# Patient Record
Sex: FEMALE | Race: WHITE | Employment: OTHER | URBAN - METROPOLITAN AREA
[De-identification: names, ages, dates, MRNs, and addresses within clinical notes are randomized per-mention and may not be internally consistent; named-entity substitution may affect disease eponyms.]

---

## 2017-01-26 ENCOUNTER — ANTICOAGULATION THERAPY VISIT (OUTPATIENT)
Dept: NURSING | Facility: CLINIC | Age: 72
End: 2017-01-26
Payer: COMMERCIAL

## 2017-01-26 DIAGNOSIS — I63.50 CEREBRAL ARTERY OCCLUSION WITH CEREBRAL INFARCTION (H): Primary | ICD-10-CM

## 2017-01-26 DIAGNOSIS — Z86.73 HISTORY OF CVA (CEREBROVASCULAR ACCIDENT): ICD-10-CM

## 2017-01-26 DIAGNOSIS — Z79.01 LONG-TERM (CURRENT) USE OF ANTICOAGULANTS: ICD-10-CM

## 2017-01-26 LAB — INR POINT OF CARE: 2 (ref 0.86–1.14)

## 2017-01-26 PROCEDURE — 99207 ZZC NO CHARGE NURSE ONLY: CPT

## 2017-01-26 PROCEDURE — 85610 PROTHROMBIN TIME: CPT | Mod: QW

## 2017-01-26 PROCEDURE — 36416 COLLJ CAPILLARY BLOOD SPEC: CPT

## 2017-01-26 NOTE — PROGRESS NOTES
ANTICOAGULATION FOLLOW-UP CLINIC VISIT    Patient Name:  Elise Mason  Date:  1/26/2017  Contact Type:  Face to Face    SUBJECTIVE:     Patient Findings     Positives No Problem Findings           OBJECTIVE    INR PROTIME   Date Value Ref Range Status   01/26/2017 2.0* 0.86 - 1.14 Final       ASSESSMENT / PLAN  INR assessment THER    Recheck INR In: 6 WEEKS    INR Location Clinic      Anticoagulation Summary as of 1/26/2017     INR goal 2.0-3.0   Selected INR 2.0 (1/26/2017)   Maintenance plan 5 mg (2.5 mg x 2) on Mon, Fri; 2.5 mg (2.5 mg x 1) all other days   Full instructions 5 mg on Mon, Fri; 2.5 mg all other days   Weekly total 22.5 mg   No change documented Fallon Velazco RN   Plan last modified Fallon Velazco RN (3/17/2016)   Next INR check 3/9/2017   Target end date Indefinite    Indications   Long-term (current) use of anticoagulants [Z79.01]  Cerebral artery occlusion with cerebral infarction (H) [I63.50]  History of CVA (cerebrovascular accident) [Z86.73]         Anticoagulation Episode Summary     [facility]    Preferred lab     Send INR reminders to [facility]    Comments 2.5mg tabs - dyllan dose // CALENDAR       Anticoagulation Care Providers     Provider Role Specialty Phone number    Cyn Crow MD  Internal Medicine [phone]            See the Encounter Report to view Anticoagulation Flowsheet and Dosing Calendar (Go to Encounters tab in chart review, and find the Anticoagulation Therapy Visit)        Fallon Velazco RN

## 2017-01-26 NOTE — MR AVS SNAPSHOT
Elise Mason   1/26/2017 9:00 AM   Anticoagulation Therapy Visit    Description:  71 year old female   Provider:   [facility]INIC   Department:   Nurse           INR as of 1/26/2017     Selected INR 2.0 (1/26/2017)      Anticoagulation Summary as of 1/26/2017     INR goal 2.0-3.0   Selected INR 2.0 (1/26/2017)   Full instructions 5 mg on Mon, Fri; 2.5 mg all other days   Next INR check 3/9/2017    Indications   Long-term (current) use of anticoagulants [Z79.01]  Cerebral artery occlusion with cerebral infarction (H) [I63.50]  History of CVA (cerebrovascular accident) [Z86.73]         Your next Anticoagulation Clinic appointment(s)     Mar 09, 2017  9:00 AM   Anticoagulation Visit with  ANTICOAGULATION CLINIC   [facility] ([facility])    [address] [place] [address]  [address]  [facility] [address] [phone]              Contact Numbers     [facility]  Please call  [phone] to cancel and/or reschedule your appointment   Please call  [phone] with any problems or questions regarding your therapy.        January 2017 Details    Sun Mon Tue Wed Thu Fri Sat     1               2               3               4               5               6               7                 8               9               10               11               12               13               14                 15               16               17               18               19               20               21                 22               23               24               25               26      2.5 mg   See details      27      5 mg         28      2.5 mg           29      2.5 mg         30      5 mg         31      2.5 mg              Date Details   01/26 This INR check               How to take your warfarin dose     To take:  2.5 mg Take 1 of the 2.5 mg tablets.    To take:  5 mg Take 2 of the 2.5 mg tablets.           February 2017 Details    Sun Mon Tue Wed Thu  Fri Sat        1      2.5 mg         2      2.5 mg         3      5 mg         4      2.5 mg           5      2.5 mg         6      5 mg         7      2.5 mg         8      2.5 mg         9      2.5 mg         10      5 mg         11      2.5 mg           12      2.5 mg         13      5 mg         14      2.5 mg         15      2.5 mg         16      2.5 mg         17      5 mg         18      2.5 mg           19      2.5 mg         20      5 mg         21      2.5 mg         22      2.5 mg         23      2.5 mg         24      5 mg         25      2.5 mg           26      2.5 mg         27      5 mg         28      2.5 mg              Date Details   No additional details            How to take your warfarin dose     To take:  2.5 mg Take 1 of the 2.5 mg tablets.    To take:  5 mg Take 2 of the 2.5 mg tablets.           March 2017 Details    Sun Mon Tue Wed Thu Fri Sat        1      2.5 mg         2      2.5 mg         3      5 mg         4      2.5 mg           5      2.5 mg         6      5 mg         7      2.5 mg         8      2.5 mg         9            10               11                 12               13               14               15               16               17               18                 19               20               21               22               23               24               25                 26               27               28               29               30               31                 Date Details   No additional details    Date of next INR:  3/9/2017         How to take your warfarin dose     To take:  2.5 mg Take 1 of the 2.5 mg tablets.    To take:  5 mg Take 2 of the 2.5 mg tablets.

## 2017-03-09 ENCOUNTER — ANTICOAGULATION THERAPY VISIT (OUTPATIENT)
Dept: NURSING | Facility: CLINIC | Age: 72
End: 2017-03-09
Payer: COMMERCIAL

## 2017-03-09 DIAGNOSIS — I63.50 CEREBRAL ARTERY OCCLUSION WITH CEREBRAL INFARCTION (H): ICD-10-CM

## 2017-03-09 DIAGNOSIS — Z79.01 LONG-TERM (CURRENT) USE OF ANTICOAGULANTS: ICD-10-CM

## 2017-03-09 DIAGNOSIS — Z86.73 HISTORY OF CVA (CEREBROVASCULAR ACCIDENT): ICD-10-CM

## 2017-03-09 LAB — INR POINT OF CARE: 1.8 (ref 0.86–1.14)

## 2017-03-09 PROCEDURE — 99207 ZZC NO CHARGE NURSE ONLY: CPT

## 2017-03-09 PROCEDURE — 36416 COLLJ CAPILLARY BLOOD SPEC: CPT

## 2017-03-09 PROCEDURE — 85610 PROTHROMBIN TIME: CPT | Mod: QW

## 2017-03-09 NOTE — PROGRESS NOTES
ANTICOAGULATION FOLLOW-UP CLINIC VISIT    Patient Name:  Elise Mason  Date:  3/9/2017  Contact Type:  Face to Face    SUBJECTIVE:     Patient Findings     Positives Change in diet/appetite    Comments Ate a large apple, kale, spinach salad last night.    She has noticed that she gets more short of breath when walking up stairs.  She was not short of breath at this visit.  Has a physical in July. Recommended she come in sooner if it gets worse.           OBJECTIVE    INR Protime   Date Value Ref Range Status   03/09/2017 1.8 (A) 0.86 - 1.14 Final       ASSESSMENT / PLAN  INR assessment SUB    Recheck INR In: 3 WEEKS    INR Location Clinic      Anticoagulation Summary as of 3/9/2017     INR goal 2.0-3.0   Today's INR 1.8!   Maintenance plan 5 mg (2.5 mg x 2) on Mon, Wed, Fri; 2.5 mg (2.5 mg x 1) all other days   Full instructions 3/9: 5 mg; Otherwise 5 mg on Mon, Wed, Fri; 2.5 mg all other days   Weekly total 25 mg   Plan last modified Fallon Velazco RN (3/9/2017)   Next INR check 3/30/2017   Target end date Indefinite    Indications   Long-term (current) use of anticoagulants [Z79.01]  Cerebral artery occlusion with cerebral infarction (H) [I63.50]  History of CVA (cerebrovascular accident) [Z86.73]         Anticoagulation Episode Summary     [facility]    Preferred lab     Send INR reminders to [facility]    Comments 2.5mg tabs - dyllan dose // CALENDAR       Anticoagulation Care Providers     Provider Role Specialty Phone number    Cyn Crow MD  Internal Medicine [phone]            See the Encounter Report to view Anticoagulation Flowsheet and Dosing Calendar (Go to Encounters tab in chart review, and find the Anticoagulation Therapy Visit)        Fallon Velazco RN

## 2017-03-09 NOTE — MR AVS SNAPSHOT
Elise Mason   3/9/2017 9:00 AM   Anticoagulation Therapy Visit    Description:  71 year old female   Provider:  [facility]   Department:   Nurse           INR as of 3/9/2017     Today's INR 1.8!      Anticoagulation Summary as of 3/9/2017     INR goal 2.0-3.0   Today's INR 1.8!   Full instructions 3/9: 5 mg; Otherwise 5 mg on Mon, Wed, Fri; 2.5 mg all other days   Next INR check 3/30/2017    Indications   Long-term (current) use of anticoagulants [Z79.01]  Cerebral artery occlusion with cerebral infarction (H) [I63.50]  History of CVA (cerebrovascular accident) [Z86.73]         Your next Anticoagulation Clinic appointment(s)     Mar 30, 2017  9:00 AM CDT   Anticoagulation Visit with  ANTICOAGULATION CLINIC   [facility] ([facility])    [address] [place] [address]  [address]  [facility] [address] [phone]              Contact Numbers     [facility]  Please call  [phone] to cancel and/or reschedule your appointment   Please call  [phone] with any problems or questions regarding your therapy.        March 2017 Details    Sun Mon Tue Wed Thu Fri Sat        1               2               3               4                 5               6               7               8               9      5 mg   See details      10      5 mg         11      2.5 mg           12      2.5 mg         13      5 mg         14      2.5 mg         15      5 mg         16      2.5 mg         17      5 mg         18      2.5 mg           19      2.5 mg         20      5 mg         21      2.5 mg         22      5 mg         23      2.5 mg         24      5 mg         25      2.5 mg           26      2.5 mg         27      5 mg         28      2.5 mg         29      5 mg         30            31                 Date Details   03/09 This INR check       Date of next INR:  3/30/2017         How to take your warfarin dose     To take:  2.5 mg Take 1 of the 2.5 mg tablets.     To take:  5 mg Take 2 of the 2.5 mg tablets.

## 2017-03-13 ENCOUNTER — TELEPHONE (OUTPATIENT)
Dept: PEDIATRICS | Facility: CLINIC | Age: 72
End: 2017-03-13

## 2017-03-13 DIAGNOSIS — E78.00 PURE HYPERCHOLESTEROLEMIA: ICD-10-CM

## 2017-03-13 DIAGNOSIS — Z94.0 KIDNEY REPLACED BY TRANSPLANT: Primary | ICD-10-CM

## 2017-03-13 NOTE — TELEPHONE ENCOUNTER
Patient called and scheduled a lab appointment for this Thursday 3/16. She said that it is for cholesterol and for her transplant labs that she has done every 3 to 4 months. Please enter recommended lab orders for this appointment.

## 2017-03-16 DIAGNOSIS — Z94.0 KIDNEY REPLACED BY TRANSPLANT: ICD-10-CM

## 2017-03-16 DIAGNOSIS — E78.00 PURE HYPERCHOLESTEROLEMIA: ICD-10-CM

## 2017-03-16 LAB
ERYTHROCYTE [DISTWIDTH] IN BLOOD BY AUTOMATED COUNT: 14.9 % (ref 10–15)
HCT VFR BLD AUTO: 44.6 % (ref 35–47)
HGB BLD-MCNC: 14.6 G/DL (ref 11.7–15.7)
MCH RBC QN AUTO: 30.9 PG (ref 26.5–33)
MCHC RBC AUTO-ENTMCNC: 32.7 G/DL (ref 31.5–36.5)
MCV RBC AUTO: 95 FL (ref 78–100)
PLATELET # BLD AUTO: 154 10E9/L (ref 150–450)
RBC # BLD AUTO: 4.72 10E12/L (ref 3.8–5.2)
WBC # BLD AUTO: 8.1 10E9/L (ref 4–11)

## 2017-03-16 PROCEDURE — 85027 COMPLETE CBC AUTOMATED: CPT | Performed by: PEDIATRICS

## 2017-03-16 PROCEDURE — 84100 ASSAY OF PHOSPHORUS: CPT | Performed by: PEDIATRICS

## 2017-03-16 PROCEDURE — 83735 ASSAY OF MAGNESIUM: CPT | Performed by: PEDIATRICS

## 2017-03-16 PROCEDURE — 36415 COLL VENOUS BLD VENIPUNCTURE: CPT | Performed by: PEDIATRICS

## 2017-03-16 PROCEDURE — 80053 COMPREHEN METABOLIC PANEL: CPT | Performed by: PEDIATRICS

## 2017-03-16 PROCEDURE — 80061 LIPID PANEL: CPT | Performed by: PEDIATRICS

## 2017-03-17 LAB
ALBUMIN SERPL-MCNC: 3.8 G/DL (ref 3.4–5)
ALP SERPL-CCNC: 69 U/L (ref 40–150)
ALT SERPL W P-5'-P-CCNC: 25 U/L (ref 0–50)
ANION GAP SERPL CALCULATED.3IONS-SCNC: 11 MMOL/L (ref 3–14)
AST SERPL W P-5'-P-CCNC: 27 U/L (ref 0–45)
BILIRUB SERPL-MCNC: 0.8 MG/DL (ref 0.2–1.3)
BUN SERPL-MCNC: 31 MG/DL (ref 7–30)
CALCIUM SERPL-MCNC: 10 MG/DL (ref 8.5–10.1)
CHLORIDE SERPL-SCNC: 105 MMOL/L (ref 94–109)
CHOLEST SERPL-MCNC: 163 MG/DL
CO2 SERPL-SCNC: 26 MMOL/L (ref 20–32)
CREAT SERPL-MCNC: 1.95 MG/DL (ref 0.52–1.04)
GFR SERPL CREATININE-BSD FRML MDRD: 25 ML/MIN/1.7M2
GLUCOSE SERPL-MCNC: 119 MG/DL (ref 70–99)
HDLC SERPL-MCNC: 75 MG/DL
LDLC SERPL CALC-MCNC: 56 MG/DL
MAGNESIUM SERPL-MCNC: 2 MG/DL (ref 1.6–2.3)
NONHDLC SERPL-MCNC: 88 MG/DL
PHOSPHATE SERPL-MCNC: 2.6 MG/DL (ref 2.5–4.5)
POTASSIUM SERPL-SCNC: 3.7 MMOL/L (ref 3.4–5.3)
PROT SERPL-MCNC: 7.5 G/DL (ref 6.8–8.8)
SODIUM SERPL-SCNC: 142 MMOL/L (ref 133–144)
TRIGL SERPL-MCNC: 162 MG/DL

## 2017-03-30 ENCOUNTER — ANTICOAGULATION THERAPY VISIT (OUTPATIENT)
Dept: NURSING | Facility: CLINIC | Age: 72
End: 2017-03-30
Payer: COMMERCIAL

## 2017-03-30 DIAGNOSIS — I63.50 CEREBRAL ARTERY OCCLUSION WITH CEREBRAL INFARCTION (H): ICD-10-CM

## 2017-03-30 DIAGNOSIS — Z79.01 LONG-TERM (CURRENT) USE OF ANTICOAGULANTS: ICD-10-CM

## 2017-03-30 DIAGNOSIS — Z86.73 HISTORY OF CVA (CEREBROVASCULAR ACCIDENT): ICD-10-CM

## 2017-03-30 LAB — INR POINT OF CARE: 2.3 (ref 0.86–1.14)

## 2017-03-30 PROCEDURE — 99207 ZZC NO CHARGE NURSE ONLY: CPT

## 2017-03-30 PROCEDURE — 36416 COLLJ CAPILLARY BLOOD SPEC: CPT

## 2017-03-30 PROCEDURE — 85610 PROTHROMBIN TIME: CPT | Mod: QW

## 2017-03-30 NOTE — MR AVS SNAPSHOT
Elise Mason   3/30/2017 9:00 AM   Anticoagulation Therapy Visit    Description:  71 year old female   Provider:  [facility]   Department:   Nurse           INR as of 3/30/2017     Today's INR 2.3      Anticoagulation Summary as of 3/30/2017     INR goal 2.0-3.0   Today's INR 2.3   Full instructions 5 mg on Mon, Wed, Fri; 2.5 mg all other days   Next INR check 5/11/2017    Indications   Long-term (current) use of anticoagulants [Z79.01]  Cerebral artery occlusion with cerebral infarction (H) [I63.50]  History of CVA (cerebrovascular accident) [Z86.73]         Your next Anticoagulation Clinic appointment(s)     May 11, 2017  9:00 AM CDT   Anticoagulation Visit with  ANTICOAGULATION CLINIC   [facility] ([facility])    [address] [place] [address]  [address]  [facility] [address] [phone]              Contact Numbers     [facility]  Please call  [phone] to cancel and/or reschedule your appointment   Please call  [phone] with any problems or questions regarding your therapy.        March 2017 Details    Sun Mon Tue Wed Thu Fri Sat        1               2               3               4                 5               6               7               8               9               10               11                 12               13               14               15               16               17               18                 19               20               21               22               23               24               25                 26               27               28               29               30      2.5 mg   See details      31      5 mg           Date Details   03/30 This INR check               How to take your warfarin dose     To take:  2.5 mg Take 1 of the 2.5 mg tablets.    To take:  5 mg Take 2 of the 2.5 mg tablets.           April 2017 Details    Sun Mon Tue Wed Thu Fri Sat           1      2.5 mg           2       2.5 mg         3      5 mg         4      2.5 mg         5      5 mg         6      2.5 mg         7      5 mg         8      2.5 mg           9      2.5 mg         10      5 mg         11      2.5 mg         12      5 mg         13      2.5 mg         14      5 mg         15      2.5 mg           16      2.5 mg         17      5 mg         18      2.5 mg         19      5 mg         20      2.5 mg         21      5 mg         22      2.5 mg           23      2.5 mg         24      5 mg         25      2.5 mg         26      5 mg         27      2.5 mg         28      5 mg         29      2.5 mg           30      2.5 mg                Date Details   No additional details            How to take your warfarin dose     To take:  2.5 mg Take 1 of the 2.5 mg tablets.    To take:  5 mg Take 2 of the 2.5 mg tablets.           May 2017 Details    Sun Mon Tue Wed Thu Fri Sat      1      5 mg         2      2.5 mg         3      5 mg         4      2.5 mg         5      5 mg         6      2.5 mg           7      2.5 mg         8      5 mg         9      2.5 mg         10      5 mg         11            12               13                 14               15               16               17               18               19               20                 21               22               23               24               25               26               27                 28               29               30               31                   Date Details   No additional details    Date of next INR:  5/11/2017         How to take your warfarin dose     To take:  2.5 mg Take 1 of the 2.5 mg tablets.    To take:  5 mg Take 2 of the 2.5 mg tablets.

## 2017-03-30 NOTE — PROGRESS NOTES
ANTICOAGULATION FOLLOW-UP CLINIC VISIT    Patient Name:  Elise Mason  Date:  3/30/2017  Contact Type:  Face to Face    SUBJECTIVE:     Patient Findings     Positives No Problem Findings           OBJECTIVE    INR Protime   Date Value Ref Range Status   03/30/2017 2.3 (A) 0.86 - 1.14 Final       ASSESSMENT / PLAN  INR assessment THER    Recheck INR In: 6 WEEKS    INR Location Clinic      Anticoagulation Summary as of 3/30/2017     INR goal 2.0-3.0   Today's INR 2.3   Maintenance plan 5 mg (2.5 mg x 2) on Mon, Wed, Fri; 2.5 mg (2.5 mg x 1) all other days   Full instructions 5 mg on Mon, Wed, Fri; 2.5 mg all other days   Weekly total 25 mg   No change documented Fallon Velazco RN   Plan last modified Fallon Velazco RN (3/9/2017)   Next INR check 5/11/2017   Target end date Indefinite    Indications   Long-term (current) use of anticoagulants [Z79.01]  Cerebral artery occlusion with cerebral infarction (H) [I63.50]  History of CVA (cerebrovascular accident) [Z86.73]         Anticoagulation Episode Summary     [facility]    Preferred lab     Send INR reminders to [facility]    Comments 2.5mg tabs - dyllan dose // CALENDAR       Anticoagulation Care Providers     Provider Role Specialty Phone number    Cyn Crow MD  Internal Medicine [phone]            See the Encounter Report to view Anticoagulation Flowsheet and Dosing Calendar (Go to Encounters tab in chart review, and find the Anticoagulation Therapy Visit)        Fallon Velazco RN

## 2017-05-08 ENCOUNTER — TELEPHONE (OUTPATIENT)
Dept: PEDIATRICS | Facility: CLINIC | Age: 72
End: 2017-05-08

## 2017-05-08 NOTE — TELEPHONE ENCOUNTER
Vince with Dr. Crow - would recommend she be seen sooner at  or office visit tomorrow. Wouldn't wait until 5/11/17.     Will follow up with the patient.

## 2017-05-08 NOTE — TELEPHONE ENCOUNTER
Reports cloudy urine, frequency, urgency, voiding in small amounts, suprapubic tightness x 1 week. Has pushed fluids and hasn't went away. Takes Bactrim DS qd for kidney transplant.   Denies - fever, chills, back pain, nausea, vomiting.     Would like lab only UA done on 5/11/17.

## 2017-05-09 ENCOUNTER — OFFICE VISIT (OUTPATIENT)
Dept: PEDIATRICS | Facility: CLINIC | Age: 72
End: 2017-05-09
Payer: COMMERCIAL

## 2017-05-09 VITALS
DIASTOLIC BLOOD PRESSURE: 64 MMHG | SYSTOLIC BLOOD PRESSURE: 112 MMHG | WEIGHT: 260.1 LBS | OXYGEN SATURATION: 93 % | TEMPERATURE: 98.5 F | BODY MASS INDEX: 43.34 KG/M2 | HEIGHT: 65 IN | HEART RATE: 100 BPM

## 2017-05-09 DIAGNOSIS — N30.00 ACUTE CYSTITIS WITHOUT HEMATURIA: ICD-10-CM

## 2017-05-09 DIAGNOSIS — R82.90 NONSPECIFIC FINDING ON EXAMINATION OF URINE: ICD-10-CM

## 2017-05-09 DIAGNOSIS — R30.0 DYSURIA: Primary | ICD-10-CM

## 2017-05-09 LAB
ALBUMIN UR-MCNC: 30 MG/DL
APPEARANCE UR: ABNORMAL
BACTERIA #/AREA URNS HPF: ABNORMAL /HPF
BILIRUB UR QL STRIP: NEGATIVE
COLOR UR AUTO: YELLOW
GLUCOSE UR STRIP-MCNC: NEGATIVE MG/DL
HGB UR QL STRIP: ABNORMAL
HYALINE CASTS #/AREA URNS LPF: ABNORMAL /LPF (ref 0–2)
KETONES UR STRIP-MCNC: NEGATIVE MG/DL
LEUKOCYTE ESTERASE UR QL STRIP: ABNORMAL
NITRATE UR QL: NEGATIVE
NON-SQ EPI CELLS #/AREA URNS LPF: ABNORMAL /LPF
PH UR STRIP: 5.5 PH (ref 5–7)
RBC #/AREA URNS AUTO: ABNORMAL /HPF (ref 0–2)
SP GR UR STRIP: 1.01 (ref 1–1.03)
URN SPEC COLLECT METH UR: ABNORMAL
UROBILINOGEN UR STRIP-ACNC: 0.2 EU/DL (ref 0.2–1)
WBC #/AREA URNS AUTO: ABNORMAL /HPF (ref 0–2)

## 2017-05-09 PROCEDURE — 87086 URINE CULTURE/COLONY COUNT: CPT | Performed by: INTERNAL MEDICINE

## 2017-05-09 PROCEDURE — 99213 OFFICE O/P EST LOW 20 MIN: CPT | Performed by: INTERNAL MEDICINE

## 2017-05-09 PROCEDURE — 87186 SC STD MICRODIL/AGAR DIL: CPT | Performed by: INTERNAL MEDICINE

## 2017-05-09 PROCEDURE — 81001 URINALYSIS AUTO W/SCOPE: CPT | Performed by: INTERNAL MEDICINE

## 2017-05-09 PROCEDURE — 87088 URINE BACTERIA CULTURE: CPT | Performed by: INTERNAL MEDICINE

## 2017-05-09 NOTE — PROGRESS NOTES
"  SUBJECTIVE:                                                    Elise Mason is a 71 year old female who presents to clinic today for the following health issues:      URINARY TRACT SYMPTOMS      Duration: 1 week ago.     Description  frequency and back pain    Intensity:  mild    Accompanying signs and symptoms:  Fever/chills: no   Flank pain YES- right side one night, not currently.   Nausea and vomiting: no   Vaginal symptoms: none  Abdominal/Pelvic Pain: no     History  History of frequent UTI's: no   History of kidney stones: no   Sexually Active: no   Possibility of pregnancy: No    Precipitating or alleviating factors: None    Therapies tried and outcome: cranberry juice and increase fluid intake   Outcome: No change.    Taking Bactrim daily for UTI prophylaxis.  Followed by urology as well.  Reviewed prior evaluation and treatment.     Problem list and histories reviewed & adjusted, as indicated.    Labs reviewed in EPIC      OBJECTIVE:                                                    /64 (Cuff Size: Adult Large)  Pulse 100  Temp 98.5  F (36.9  C) (Oral)  Ht 5' 5\" (1.651 m)  Wt 260 lb 1.6 oz (118 kg)  SpO2 93%  BMI 43.28 kg/m2  Body mass index is 43.28 kg/(m^2).  GEN: no distress  SKIN: no rashes  LUNGS: Clear to auscultation bilaterally. No rhonchi, rales, wheezes or retractions.  CV: Regular rate and rhythm.  No murmurs, rubs or gallops. Pulses 2+ radial.  ABD: Bowel sounds positive throughout. Soft, nontender, nondistended. No organomegaly. No masses. No CVA tenderness    UA:  Small blood, 30 protein, moderate LE  0-2 RBC  5-10 WBC     ASSESSMENT/PLAN:                                                        ICD-10-CM    1. Dysuria R30.0 *UA reflex to Microscopic and Culture ([place] (except [place] and [place])     Urine Microscopic   2. Nonspecific finding on examination of urine R82.90 Urine Culture Aerobic Bacterial   3. Acute cystitis without hematuria N30.00  "     With chronic antibiotic suppressive therapy, will await UCx result prior to changing atbx    Following OV, UCx:  >100k e Coli  R ampicillin, cipro, levofloxacin, sulfa  S Augmentin, nitrofurantoin, cephalosporins    Last   Lab Results   Component Value Date    CR 1.95 03/16/2017     Will not rx nitrofurantoin d/t renal insufficiency.  Rx Augmentin.     Has INR appointment 5/11/17.      Calos Adrian MD  [facility]

## 2017-05-09 NOTE — NURSING NOTE
"Chief Complaint   Patient presents with     Urinary Problem       Initial /64 (Cuff Size: Adult Large)  Pulse 100  Temp 98.5  F (36.9  C) (Oral)  Ht 5' 5\" (1.651 m)  Wt 260 lb 1.6 oz (118 kg)  SpO2 93%  BMI 43.28 kg/m2 Estimated body mass index is 43.28 kg/(m^2) as calculated from the following:    Height as of this encounter: 5' 5\" (1.651 m).    Weight as of this encounter: 260 lb 1.6 oz (118 kg).  Medication Reconciliation: complete    Coby Mcduffie   "

## 2017-05-11 ENCOUNTER — ANTICOAGULATION THERAPY VISIT (OUTPATIENT)
Dept: NURSING | Facility: CLINIC | Age: 72
End: 2017-05-11
Payer: COMMERCIAL

## 2017-05-11 DIAGNOSIS — Z86.73 HISTORY OF CVA (CEREBROVASCULAR ACCIDENT): ICD-10-CM

## 2017-05-11 DIAGNOSIS — Z79.01 LONG-TERM (CURRENT) USE OF ANTICOAGULANTS: ICD-10-CM

## 2017-05-11 DIAGNOSIS — I63.50 CEREBRAL ARTERY OCCLUSION WITH CEREBRAL INFARCTION (H): ICD-10-CM

## 2017-05-11 LAB
BACTERIA SPEC CULT: ABNORMAL
INR POINT OF CARE: 3.2 (ref 0.86–1.14)
MICRO REPORT STATUS: ABNORMAL
MICROORGANISM SPEC CULT: ABNORMAL
SPECIMEN SOURCE: ABNORMAL

## 2017-05-11 PROCEDURE — 85610 PROTHROMBIN TIME: CPT | Mod: QW

## 2017-05-11 PROCEDURE — 99207 ZZC NO CHARGE NURSE ONLY: CPT

## 2017-05-11 PROCEDURE — 36416 COLLJ CAPILLARY BLOOD SPEC: CPT

## 2017-05-11 NOTE — PROGRESS NOTES
ANTICOAGULATION FOLLOW-UP CLINIC VISIT    Patient Name:  Elise Mason  Date:  5/11/2017  Contact Type:  Face to Face  Starting Augmentin 875 mg bid x 1 week, will hold Bactrim while on augmentin.  SUBJECTIVE:        OBJECTIVE    INR Protime   Date Value Ref Range Status   03/30/2017 2.3 (A) 0.86 - 1.14 Final       ASSESSMENT / PLAN  INR assessment SUPRA    Recheck INR In: 1 WEEK    INR Location Clinic      Anticoagulation Summary as of 5/11/2017     INR goal 2.0-3.0   Today's INR    Plan last modified Fallon Velazco RN (3/9/2017)   Next INR check    Target end date Indefinite    Indications   Long-term (current) use of anticoagulants [Z79.01]  Cerebral artery occlusion with cerebral infarction (H) [I63.50]  History of CVA (cerebrovascular accident) [Z86.73]         Anticoagulation Episode Summary     [facility]    Preferred lab     Send INR reminders to [facility]    Comments 2.5mg tabs - dyllan dose // CALENDAR       Anticoagulation Care Providers     Provider Role Specialty Phone number    Cyn Crow MD  Internal Medicine [phone]            See the Encounter Report to view Anticoagulation Flowsheet and Dosing Calendar (Go to Encounters tab in chart review, and find the Anticoagulation Therapy Visit)      Blanca Holbrook RN

## 2017-05-11 NOTE — MR AVS SNAPSHOT
Elise Mason   5/11/2017 9:00 AM   Anticoagulation Therapy Visit    Description:  71 year old female   Provider:  [facility]   Department:   Nurse           INR as of 5/11/2017     Today's INR       Anticoagulation Summary as of 5/11/2017     INR goal 2.0-3.0   Today's INR    Next INR check     Indications   Long-term (current) use of anticoagulants [Z79.01]  Cerebral artery occlusion with cerebral infarction (H) [I63.50]  History of CVA (cerebrovascular accident) [Z86.73]         Your next Anticoagulation Clinic appointment(s)     May 18, 2017  8:45 AM CDT   Anticoagulation Visit with  ANTICOAGULATION CLINIC   [facility]    [address]  [address]  [facility] [address] [phone]              Contact Numbers     [facility]  Please call  [phone] to cancel and/or reschedule your appointment   Please call  [phone] with any problems or questions regarding your therapy.        March 2017 Details    Sun Mon Tue Wed Thu Fri Sat        1               2               3               4                 5               6               7               8               9               10               11                 12               13               14               15               16               17               18                 19               20               21               22               23               24               25                 26               27               28               29               30      2.5 mg   See details      31      5 mg           Date Details   03/30 This INR check               How to take your warfarin dose     To take:  2.5 mg Take 1 of the 2.5 mg tablets.    To take:  5 mg Take 2 of the 2.5 mg tablets.           April 2017 Details    Sun Mon Tue Wed Thu Fri Sat           1      2.5 mg           2      2.5 mg         3      5 mg         4      2.5 mg         5      5 mg          6      2.5 mg         7      5 mg         8      2.5 mg           9      2.5 mg         10      5 mg         11      2.5 mg         12      5 mg         13      2.5 mg         14      5 mg         15      2.5 mg           16      2.5 mg         17      5 mg         18      2.5 mg         19      5 mg         20      2.5 mg         21      5 mg         22      2.5 mg           23      2.5 mg         24      5 mg         25      2.5 mg         26      5 mg         27      2.5 mg         28      5 mg         29      2.5 mg           30      2.5 mg                Date Details   No additional details            How to take your warfarin dose     To take:  2.5 mg Take 1 of the 2.5 mg tablets.    To take:  5 mg Take 2 of the 2.5 mg tablets.           May 2017 Details    Sun Mon Tue Wed Thu Fri Sat      1      5 mg         2      2.5 mg         3      5 mg         4      2.5 mg         5      5 mg         6      2.5 mg           7      2.5 mg         8      5 mg         9      2.5 mg         10      5 mg         11            12               13                 14               15               16               17               18               19               20                 21               22               23               24               25               26               27                 28               29               30               31                   Date Details   No additional details    Date of next INR:  5/11/2017         How to take your warfarin dose     To take:  2.5 mg Take 1 of the 2.5 mg tablets.    To take:  5 mg Take 2 of the 2.5 mg tablets.

## 2017-05-18 ENCOUNTER — ANTICOAGULATION THERAPY VISIT (OUTPATIENT)
Dept: NURSING | Facility: CLINIC | Age: 72
End: 2017-05-18
Payer: COMMERCIAL

## 2017-05-18 DIAGNOSIS — I63.50 CEREBRAL ARTERY OCCLUSION WITH CEREBRAL INFARCTION (H): ICD-10-CM

## 2017-05-18 DIAGNOSIS — Z86.73 HISTORY OF CVA (CEREBROVASCULAR ACCIDENT): ICD-10-CM

## 2017-05-18 DIAGNOSIS — Z79.01 LONG-TERM (CURRENT) USE OF ANTICOAGULANTS: ICD-10-CM

## 2017-05-18 LAB — INR POINT OF CARE: 2.3 (ref 0.86–1.14)

## 2017-05-18 PROCEDURE — 36416 COLLJ CAPILLARY BLOOD SPEC: CPT

## 2017-05-18 PROCEDURE — 85610 PROTHROMBIN TIME: CPT | Mod: QW

## 2017-05-18 NOTE — MR AVS SNAPSHOT
Elise Mason   5/18/2017 8:45 AM   Anticoagulation Therapy Visit    Description:  71 year old female   Provider:  [facility]   Department:   Nurse           INR as of 5/18/2017     Today's INR 2.3      Anticoagulation Summary as of 5/18/2017     INR goal 2.0-3.0   Today's INR 2.3   Full instructions 5 mg on Mon, Wed, Fri; 2.5 mg all other days   Next INR check 6/2/2017    Indications   Long-term (current) use of anticoagulants [Z79.01]  Cerebral artery occlusion with cerebral infarction (H) [I63.50]  History of CVA (cerebrovascular accident) [Z86.73]         Instructions    Finished Augmentin yesterday, restarted Bactrim from this morning. UTI sx's has resolved.        Your next Anticoagulation Clinic appointment(s)     Jun 02, 2017  9:30 AM CDT   Anticoagulation Visit with  ANTICOAGULATION CLINIC   [facility] ([facility])    [address] [place] [address]  [address]  [facility] [address] [phone]              Contact Numbers     [facility]  Please call  [phone] to cancel and/or reschedule your appointment   Please call  [phone] with any problems or questions regarding your therapy.        May 2017 Details    Sun Mon Tue Wed Thu Fri Sat      1               2               3               4               5               6                 7               8               9               10               11               12               13                 14               15               16               17               18      2.5 mg   See details      19      5 mg         20      2.5 mg           21      2.5 mg         22      5 mg         23      2.5 mg         24      5 mg         25      2.5 mg         26      5 mg         27      2.5 mg           28      2.5 mg         29      5 mg         30      2.5 mg         31      5 mg             Date Details   05/18 This INR check               How to take your warfarin dose     To take:  2.5 mg  Take 1 of the 2.5 mg tablets.    To take:  5 mg Take 2 of the 2.5 mg tablets.           June 2017 Details    Sun Mon Tue Wed Thu Fri Sat         1      2.5 mg         2            3                 4               5               6               7               8               9               10                 11               12               13               14               15               16               17                 18               19               20               21               22               23               24                 25               26               27               28               29               30                 Date Details   No additional details    Date of next INR:  6/2/2017         How to take your warfarin dose     To take:  2.5 mg Take 1 of the 2.5 mg tablets.    To take:  5 mg Take 2 of the 2.5 mg tablets.

## 2017-05-18 NOTE — PROGRESS NOTES
ANTICOAGULATION FOLLOW-UP CLINIC VISIT    Patient Name:  Elise Mason  Date:  5/18/2017  Contact Type:  Face to Face    SUBJECTIVE:        OBJECTIVE    INR Protime   Date Value Ref Range Status   05/11/2017 3.2 (A) 0.86 - 1.14 Final       ASSESSMENT / PLAN  INR assessment THER    Recheck INR In: 2 WEEKS    INR Location Clinic      Anticoagulation Summary as of 5/18/2017     INR goal 2.0-3.0   Today's INR    Plan last modified Fallon Velazco RN (3/9/2017)   Next INR check    Target end date Indefinite    Indications   Long-term (current) use of anticoagulants [Z79.01]  Cerebral artery occlusion with cerebral infarction (H) [I63.50]  History of CVA (cerebrovascular accident) [Z86.73]         Anticoagulation Episode Summary     [facility]    Preferred lab     Send INR reminders to [facility]    Comments 2.5mg tabs - dyllan dose // CALENDAR       Anticoagulation Care Providers     Provider Role Specialty Phone number    Cyn Crow MD  Internal Medicine [phone]            See the Encounter Report to view Anticoagulation Flowsheet and Dosing Calendar (Go to Encounters tab in chart review, and find the Anticoagulation Therapy Visit)        Blanca Holbrook RN

## 2017-05-31 ENCOUNTER — TELEPHONE (OUTPATIENT)
Dept: PEDIATRICS | Facility: CLINIC | Age: 72
End: 2017-05-31

## 2017-05-31 DIAGNOSIS — R30.0 DYSURIA: Primary | ICD-10-CM

## 2017-06-02 ENCOUNTER — ANTICOAGULATION THERAPY VISIT (OUTPATIENT)
Dept: NURSING | Facility: CLINIC | Age: 72
End: 2017-06-02
Payer: COMMERCIAL

## 2017-06-02 DIAGNOSIS — R30.0 DYSURIA: ICD-10-CM

## 2017-06-02 DIAGNOSIS — N30.01 ACUTE CYSTITIS WITH HEMATURIA: ICD-10-CM

## 2017-06-02 DIAGNOSIS — I63.50 CEREBRAL ARTERY OCCLUSION WITH CEREBRAL INFARCTION (H): ICD-10-CM

## 2017-06-02 DIAGNOSIS — Z79.01 LONG-TERM (CURRENT) USE OF ANTICOAGULANTS: ICD-10-CM

## 2017-06-02 DIAGNOSIS — Z86.73 HISTORY OF CVA (CEREBROVASCULAR ACCIDENT): ICD-10-CM

## 2017-06-02 DIAGNOSIS — R82.90 NONSPECIFIC FINDING ON EXAMINATION OF URINE: Primary | ICD-10-CM

## 2017-06-02 LAB
ALBUMIN UR-MCNC: 30 MG/DL
APPEARANCE UR: CLEAR
BACTERIA #/AREA URNS HPF: ABNORMAL /HPF
BILIRUB UR QL STRIP: NEGATIVE
COLOR UR AUTO: YELLOW
GLUCOSE UR STRIP-MCNC: NEGATIVE MG/DL
HGB UR QL STRIP: ABNORMAL
INR POINT OF CARE: 3 (ref 0.86–1.14)
KETONES UR STRIP-MCNC: NEGATIVE MG/DL
LEUKOCYTE ESTERASE UR QL STRIP: ABNORMAL
NITRATE UR QL: POSITIVE
PH UR STRIP: 5.5 PH (ref 5–7)
RBC #/AREA URNS AUTO: ABNORMAL /HPF (ref 0–2)
SP GR UR STRIP: 1.01 (ref 1–1.03)
URN SPEC COLLECT METH UR: ABNORMAL
UROBILINOGEN UR STRIP-ACNC: 0.2 EU/DL (ref 0.2–1)
WBC #/AREA URNS AUTO: >100 /HPF (ref 0–2)

## 2017-06-02 PROCEDURE — 87086 URINE CULTURE/COLONY COUNT: CPT | Performed by: PEDIATRICS

## 2017-06-02 PROCEDURE — 36416 COLLJ CAPILLARY BLOOD SPEC: CPT

## 2017-06-02 PROCEDURE — 81001 URINALYSIS AUTO W/SCOPE: CPT | Performed by: PEDIATRICS

## 2017-06-02 PROCEDURE — 87186 SC STD MICRODIL/AGAR DIL: CPT | Performed by: PEDIATRICS

## 2017-06-02 PROCEDURE — 85610 PROTHROMBIN TIME: CPT | Mod: QW

## 2017-06-02 PROCEDURE — 99207 ZZC NO CHARGE NURSE ONLY: CPT

## 2017-06-02 PROCEDURE — 87088 URINE BACTERIA CULTURE: CPT | Performed by: PEDIATRICS

## 2017-06-02 RX ORDER — CEFDINIR 300 MG/1
300 CAPSULE ORAL 2 TIMES DAILY
Qty: 20 CAPSULE | Refills: 0 | Status: SHIPPED | OUTPATIENT
Start: 2017-06-02 | End: 2017-07-14

## 2017-06-02 NOTE — MR AVS SNAPSHOT
Elise Mason   6/2/2017 9:30 AM   Anticoagulation Therapy Visit    Description:  71 year old female   Provider:  [facility]   Department:  [facility]           INR as of 6/2/2017     Today's INR 3.0      Anticoagulation Summary as of 6/2/2017     INR goal 2.0-3.0   Today's INR 3.0   Full instructions 5 mg on Mon, Wed, Fri; 2.5 mg all other days   Next INR check 7/14/2017    Indications   Long-term (current) use of anticoagulants [Z79.01]  Cerebral artery occlusion with cerebral infarction (H) [I63.50]  History of CVA (cerebrovascular accident) [Z86.73]         Your next Anticoagulation Clinic appointment(s)     Jul 14, 2017  8:45 AM CDT   Anticoagulation Visit with  ANTICOAGULATION CLINIC   [facility] ([facility])    [address] [place] [address]  [address]  [facility] [address] [phone]              Contact Numbers     [facility]  Please call  [phone] to cancel and/or reschedule your appointment   Please call  [phone] with any problems or questions regarding your therapy.        June 2017 Details    Sun Mon Tue Wed Thu Fri Sat         1               2      5 mg   See details      3      2.5 mg           4      2.5 mg         5      5 mg         6      2.5 mg         7      5 mg         8      2.5 mg         9      5 mg         10      2.5 mg           11      2.5 mg         12      5 mg         13      2.5 mg         14      5 mg         15      2.5 mg         16      5 mg         17      2.5 mg           18      2.5 mg         19      5 mg         20      2.5 mg         21      5 mg         22      2.5 mg         23      5 mg         24      2.5 mg           25      2.5 mg         26      5 mg         27      2.5 mg         28      5 mg         29      2.5 mg         30      5 mg           Date Details   06/02 This INR check               How to take your warfarin dose     To take:  2.5 mg Take 1 of the 2.5 mg tablets.    To take:  5 mg Take 2 of  the 2.5 mg tablets.           July 2017 Details    Sun Mon Tue Wed Thu Fri Sat           1      2.5 mg           2      2.5 mg         3      5 mg         4      2.5 mg         5      5 mg         6      2.5 mg         7      5 mg         8      2.5 mg           9      2.5 mg         10      5 mg         11      2.5 mg         12      5 mg         13      2.5 mg         14            15                 16               17               18               19               20               21               22                 23               24               25               26               27               28               29                 30               31                     Date Details   No additional details    Date of next INR:  7/14/2017         How to take your warfarin dose     To take:  2.5 mg Take 1 of the 2.5 mg tablets.    To take:  5 mg Take 2 of the 2.5 mg tablets.

## 2017-06-02 NOTE — PROGRESS NOTES
ANTICOAGULATION FOLLOW-UP CLINIC VISIT    Patient Name:  Elise Mason  Date:  6/2/2017  Contact Type:  Face to Face    SUBJECTIVE:     Patient Findings     Positives Antibiotic use or infection    Comments She suspects she is having another bladder infection.  Dropped off U/A at lab this morning.            OBJECTIVE    INR Protime   Date Value Ref Range Status   06/02/2017 3.0 (A) 0.86 - 1.14 Final       ASSESSMENT / PLAN  INR assessment THER    Recheck INR In: 6 WEEKS    INR Location Clinic      Anticoagulation Summary as of 6/2/2017     INR goal 2.0-3.0   Today's INR 3.0   Maintenance plan 5 mg (2.5 mg x 2) on Mon, Wed, Fri; 2.5 mg (2.5 mg x 1) all other days   Full instructions 5 mg on Mon, Wed, Fri; 2.5 mg all other days   Weekly total 25 mg   No change documented Fallon Velazco RN   Plan last modified Fallon Velazco RN (3/9/2017)   Next INR check 7/14/2017   Target end date Indefinite    Indications   Long-term (current) use of anticoagulants [Z79.01]  Cerebral artery occlusion with cerebral infarction (H) [I63.50]  History of CVA (cerebrovascular accident) [Z86.73]         Anticoagulation Episode Summary     [facility]    Preferred lab     Send INR reminders to [facility]    Comments 2.5mg tabs - dyllan dose // CALENDAR       Anticoagulation Care Providers     Provider Role Specialty Phone number    Cyn Crow MD  Internal Medicine [phone]            See the Encounter Report to view Anticoagulation Flowsheet and Dosing Calendar (Go to Encounters tab in chart review, and find the Anticoagulation Therapy Visit)        Fallon Velazco RN

## 2017-06-05 LAB
BACTERIA SPEC CULT: ABNORMAL
MICRO REPORT STATUS: ABNORMAL
MICROORGANISM SPEC CULT: ABNORMAL
SPECIMEN SOURCE: ABNORMAL

## 2017-06-12 DIAGNOSIS — Z79.01 LONG TERM (CURRENT) USE OF ANTICOAGULANTS: ICD-10-CM

## 2017-06-12 RX ORDER — WARFARIN SODIUM 2.5 MG/1
TABLET ORAL
Qty: 125 TABLET | Refills: 1 | Status: SHIPPED | OUTPATIENT
Start: 2017-06-12 | End: 2017-12-11

## 2017-06-12 NOTE — TELEPHONE ENCOUNTER
Warfarin refill request from pharmacy.    Last pertinent lab:  INR 3.0 on 6/2/17    Refilled per nurse protocol standing orders.    MARTIN Rios RN

## 2017-06-20 DIAGNOSIS — Z94.0 KIDNEY REPLACED BY TRANSPLANT: ICD-10-CM

## 2017-06-20 RX ORDER — AMOXICILLIN 500 MG/1
CAPSULE ORAL
Qty: 4 CAPSULE | Refills: 3 | Status: SHIPPED | OUTPATIENT
Start: 2017-06-20 | End: 2018-01-03

## 2017-07-14 ENCOUNTER — ANTICOAGULATION THERAPY VISIT (OUTPATIENT)
Dept: NURSING | Facility: CLINIC | Age: 72
End: 2017-07-14
Payer: COMMERCIAL

## 2017-07-14 DIAGNOSIS — I63.50 CEREBRAL ARTERY OCCLUSION WITH CEREBRAL INFARCTION (H): ICD-10-CM

## 2017-07-14 DIAGNOSIS — Z86.73 HISTORY OF CVA (CEREBROVASCULAR ACCIDENT): ICD-10-CM

## 2017-07-14 DIAGNOSIS — Z79.01 LONG-TERM (CURRENT) USE OF ANTICOAGULANTS: ICD-10-CM

## 2017-07-14 LAB — INR POINT OF CARE: 2.4 (ref 0.86–1.14)

## 2017-07-14 PROCEDURE — 36416 COLLJ CAPILLARY BLOOD SPEC: CPT

## 2017-07-14 PROCEDURE — 99207 ZZC NO CHARGE NURSE ONLY: CPT

## 2017-07-14 PROCEDURE — 85610 PROTHROMBIN TIME: CPT | Mod: QW

## 2017-07-14 NOTE — MR AVS SNAPSHOT
Elise Mason   7/14/2017 8:45 AM   Anticoagulation Therapy Visit    Description:  71 year old female   Provider:  [facility]   Department:   Nurse           INR as of 7/14/2017     Today's INR 2.4      Anticoagulation Summary as of 7/14/2017     INR goal 2.0-3.0   Today's INR 2.4   Full instructions 5 mg on Mon, Wed, Fri; 2.5 mg all other days   Next INR check 8/25/2017    Indications   Long-term (current) use of anticoagulants [Z79.01]  Cerebral artery occlusion with cerebral infarction (H) [I63.50]  History of CVA (cerebrovascular accident) [Z86.73]         Your next Anticoagulation Clinic appointment(s)     Aug 25, 2017  8:45 AM CDT   Anticoagulation Visit with  ANTICOAGULATION CLINIC   [facility] ([facility])    [address] [place] [address]  [address]  [facility] [address] [phone]              Contact Numbers     [facility]  Please call  [phone] to cancel and/or reschedule your appointment   Please call  [phone] with any problems or questions regarding your therapy.        July 2017 Details    Sun Mon Tue Wed Thu Fri Sat           1                 2               3               4               5               6               7               8                 9               10               11               12               13               14      5 mg   See details      15      2.5 mg           16      2.5 mg         17      5 mg         18      2.5 mg         19      5 mg         20      2.5 mg         21      5 mg         22      2.5 mg           23      2.5 mg         24      5 mg         25      2.5 mg         26      5 mg         27      2.5 mg         28      5 mg         29      2.5 mg           30      2.5 mg         31      5 mg               Date Details   07/14 This INR check               How to take your warfarin dose     To take:  2.5 mg Take 1 of the 2.5 mg tablets.    To take:  5 mg Take 2 of the 2.5 mg tablets.            August 2017 Details    Sun Mon Tue Wed Thu Fri Sat       1      2.5 mg         2      5 mg         3      2.5 mg         4      5 mg         5      2.5 mg           6      2.5 mg         7      5 mg         8      2.5 mg         9      5 mg         10      2.5 mg         11      5 mg         12      2.5 mg           13      2.5 mg         14      5 mg         15      2.5 mg         16      5 mg         17      2.5 mg         18      5 mg         19      2.5 mg           20      2.5 mg         21      5 mg         22      2.5 mg         23      5 mg         24      2.5 mg         25            26                 27               28               29               30               31                  Date Details   No additional details    Date of next INR:  8/25/2017         How to take your warfarin dose     To take:  2.5 mg Take 1 of the 2.5 mg tablets.    To take:  5 mg Take 2 of the 2.5 mg tablets.

## 2017-07-14 NOTE — PROGRESS NOTES
ANTICOAGULATION FOLLOW-UP CLINIC VISIT    Patient Name:  Elise Mason  Date:  7/14/2017  Contact Type:  Face to Face    SUBJECTIVE:     Patient Findings     Positives No Problem Findings           OBJECTIVE    INR Protime   Date Value Ref Range Status   07/14/2017 2.4 (A) 0.86 - 1.14 Final       ASSESSMENT / PLAN  INR assessment THER    Recheck INR In: 6 WEEKS    INR Location Clinic      Anticoagulation Summary as of 7/14/2017     INR goal 2.0-3.0   Today's INR 2.4   Maintenance plan 5 mg (2.5 mg x 2) on Mon, Wed, Fri; 2.5 mg (2.5 mg x 1) all other days   Full instructions 5 mg on Mon, Wed, Fri; 2.5 mg all other days   Weekly total 25 mg   No change documented Fallon Velazco RN   Plan last modified Fallon Velazco RN (3/9/2017)   Next INR check 8/25/2017   Target end date Indefinite    Indications   Long-term (current) use of anticoagulants [Z79.01]  Cerebral artery occlusion with cerebral infarction (H) [I63.50]  History of CVA (cerebrovascular accident) [Z86.73]         Anticoagulation Episode Summary     [facility]    Preferred lab     Send INR reminders to [facility]    Comments 2.5mg tabs - dyllan dose // CALENDAR       Anticoagulation Care Providers     Provider Role Specialty Phone number    Cyn Crow MD  Internal Medicine [phone]            See the Encounter Report to view Anticoagulation Flowsheet and Dosing Calendar (Go to Encounters tab in chart review, and find the Anticoagulation Therapy Visit)        Fallon Velazco RN

## 2017-08-03 DIAGNOSIS — I10 BENIGN ESSENTIAL HYPERTENSION: ICD-10-CM

## 2017-08-03 DIAGNOSIS — Z94.0 KIDNEY REPLACED BY TRANSPLANT: ICD-10-CM

## 2017-08-03 NOTE — TELEPHONE ENCOUNTER
folic acid (FOLVITE) 1 MG tablet      Last Written Prescription Date: 8/1/2016  Last Fill Quantity: 180,  # refills: 3   Last Office Visit with [facility], [facility] or  Health prescribing provider: 5/9/2017                                         Next 5 appointments (look out 90 days)     Aug 10, 2017  8:40 AM CDT   PHYSICAL with Cyn Crow MD   [facility]    [address] [place] [address]  [address]  [facility] [address]   [phone]                    felodipine ER (PLENDIL) 2.5 MG 24 hr tablet      Last Written Prescription Date: 8/1/2016  Last Fill Quantity: 90, # refills: 3    Last Office Visit with [facility], [facility] or  Health prescribing provider:  5/9/2017   Future Office Visit:    Next 5 appointments (look out 90 days)     Aug 10, 2017  8:40 AM CDT   PHYSICAL with Cyn Crow MD   [facility]    [address] [facility]  [address]  [facility] [address]   [phone]                    BP Readings from Last 3 Encounters:   05/09/17 112/64   08/15/16 128/70   08/01/16 128/70

## 2017-08-07 RX ORDER — FELODIPINE 2.5 MG/1
TABLET, EXTENDED RELEASE ORAL
Qty: 90 TABLET | Refills: 1 | Status: SHIPPED | OUTPATIENT
Start: 2017-08-07 | End: 2017-12-01

## 2017-08-07 RX ORDER — FOLIC ACID 1 MG/1
TABLET ORAL
Qty: 180 TABLET | Refills: 1 | Status: SHIPPED | OUTPATIENT
Start: 2017-08-07 | End: 2017-12-01

## 2017-08-07 NOTE — TELEPHONE ENCOUNTER
Prescription approved per [facility] Refill Protocol.  Upcoming appointment for physical this week.  Madie Jackson, RN  Triage Nurse

## 2017-08-10 ENCOUNTER — OFFICE VISIT (OUTPATIENT)
Dept: PEDIATRICS | Facility: CLINIC | Age: 72
End: 2017-08-10
Payer: COMMERCIAL

## 2017-08-10 VITALS
BODY MASS INDEX: 42.42 KG/M2 | WEIGHT: 254.6 LBS | TEMPERATURE: 98.1 F | HEIGHT: 65 IN | HEART RATE: 73 BPM | DIASTOLIC BLOOD PRESSURE: 80 MMHG | OXYGEN SATURATION: 94 % | SYSTOLIC BLOOD PRESSURE: 126 MMHG

## 2017-08-10 DIAGNOSIS — M81.0 OSTEOPOROSIS, UNSPECIFIED OSTEOPOROSIS TYPE, UNSPECIFIED PATHOLOGICAL FRACTURE PRESENCE: ICD-10-CM

## 2017-08-10 DIAGNOSIS — N39.0 RECURRENT UTI: ICD-10-CM

## 2017-08-10 DIAGNOSIS — R35.0 URINARY FREQUENCY: ICD-10-CM

## 2017-08-10 DIAGNOSIS — Z12.31 VISIT FOR SCREENING MAMMOGRAM: ICD-10-CM

## 2017-08-10 DIAGNOSIS — Z00.00 ROUTINE HISTORY AND PHYSICAL EXAMINATION OF ADULT: Primary | ICD-10-CM

## 2017-08-10 DIAGNOSIS — Z86.73 HISTORY OF CVA (CEREBROVASCULAR ACCIDENT): ICD-10-CM

## 2017-08-10 DIAGNOSIS — I10 BENIGN ESSENTIAL HYPERTENSION: ICD-10-CM

## 2017-08-10 DIAGNOSIS — Z79.01 LONG-TERM (CURRENT) USE OF ANTICOAGULANTS: ICD-10-CM

## 2017-08-10 DIAGNOSIS — I63.50 CEREBRAL ARTERY OCCLUSION WITH CEREBRAL INFARCTION (H): ICD-10-CM

## 2017-08-10 DIAGNOSIS — R73.01 IMPAIRED FASTING GLUCOSE: ICD-10-CM

## 2017-08-10 DIAGNOSIS — M25.552 HIP PAIN, LEFT: ICD-10-CM

## 2017-08-10 DIAGNOSIS — Q61.3 POLYCYSTIC KIDNEY: ICD-10-CM

## 2017-08-10 DIAGNOSIS — D84.9 IMMUNOSUPPRESSED STATUS (H): ICD-10-CM

## 2017-08-10 DIAGNOSIS — Z94.0 KIDNEY REPLACED BY TRANSPLANT: ICD-10-CM

## 2017-08-10 DIAGNOSIS — E66.01 MORBID OBESITY, UNSPECIFIED OBESITY TYPE (H): ICD-10-CM

## 2017-08-10 DIAGNOSIS — I10 ESSENTIAL HYPERTENSION: ICD-10-CM

## 2017-08-10 DIAGNOSIS — R06.09 DYSPNEA ON EXERTION: ICD-10-CM

## 2017-08-10 DIAGNOSIS — E78.00 PURE HYPERCHOLESTEROLEMIA: ICD-10-CM

## 2017-08-10 DIAGNOSIS — Q44.6 POLYCYSTIC LIVER DISEASE: ICD-10-CM

## 2017-08-10 LAB
ALBUMIN UR-MCNC: NEGATIVE MG/DL
APPEARANCE UR: ABNORMAL
BACTERIA #/AREA URNS HPF: ABNORMAL /HPF
BILIRUB UR QL STRIP: NEGATIVE
COLOR UR AUTO: YELLOW
GLUCOSE UR STRIP-MCNC: NEGATIVE MG/DL
HGB UR QL STRIP: ABNORMAL
KETONES UR STRIP-MCNC: NEGATIVE MG/DL
LEUKOCYTE ESTERASE UR QL STRIP: ABNORMAL
NITRATE UR QL: NEGATIVE
NON-SQ EPI CELLS #/AREA URNS LPF: ABNORMAL /LPF
PH UR STRIP: 6 PH (ref 5–7)
RBC #/AREA URNS AUTO: ABNORMAL /HPF (ref 0–2)
SP GR UR STRIP: 1.01 (ref 1–1.03)
URN SPEC COLLECT METH UR: ABNORMAL
UROBILINOGEN UR STRIP-ACNC: 0.2 EU/DL (ref 0.2–1)
WBC #/AREA URNS AUTO: >100 /HPF (ref 0–2)

## 2017-08-10 PROCEDURE — 99213 OFFICE O/P EST LOW 20 MIN: CPT | Mod: 25 | Performed by: PEDIATRICS

## 2017-08-10 PROCEDURE — 81001 URINALYSIS AUTO W/SCOPE: CPT | Performed by: PEDIATRICS

## 2017-08-10 PROCEDURE — 87086 URINE CULTURE/COLONY COUNT: CPT | Performed by: PEDIATRICS

## 2017-08-10 PROCEDURE — 87186 SC STD MICRODIL/AGAR DIL: CPT | Performed by: PEDIATRICS

## 2017-08-10 PROCEDURE — 87088 URINE BACTERIA CULTURE: CPT | Performed by: PEDIATRICS

## 2017-08-10 PROCEDURE — 99397 PER PM REEVAL EST PAT 65+ YR: CPT | Performed by: PEDIATRICS

## 2017-08-10 RX ORDER — CALCITRIOL 0.5 UG/1
0.5 CAPSULE, LIQUID FILLED ORAL DAILY
Qty: 90 CAPSULE | Refills: 3 | Status: SHIPPED | OUTPATIENT
Start: 2017-08-10 | End: 2017-12-01

## 2017-08-10 RX ORDER — SIMVASTATIN 20 MG
20 TABLET ORAL AT BEDTIME
Qty: 90 TABLET | Refills: 3 | Status: SHIPPED | OUTPATIENT
Start: 2017-08-10 | End: 2017-12-01

## 2017-08-10 RX ORDER — FUROSEMIDE 80 MG
80 TABLET ORAL 2 TIMES DAILY
Qty: 180 TABLET | Refills: 3 | Status: SHIPPED | OUTPATIENT
Start: 2017-08-10 | End: 2017-12-01

## 2017-08-10 RX ORDER — DILTIAZEM HYDROCHLORIDE 240 MG/1
240 CAPSULE, EXTENDED RELEASE ORAL DAILY
Qty: 90 CAPSULE | Refills: 3 | Status: SHIPPED | OUTPATIENT
Start: 2017-08-10 | End: 2017-12-01

## 2017-08-10 RX ORDER — CEFDINIR 300 MG/1
300 CAPSULE ORAL 2 TIMES DAILY
Qty: 20 CAPSULE | Refills: 0 | Status: ON HOLD | OUTPATIENT
Start: 2017-08-10 | End: 2018-02-23

## 2017-08-10 RX ORDER — SULFAMETHOXAZOLE AND TRIMETHOPRIM 400; 80 MG/1; MG/1
1 TABLET ORAL DAILY
Qty: 90 TABLET | Refills: 3 | Status: SHIPPED | OUTPATIENT
Start: 2017-08-10 | End: 2017-12-01

## 2017-08-10 RX ORDER — CLONIDINE HYDROCHLORIDE 0.2 MG/1
0.2 TABLET ORAL 2 TIMES DAILY
Qty: 180 TABLET | Refills: 3 | Status: SHIPPED | OUTPATIENT
Start: 2017-08-10 | End: 2017-12-01

## 2017-08-10 NOTE — PROGRESS NOTES
SUBJECTIVE:   Elise Mason is a 71 year old female who presents for Preventive Visit.      Are you in the first 12 months of your Medicare coverage?  No    Physical   Annual:     Getting at least 3 servings of Calcium per day::  Yes    Bi-annual eye exam::  Yes    Dental care twice a year::  Yes    Sleep apnea or symptoms of sleep apnea::  None    Diet::  Regular (no restrictions)    Frequency of exercise::  None    Taking medications regularly::  Yes    Medication side effects::  None    Additional concerns today::  YES      COGNITIVE SCREEN  1) Repeat 3 items (Banana, Sunrise, Chair)    2) Clock draw: NORMAL  3) 3 item recall: Recalls 1 object   Results: NORMAL clock, 1-2 items recalled: COGNITIVE IMPAIRMENT LESS LIKELY    Mini-CogTM Copyright S [place]. Licensed by the author for use in [facility]; reprinted with permission (niall@[facility]). All rights reserved.          Reviewed and updated as needed this visit by clinical staffTobacco  Allergies  Med Hx  Surg Hx  Fam Hx  Soc Hx        Reviewed and updated as needed this visit by Provider        Social History   Substance Use Topics     Smoking status: Former Smoker     Quit date: 1/1/1990     Smokeless tobacco: Never Used     Alcohol use Yes      Comment: 2-3x per year       The patient does not drink >3 drinks per day nor >7 drinks per week.      Today's PHQ-2 Score: PHQ-2 ( 1999 [employer]) 8/10/2017   Q1: Little interest or pleasure in doing things 0   Q2: Feeling down, depressed or hopeless 0   PHQ-2 Score 0   Q1: Little interest or pleasure in doing things Not at all   Q2: Feeling down, depressed or hopeless Not at all   PHQ-2 Score 0       Do you feel safe in your environment - Yes    Do you have a Health Care Directive?: No: Advance care planning reviewed with patient; information given to patient to review.      Current providers sharing in care for this patient include: Patient Care Team:  Cyn Crow MD as PCP - General  (Internal Medicine)  Michel Ventura MD as MD (Nephrology)      Hearing impairment: No    Ability to successfully perform activities of daily living: Yes, no assistance needed     Fall risk:  Fallen 2 or more times in the past year?: No  Any fall with injury in the past year?: No      Home safety:  none identified      The following health maintenance items are reviewed in Epic and correct as of today:  Health Maintenance   Topic Date Due     SARAH QUESTIONNAIRE 1 YEAR  09/26/1963     PHQ-9 Q1YR  09/26/1963     ADVANCE DIRECTIVE PLANNING Q5 YRS  07/01/2016     MEDICARE ANNUAL WELLNESS VISIT  08/01/2017     OP ANNUAL INR REFERRAL  08/01/2017     FALL RISK ASSESSMENT  08/01/2017     MAMMO Q1 YR  08/09/2017     DEXA Q2 YR  08/13/2017     INFLUENZA VACCINE (SYSTEM ASSIGNED)  09/01/2017     BMP Q1 YR  03/16/2018     LIPID MONITORING Q1 YEAR  03/16/2018     COLONOSCOPY Q10 YR  07/02/2019     TETANUS Q10 YR  08/09/2022     PNEUMOCOCCAL  Completed     HEPATITIS C SCREENING  Completed     Feels like bladder infection is coming back again.  Seems like she can only last 2-3 weeks without antibiotics.  Symptoms improve with antibiotics.  Has been growing E coli.  Has has symptoms for about another 2 weeks - has had urinary urgency and dysuria.  Urine has been cloudy, no hematuria.      Transplanted kidney - stable immunosuppression.  Follows with Dr Isidro with labs every 3-4 months.    HTN - well controlled on current medications.  No cardiac pain.    Feels like she is more out of breath if tries to walk fast or goes up and down steps.  Gradually worsening over time.   Then gets upstairs from the basement, breathes harder.  Sometimes notices a wheeze.   Coughing the mornings, sometimes coughs up something, sometimes dry.  Cold air will make her cough.   Smoked for 30 years - about 1 pack per day.  Denies associated chest pain or pressure.     Hx of CVA - on warfarin, therapeutic.  No new neurologic  "symptoms.    Dermatology - following every 6 months for history of skin cancer.  No new acute skin concerns.    Polycystic liver and kidney disease - runs strongly in her family.  No pain in her abdomen - not interested in pursuing at this time unless she starts to develop pain from cysts.    Fosamax holiday for 1 year now - due for DEXA scan.  Since being off of fosamax, feels that left hip has been aching.  This is the replaced hip.  Happens every once in awhile - not even weekly pain at this point.    HL - tolerating simvastatin without difficulty.    LE edema - stable - wears compression stockings and using lasix.      Mammogram Screening: Patient over age 50, mutual decision to screen reflected in health maintenance.     ROS: 10 point ROS neg other than the symptoms noted above in the HPI.      OBJECTIVE:   /80 (BP Location: Right arm, Patient Position: Right side, Cuff Size: Adult Regular)  Pulse 73  Temp 98.1  F (36.7  C) (Tympanic)  Ht 5' 5\" (1.651 m)  Wt 254 lb 9.6 oz (115.5 kg)  SpO2 94%  BMI 42.37 kg/m2 Estimated body mass index is 42.37 kg/(m^2) as calculated from the following:    Height as of this encounter: 5' 5\" (1.651 m).    Weight as of this encounter: 254 lb 9.6 oz (115.5 kg).  EXAM:   GENERAL: alert, no distress, obese, very pleasant  EYES: Eyes grossly normal to inspection, PERRL and conjunctivae and sclerae normal  HENT: ear canals and TM's normal, nose and mouth without ulcers or lesions  NECK: no adenopathy, no asymmetry, masses, or scars and thyroid normal to palpation  RESP: prolonged expiratory phase, moving air easily, no rales or wheeze, no increased work of breathing  CV: regular rate and rhythm, normal S1 S2, no S3 or S4, no murmur, click or rub, and peripheral pulses strong  ABDOMEN: obese, +BS, nontender  MS: no gross musculoskeletal defects noted, difficulty navigating exam table - needs assistance of one, chronic 1+ edema to lower extremities, stable over time  SKIN: no " suspicious lesions or rashes  NEURO: Normal strength and tone, mentation intact and speech normal  PSYCH: mentation appears normal, affect normal/bright    ASSESSMENT / PLAN:       ICD-10-CM    1. Routine history and physical examination of adult Z00.00 Comprehensive metabolic panel     Lipid panel reflex to direct LDL     CBC with platelets     Phosphorus                       2. Morbid obesity, unspecified obesity type (H) E66.01 Weight stable from last year.    3. Immunosuppressed status (H) D89.9 Cyclosporine, followed by Dr Isidro - no current concerns with medication regimen        4. Cerebral artery occlusion with cerebral infarction (H) I63.50 INR CLINIC REFERRAL  No new events, continue coumadin, bp control, statin   5. Osteoporosis, unspecified osteoporosis type, unspecified pathological fracture presence M81.0 DEXA HIP/PELVIS/SPINE - Future  Off fosamax x 1 year - due for repeat DEXA   6. Essential hypertension I10 Well controlled, continue current medications     7. Polycystic kidney Q61.3 Also with polycystic liver - patient not interested in further work up at this time - will alert me if having pain or enlargement for further imaging, work up   8. Long-term (current) use of anticoagulants Z79.01    9. Kidney replaced by transplant Z94.0 calcitRIOL (ROCALTROL) 0.5 MCG capsule     sulfamethoxazole-trimethoprim (BACTRIM) 400-80 MG per tablet     diltiazem (TIAZAC) 240 MG 24 hr ER beaded capsule     cloNIDine (CATAPRES) 0.2 MG tablet     furosemide (LASIX) 80 MG tablet     Comprehensive metabolic panel     CBC with platelets     Phosphorus     Cyclosporine     UROLOGY ADULT REFERRAL                       10. Impaired fasting glucose R73.01 Recheck today   11. History of CVA (cerebrovascular accident) Z86.73 On coumadin   12. Polycystic liver disease Q44.6 See above   13. Dyspnea on exertion R06.09 Concern for development of COPD - plan on formal pulmonary function tests, trial of albuterol.  Patient  "to alert me if not improving.  If results of this testing not conclusive, would consider cardiac stress testing.  Deconditioning likely also contributing to dyspnea.   14. Pure hypercholesterolemia E78.00 simvastatin (ZOCOR) 20 MG tablet   15. Benign essential hypertension I10 diltiazem (TIAZAC) 240 MG 24 hr ER beaded capsule     cloNIDine (CATAPRES) 0.2 MG tablet  Well controlled, continue current medications     16. Urinary frequency R35.0 *UA reflex to Microscopic and Culture ([facility] (except [place] and [place])     Urine Culture Aerobic Bacterial   17. Recurrent UTI N39.0 cefdinir (OMNICEF) 300 MG capsule     UROLOGY ADULT REFERRAL  UTI on UA today - concern for recurrent UTIs in setting of immunosuppression and transplanted kidney.  Treat today based on past culture results, referred to urology for further discussion of recurrent UTI.   18. Hip pain, left M25.552 Hip replaced 20 years ago - to alert me if not improving and will consider imaging, orthopedics referral   19. Visit for screening mammogram Z12.31 *MA Screening Digital Bilateral       End of Life Planning:  Patient currently has an advanced directive: No.  I have verified the patient's ablity to prepare an advanced directive/make health care decisions.  COUNSELING:  Special attention given to:       Healthy diet/nutrition       Vision screening       Osteoporosis Prevention/Bone Health       Colon cancer screening        Estimated body mass index is 42.37 kg/(m^2) as calculated from the following:    Height as of this encounter: 5' 5\" (1.651 m).    Weight as of this encounter: 254 lb 9.6 oz (115.5 kg).  Weight management plan: Discussed healthy diet and exercise guidelines and patient will follow up in 12 months in clinic to re-evaluate.   reports that she quit smoking about 27 years ago. She has never used smokeless tobacco.        Appropriate preventive services were discussed with this patient, including applicable " screening as appropriate for cardiovascular disease, diabetes, osteopenia/osteoporosis, and glaucoma.  As appropriate for age/gender, discussed screening for colorectal cancer, prostate cancer, breast cancer, and cervical cancer. Checklist reviewing preventive services available has been given to the patient.    Reviewed patients plan of care and provided an AVS. The Complex Care Plan (for patients with higher acuity and needing more deliberate coordination of services) for Elise meets the Care Plan requirement. This Care Plan has been established and reviewed with the Patient.    Counseling Resources:  ATP IV Guidelines  Pooled Cohorts Equation Calculator  Breast Cancer Risk Calculator  FRAX Risk Assessment  ICSI Preventive Guidelines  Dietary Guidelines for Americans, 2010  USDA's MyPlate  ASA Prophylaxis  Lung CA Screening    Cyn Crow MD  [facility]

## 2017-08-10 NOTE — PATIENT INSTRUCTIONS
Call [phone] to schedule lung function tests at [facility].  Try using an albuterol inhaler to see if this helps with shortness of breath/wheezing    Labs today - including urine test - will restart antibiotics if the urine shows an infection and send you to see urology.    Medications refilled.    Time for repeat mammogram - we can do this here now if needed.    Repeat DEXA scan - order placed for this - we can have this done on the first floor here now too - [phone].                Preventive Health Recommendations    Female Ages 65 +    Yearly exam:     See your health care provider every year in order to  o Review health changes.   o Discuss preventive care.    o Review your medicines if your doctor has prescribed any.      You no longer need a yearly Pap test unless you've had an abnormal Pap test in the past 10 years. If you have vaginal symptoms, such as bleeding or discharge, be sure to talk with your provider about a Pap test.      Every 1 to 2 years, have a mammogram.  If you are over 69, talk with your health care provider about whether or not you want to continue having screening mammograms.      Every 10 years, have a colonoscopy. Or, have a yearly FIT test (stool test). These exams will check for colon cancer.       Have a cholesterol test every 5 years, or more often if your doctor advises it.       Have a diabetes test (fasting glucose) every three years. If you are at risk for diabetes, you should have this test more often.       At age 65, have a bone density scan (DEXA) to check for osteoporosis (brittle bone disease).    Shots:    Get a flu shot each year.    Get a tetanus shot every 10 years.    Talk to your doctor about your pneumonia vaccines. There are now two you should receive - Pneumovax (PPSV 23) and Prevnar (PCV 13).    Talk to your doctor about the shingles vaccine.    Talk to your doctor about the hepatitis B vaccine.    Nutrition:     Eat at least 5 servings of fruits  and vegetables each day.      Eat whole-grain bread, whole-wheat pasta and brown rice instead of white grains and rice.      Talk to your provider about Calcium and Vitamin D.     Lifestyle    Exercise at least 150 minutes a week (30 minutes a day, 5 days a week). This will help you control your weight and prevent disease.      Limit alcohol to one drink per day.      No smoking.       Wear sunscreen to prevent skin cancer.       See your dentist twice a year for an exam and cleaning.      See your eye doctor every 1 to 2 years to screen for conditions such as glaucoma, macular degeneration and cataracts.

## 2017-08-10 NOTE — MR AVS SNAPSHOT
After Visit Summary   8/10/2017    Elise Mason    MRN: [id number]           Patient Information     Date Of Birth          1945        Visit Information        Provider Department      8/10/2017 8:40 AM Cyn Crow MD [facility]        Today's Diagnoses     Asymptomatic postmenopausal status    -  1    Osteoporosis, unspecified osteoporosis type, unspecified pathological fracture presence        Essential hypertension        Polycystic kidney        Long-term (current) use of anticoagulants        Kidney replaced by transplant        Impaired fasting glucose        History of CVA (cerebrovascular accident)        Cerebral artery occlusion with cerebral infarction (H)        Polycystic liver disease        Immunosuppressed status (H)        Routine history and physical examination of adult        Visit for screening mammogram        Dyspnea on exertion        Pure hypercholesterolemia        Benign essential hypertension        Urinary frequency          Care Instructions    Call [phone] to schedule lung function tests at [facility].  Try using an albuterol inhaler to see if this helps with shortness of breath/wheezing    Labs today - including urine test - will restart antibiotics if the urine shows an infection and send you to see urology.    Medications refilled.    Time for repeat mammogram - we can do this here now if needed.    Repeat DEXA scan - order placed for this - we can have this done on the first floor here now too - [phone].                Preventive Health Recommendations    Female Ages 65 +    Yearly exam:     See your health care provider every year in order to  o Review health changes.   o Discuss preventive care.    o Review your medicines if your doctor has prescribed any.      You no longer need a yearly Pap test unless you've had an abnormal Pap test in the past 10 years. If you have vaginal symptoms, such as bleeding or discharge,  be sure to talk with your provider about a Pap test.      Every 1 to 2 years, have a mammogram.  If you are over 69, talk with your health care provider about whether or not you want to continue having screening mammograms.      Every 10 years, have a colonoscopy. Or, have a yearly FIT test (stool test). These exams will check for colon cancer.       Have a cholesterol test every 5 years, or more often if your doctor advises it.       Have a diabetes test (fasting glucose) every three years. If you are at risk for diabetes, you should have this test more often.       At age 65, have a bone density scan (DEXA) to check for osteoporosis (brittle bone disease).    Shots:    Get a flu shot each year.    Get a tetanus shot every 10 years.    Talk to your doctor about your pneumonia vaccines. There are now two you should receive - Pneumovax (PPSV 23) and Prevnar (PCV 13).    Talk to your doctor about the shingles vaccine.    Talk to your doctor about the hepatitis B vaccine.    Nutrition:     Eat at least 5 servings of fruits and vegetables each day.      Eat whole-grain bread, whole-wheat pasta and brown rice instead of white grains and rice.      Talk to your provider about Calcium and Vitamin D.     Lifestyle    Exercise at least 150 minutes a week (30 minutes a day, 5 days a week). This will help you control your weight and prevent disease.      Limit alcohol to one drink per day.      No smoking.       Wear sunscreen to prevent skin cancer.       See your dentist twice a year for an exam and cleaning.      See your eye doctor every 1 to 2 years to screen for conditions such as glaucoma, macular degeneration and cataracts.          Follow-ups after your visit        Additional Services     INR CLINIC REFERRAL       Your provider has referred you to INR Services.    Please be aware that coverage of these services is subject to the terms and limitations of your health insurance plan.  Call member services at your health  plan with any benefit or coverage questions.    Indication for Anticoagulation: CVA/TIA  If nonstandard INR is desired, indicate goal range and explanation: 2-3  Expected Duration of Therapy: Lifetime                  Your next 10 appointments already scheduled     Aug 25, 2017  8:45 AM CDT   Anticoagulation Visit with  ANTICOAGULATION CLINIC   [place] [facility] ([facility] [place])    [address]  [address]  [place] [address]   [phone]              Future tests that were ordered for you today     Open Future Orders        Priority Expected Expires Ordered    DEXA HIP/PELVIS/SPINE - Future Routine  8/10/2018 8/10/2017    *MA Screening Digital Bilateral Routine  8/10/2018 8/10/2017    General PFT Lab (Please always keep checked) Routine  8/10/2018 8/10/2017    Pulmonary Function Test Routine  8/10/2018 8/10/2017            Who to contact     If you have questions or need follow up information about today's clinic visit or your schedule please contact [facility] [place] directly at [phone].  Normal or non-critical lab and imaging results will be communicated to you by Zendeskhart, letter or phone within 4 business days after the clinic has received the results. If you do not hear from us within 7 days, please contact the clinic through [facility] or phone. If you have a critical or abnormal lab result, we will notify you by phone as soon as possible.  Submit refill requests through Pop.it or call your pharmacy and they will forward the refill request to us. Please allow 3 business days for your refill to be completed.          Additional Information About Your Visit        Pop.it Information     Pop.it gives you secure access to your electronic health record. If you see a primary care provider, you can also send messages to your care team and make appointments. If you have questions, please call your primary care clinic.  If you do not have a primary care provider, please  "call [phone] and they will assist you.        Care EveryWhere ID     This is your Care EveryWhere ID. This could be used by other organizations to access your [facility] medical records  [id number]        Your Vitals Were     Pulse Temperature Height Pulse Oximetry BMI (Body Mass Index)       73 98.1  F (36.7  C) (Tympanic) 5' 5\" (1.651 m) 94% 42.37 kg/m2        Blood Pressure from Last 3 Encounters:   08/10/17 126/80   05/09/17 112/64   08/15/16 128/70    Weight from Last 3 Encounters:   08/10/17 254 lb 9.6 oz (115.5 kg)   05/09/17 260 lb 1.6 oz (118 kg)   08/15/16 257 lb (116.6 kg)              We Performed the Following     *UA reflex to Microscopic and Culture ([place] and [facility] (except [place] and [place])     CBC with platelets     Comprehensive metabolic panel     Cyclosporine     INR CLINIC REFERRAL     Lipid panel reflex to direct LDL     Phosphorus          Where to get your medicines      These medications were sent to [place] [address] [place] [facility] [place] - [address]  [address] [place] [address]     Phone:  [phone]     calcitRIOL 0.5 MCG capsule    cloNIDine 0.2 MG tablet    diltiazem 240 MG 24 hr ER beaded capsule    furosemide 80 MG tablet    simvastatin 20 MG tablet    sulfamethoxazole-trimethoprim 400-80 MG per tablet          Primary Care Provider Office Phone # Fax #    Cyn Crow [phone]       [facility] [place] [address]        Equal Access to Services     [facility] AH: Hadii aad ku hadasho Soomaali, waaxda luqadaha, qaybta kaalmada adeegyada, bibi yee. So [facility] [phone].    ATENCIÓN: Si habla español, tiene a mehta disposición servicios gratuitos de asistencia lingüística. Llame al [phone].    We comply with applicable federal civil rights laws and [place] laws. We do not discriminate on the basis of race, color, national origin, age, disability sex, sexual orientation or " gender identity.            Thank you!     Thank you for choosing [facility] [place]  for your care. Our goal is always to provide you with excellent care. Hearing back from our patients is one way we can continue to improve our services. Please take a few minutes to complete the written survey that you may receive in the mail after your visit with us. Thank you!             Your Updated Medication List - Protect others around you: Learn how to safely use, store and throw away your medicines at www.disposemymeds.org.          This list is accurate as of: 8/10/17  9:14 AM.  Always use your most recent med list.                   Brand Name Dispense Instructions for use Diagnosis    * ACE NOT PRESCRIBED (INTENTIONAL)      by Other route continuous prn.    Polycystic kidney, unspecified type, Kidney replaced by transplant       amoxicillin 500 MG capsule    AMOXIL    4 capsule    TAKE 4 CAPSULES BY MOUTH 1 HOURS PRIOR TO PROCEDURE.    Kidney replaced by transplant       * ARB NOT PRESCRIBED (INTENTIONAL)      by Other route continuous prn.    Polycystic kidney, unspecified type, Kidney replaced by transplant       ASPIRIN NOT PRESCRIBED    INTENTIONAL    0 each    1 each continuous prn for other Antiplatelet medication not prescribed intentionally due to Current anticoagulant therapy (warfarin/enoxaparin)    History of CVA (cerebrovascular accident)       azaTHIOprine 50 MG tablet    IMURAN    90    Take three tablets by mouth daily        betamethasone dipropionate 0.05 % cream    DIPROSONE    45 g    Apply sparingly to affected area twice daily as needed.  Do not apply to face.    Dermatitis       calcitRIOL 0.5 MCG capsule    ROCALTROL    90 capsule    Take 1 capsule (0.5 mcg) by mouth daily    Kidney replaced by transplant       cloNIDine 0.2 MG tablet    CATAPRES    180 tablet    Take 1 tablet (0.2 mg) by mouth 2 times daily    Kidney replaced by transplant, Benign essential hypertension       cyanocolbalamin  500 MCG tablet    vitamin  B-12     OTC    1 tab daily per transplant clinic        cycloSPORINE modified capsule     540 capsule    Take 3 capsules by mouth 2 times daily.    Kidney replaced by transplant       diltiazem 240 MG 24 hr ER beaded capsule    TIAZAC    90 capsule    Take 1 capsule (240 mg) by mouth daily    Kidney replaced by transplant, Benign essential hypertension       felodipine ER 2.5 MG 24 hr tablet    PLENDIL    90 tablet    TAKE 1 TABLET (2.5 MG) BY MOUTH DAILY    Kidney replaced by transplant, Benign essential hypertension       ferrous sulfate 325 (65 FE) MG tablet    IRON    90 tablet    Take 1 tablet by mouth daily.    Kidney replaced by transplant       folic acid 1 MG tablet    FOLVITE    180 tablet    TAKE 2 TABLETS (2,000 MCG) BY MOUTH DAILY    Kidney replaced by transplant       furosemide 80 MG tablet    LASIX    180 tablet    Take 1 tablet (80 mg) by mouth 2 times daily    Kidney replaced by transplant       GLUCOSAMINE SULFATE PO      Take 750 mLs by mouth daily        OMEGA 3 550 MG Caps   Generic drug:  LINOLENIC ACID      Take 2,000 mg by mouth 2 times daily.        * order for DME     2 each    Please dispense 2 pair of knee high compression 20-30 stockings    Kidney replaced by transplant, Polycystic kidney, unspecified type, Edema       pyridoxine 100 MG tablet    VITAMIN B-6     OTC   2 tabs daily per transplant clinic        simvastatin 20 MG tablet    ZOCOR    90 tablet    Take 1 tablet (20 mg) by mouth At Bedtime    Pure hypercholesterolemia       sulfamethoxazole-trimethoprim 400-80 MG per tablet    BACTRIM    90 tablet    Take 1 tablet by mouth daily    Kidney replaced by transplant       TUMS PO      Take 2 tablets by mouth 3 times daily.        warfarin 2.5 MG tablet    COUMADIN    125 tablet    Take 5mg MWF, 2.5mg TTSS or as directed by INR clinic    Long term (current) use of anticoagulants       * Notice:  This list has 3 medication(s) that are the same as other  medications prescribed for you. Read the directions carefully, and ask your doctor or other care provider to review them with you.

## 2017-08-10 NOTE — NURSING NOTE
"Chief Complaint   Patient presents with     Physical       Initial /80 (BP Location: Right arm, Patient Position: Right side, Cuff Size: Adult Regular)  Pulse 73  Temp 98.1  F (36.7  C) (Tympanic)  Ht 5' 5\" (1.651 m)  Wt 254 lb 9.6 oz (115.5 kg)  SpO2 94%  BMI 42.37 kg/m2 Estimated body mass index is 42.37 kg/(m^2) as calculated from the following:    Height as of this encounter: 5' 5\" (1.651 m).    Weight as of this encounter: 254 lb 9.6 oz (115.5 kg).  Medication Reconciliation: complete  "

## 2017-08-11 DIAGNOSIS — Z94.0 KIDNEY REPLACED BY TRANSPLANT: ICD-10-CM

## 2017-08-11 DIAGNOSIS — E78.00 PURE HYPERCHOLESTEROLEMIA: ICD-10-CM

## 2017-08-11 RX ORDER — ALBUTEROL SULFATE 90 UG/1
2 AEROSOL, METERED RESPIRATORY (INHALATION) EVERY 4 HOURS PRN
Qty: 1 INHALER | Refills: 1 | Status: SHIPPED | OUTPATIENT
Start: 2017-08-11 | End: 2017-12-01

## 2017-08-11 NOTE — TELEPHONE ENCOUNTER
furosemide (LASIX) 80 MG tablet      Last Written Prescription Date: 8/10/17  Last Fill Quantity: 180, # refills: 3  Last Office Visit with G, P or [facility] prescribing provider: 8/10/17       Potassium   Date Value Ref Range Status   03/16/2017 3.7 3.4 - 5.3 mmol/L Final     Creatinine   Date Value Ref Range Status   03/16/2017 1.95 (H) 0.52 - 1.04 mg/dL Final     BP Readings from Last 3 Encounters:   08/10/17 126/80   05/09/17 112/64   08/15/16 128/70

## 2017-08-11 NOTE — TELEPHONE ENCOUNTER
sulfamethoxazole-trimethoprim (BACTRIM) 400-80 MG per tablet      Last Written Prescription Date:  8/10/17  Last Fill Quantity: 90,   # refills: 3  Last Office Visit with [facility] or [facility] prescribing provider: 8/10/17  Future Office visit:       Routing refill request to provider for review/approval because:  Drug not on the [facility], [facility] or [facility] refill protocol or controlled substance

## 2017-08-11 NOTE — TELEPHONE ENCOUNTER
simvastatin (ZOCOR) 20 MG tablet     Last Written Prescription Date: 8/10/17  Last Fill Quantity: 90, # refills: 3  Last Office Visit with G, P or [facility] prescribing provider: 8/10/17       Lab Results   Component Value Date    CHOL 163 03/16/2017     Lab Results   Component Value Date    HDL 75 03/16/2017     Lab Results   Component Value Date    LDL 56 03/16/2017     Lab Results   Component Value Date    TRIG 162 03/16/2017     Lab Results   Component Value Date    CHOLHDLRATIO 2.0 02/06/2015

## 2017-08-14 RX ORDER — SIMVASTATIN 20 MG
TABLET ORAL
Qty: 90 TABLET | Refills: 3 | OUTPATIENT
Start: 2017-08-14

## 2017-08-14 RX ORDER — FUROSEMIDE 80 MG
TABLET ORAL
Qty: 180 TABLET | Refills: 3 | OUTPATIENT
Start: 2017-08-14

## 2017-08-14 RX ORDER — SULFAMETHOXAZOLE AND TRIMETHOPRIM 400; 80 MG/1; MG/1
TABLET ORAL
Qty: 90 TABLET | Refills: 3 | OUTPATIENT
Start: 2017-08-14

## 2017-08-15 PROBLEM — E66.01 MORBID OBESITY, UNSPECIFIED OBESITY TYPE (H): Status: ACTIVE | Noted: 2017-08-15

## 2017-08-15 PROBLEM — E66.01 MORBID OBESITY, UNSPECIFIED OBESITY TYPE (H): Status: ACTIVE | Noted: 2017-08-10

## 2017-08-22 ENCOUNTER — HOSPITAL ENCOUNTER (OUTPATIENT)
Dept: RESPIRATORY THERAPY | Facility: CLINIC | Age: 72
Discharge: HOME | End: 2017-08-22
Attending: PEDIATRICS | Admitting: PEDIATRICS
Payer: COMMERCIAL

## 2017-08-22 VITALS — OXYGEN SATURATION: 93 %

## 2017-08-22 DIAGNOSIS — R06.09 DYSPNEA ON EXERTION: ICD-10-CM

## 2017-08-22 LAB
DLCOUNC-%PRED-PRE: 108 %
DLCOUNC-PRE: 21.94 ML/MIN/MMHG
DLCOUNC-PRED: 20.3 ML/MIN/MMHG
ERV-%PRED-PRE: 186 %
ERV-PRE: 0.11 L
ERV-PRED: 0.06 L
EXPTIME-PRE: 4.95 SEC
FEF2575-%PRED-POST: 114 %
FEF2575-%PRED-PRE: 70 %
FEF2575-POST: 2.08 L/SEC
FEF2575-PRE: 1.27 L/SEC
FEF2575-PRED: 1.81 L/SEC
FEFMAX-%PRED-PRE: 86 %
FEFMAX-PRE: 4.78 L/SEC
FEFMAX-PRED: 5.5 L/SEC
FEV1-%PRED-PRE: 75 %
FEV1-PRE: 1.61 L
FEV1FEV6-PRE: 77 %
FEV1FEV6-PRED: 79 %
FEV1FVC-PRE: 77 %
FEV1FVC-PRED: 78 %
FEV1SVC-PRE: 64 %
FEV1SVC-PRED: 71 %
FIFMAX-PRE: 2.55 L/SEC
FRCPLETH-%PRED-PRE: 100 %
FRCPLETH-PRE: 2.73 L
FRCPLETH-PRED: 2.71 L
FVC-%PRED-PRE: 74 %
FVC-PRE: 2.09 L
FVC-PRED: 2.79 L
IC-%PRED-PRE: 80 %
IC-PRE: 2.39 L
IC-PRED: 2.96 L
RVPLETH-%PRED-PRE: 125 %
RVPLETH-PRE: 2.61 L
RVPLETH-PRED: 2.09 L
TLCPLETH-%PRED-PRE: 103 %
TLCPLETH-PRE: 5.12 L
TLCPLETH-PRED: 4.94 L
VA-%PRED-PRE: 83 %
VA-PRE: 4.21 L
VC-%PRED-PRE: 83 %
VC-PRE: 2.51 L
VC-PRED: 3.02 L

## 2017-08-22 PROCEDURE — 25000125 ZZHC RX 250

## 2017-08-22 PROCEDURE — 94726 PLETHYSMOGRAPHY LUNG VOLUMES: CPT

## 2017-08-22 PROCEDURE — 94060 EVALUATION OF WHEEZING: CPT

## 2017-08-22 PROCEDURE — 94729 DIFFUSING CAPACITY: CPT

## 2017-08-22 RX ORDER — ALBUTEROL SULFATE 0.83 MG/ML
SOLUTION RESPIRATORY (INHALATION)
Status: COMPLETED
Start: 2017-08-22 | End: 2017-08-22

## 2017-08-22 RX ORDER — ALBUTEROL SULFATE 0.83 MG/ML
2.5 SOLUTION RESPIRATORY (INHALATION)
Status: COMPLETED | OUTPATIENT
Start: 2017-08-22 | End: 2017-08-22

## 2017-08-22 RX ADMIN — ALBUTEROL SULFATE 2.5 MG: 2.5 SOLUTION RESPIRATORY (INHALATION) at 12:00

## 2017-08-22 RX ADMIN — ALBUTEROL SULFATE 2.5 MG: 0.83 SOLUTION RESPIRATORY (INHALATION) at 12:00

## 2017-08-22 NOTE — PROGRESS NOTES
PFT Note:  Pt completed pulmonary function testing with DLCO.  Pre/post flow volume loops with 2.5mg Albuterol nebulizer.  Good Pt effort and cooperation.     Spirometry  Meets all ATS-ERS recommendations.     Plethysmography  All plethysmographic measurements meet ATS-ERS recommendations.    DLCO  Meets all ATS-ERS recommendations.  DLCO is an average of 2 maneuvers.  DLCO is not corrected for Hgb.    August 22, 2017.12:31 PM  Lv Serna

## 2017-08-24 NOTE — PROCEDURES
Please see medical chart for graphs and statistics related to this report.       REFERRING PHYSICIAN:   Cyn Crow                                     TECHNICIAN:   Lv Serna   DIAGNOSIS:   GOETZ, HTN, GERD, Morbid obesity, CVA, Impaired renal function   HEIGHT:   64.00 inches                                     WEIGHT:   256.00 Lbs.       DYSPNEA:  Walking less than 100 yards       COUGH:  Productive       WHEEZE:   Rare      TOBACCO PROD:   Cigarette   YEARS SMOKED:   25.0   PKS/DAY:   1.5   YEARS QUIT:    25.0                                                              MEDICATIONS:   Albuterol HFA        POST-TEST COMMENTS:   Patient completed pulmonary function testing with DLCO.  Pre/post flow volume loops with 2.5 mg Albuterol nebulizer.  Good patient effort and cooperation.  All testing meets ATS-ERS recommendations.  DLCO is an average of 2 maneuvers.  DLCO is not corrected for Hgb.         INTERPRETATION:      1.  Mild ventilatory defect   2.  Small airways show positive response to bronchodilator   3.  Air trapping   4.  Normal gas transfer         CHRISTOPHER KEARNS MD             D: 2017 08:27   T: 2017 08:36   MT: BRIAN      Name:     CARLIN SMALL   MRN:      [id number]        Account:        [id number]   :      1945           Procedure Date: 2017      Document: [id number]       cc: Cyn Crow MD

## 2017-08-25 ENCOUNTER — ANTICOAGULATION THERAPY VISIT (OUTPATIENT)
Dept: NURSING | Facility: CLINIC | Age: 72
End: 2017-08-25
Payer: COMMERCIAL

## 2017-08-25 DIAGNOSIS — I10 BENIGN ESSENTIAL HYPERTENSION: ICD-10-CM

## 2017-08-25 DIAGNOSIS — Z86.73 HISTORY OF CVA (CEREBROVASCULAR ACCIDENT): ICD-10-CM

## 2017-08-25 DIAGNOSIS — Z79.01 LONG-TERM (CURRENT) USE OF ANTICOAGULANTS: ICD-10-CM

## 2017-08-25 DIAGNOSIS — Z94.0 KIDNEY REPLACED BY TRANSPLANT: ICD-10-CM

## 2017-08-25 DIAGNOSIS — I63.50 CEREBRAL ARTERY OCCLUSION WITH CEREBRAL INFARCTION (H): ICD-10-CM

## 2017-08-25 LAB — INR POINT OF CARE: 2.4 (ref 0.86–1.14)

## 2017-08-25 PROCEDURE — 99207 ZZC NO CHARGE NURSE ONLY: CPT

## 2017-08-25 PROCEDURE — 36416 COLLJ CAPILLARY BLOOD SPEC: CPT

## 2017-08-25 PROCEDURE — 85610 PROTHROMBIN TIME: CPT | Mod: QW

## 2017-08-25 NOTE — MR AVS SNAPSHOT
Elise Mason   8/25/2017 8:45 AM   Anticoagulation Therapy Visit    Description:  71 year old female   Provider:  [facility]   Department:   Nurse           INR as of 8/25/2017     Today's INR 2.4      Anticoagulation Summary as of 8/25/2017     INR goal 2.0-3.0   Today's INR 2.4   Full instructions 5 mg on Mon, Wed, Fri; 2.5 mg all other days   Next INR check 10/6/2017    Indications   Long-term (current) use of anticoagulants [Z79.01]  Cerebral artery occlusion with cerebral infarction (H) [I63.50]  History of CVA (cerebrovascular accident) [Z86.73]         Your next Anticoagulation Clinic appointment(s)     Oct 06, 2017  8:45 AM CDT   Anticoagulation Visit with  ANTICOAGULATION CLINIC   [facility] ([facility])    [address] [place] [address]  [address]  [facility] [address] [phone]              Contact Numbers     [facility]  Please call  [phone] to cancel and/or reschedule your appointment   Please call  [phone] with any problems or questions regarding your therapy.        August 2017 Details    Sun Mon Tue Wed Thu Fri Sat       1               2               3               4               5                 6               7               8               9               10               11               12                 13               14               15               16               17               18               19                 20               21               22               23               24               25      5 mg   See details      26      2.5 mg           27      2.5 mg         28      5 mg         29      2.5 mg         30      5 mg         31      2.5 mg            Date Details   08/25 This INR check               How to take your warfarin dose     To take:  2.5 mg Take 1 of the 2.5 mg tablets.    To take:  5 mg Take 2 of the 2.5 mg tablets.           September 2017 Details    Sun Mon Tue Wed Thu Fri Sat           1      5 mg         2      2.5 mg           3      2.5 mg         4      5 mg         5      2.5 mg         6      5 mg         7      2.5 mg         8      5 mg         9      2.5 mg           10      2.5 mg         11      5 mg         12      2.5 mg         13      5 mg         14      2.5 mg         15      5 mg         16      2.5 mg           17      2.5 mg         18      5 mg         19      2.5 mg         20      5 mg         21      2.5 mg         22      5 mg         23      2.5 mg           24      2.5 mg         25      5 mg         26      2.5 mg         27      5 mg         28      2.5 mg         29      5 mg         30      2.5 mg          Date Details   No additional details            How to take your warfarin dose     To take:  2.5 mg Take 1 of the 2.5 mg tablets.    To take:  5 mg Take 2 of the 2.5 mg tablets.           October 2017 Details    Sun Mon Tue Wed Thu Fri Sat     1      2.5 mg         2      5 mg         3      2.5 mg         4      5 mg         5      2.5 mg         6            7                 8               9               10               11               12               13               14                 15               16               17               18               19               20               21                 22               23               24               25               26               27               28                 29               30               31                    Date Details   No additional details    Date of next INR:  10/6/2017         How to take your warfarin dose     To take:  2.5 mg Take 1 of the 2.5 mg tablets.    To take:  5 mg Take 2 of the 2.5 mg tablets.

## 2017-08-25 NOTE — PROGRESS NOTES
ANTICOAGULATION FOLLOW-UP CLINIC VISIT    Patient Name:  Elise Mason  Date:  8/25/2017  Contact Type:  Face to Face    SUBJECTIVE:     Patient Findings     Positives No Problem Findings           OBJECTIVE    INR Protime   Date Value Ref Range Status   08/25/2017 2.4 (A) 0.86 - 1.14 Final       ASSESSMENT / PLAN  INR assessment THER    Recheck INR In: 6 WEEKS    INR Location Clinic      Anticoagulation Summary as of 8/25/2017     INR goal 2.0-3.0   Today's INR 2.4   Maintenance plan 5 mg (2.5 mg x 2) on Mon, Wed, Fri; 2.5 mg (2.5 mg x 1) all other days   Full instructions 5 mg on Mon, Wed, Fri; 2.5 mg all other days   Weekly total 25 mg   No change documented Fallon Velazco RN   Plan last modified Fallon Velazco RN (3/9/2017)   Next INR check 10/6/2017   Target end date Indefinite    Indications   Long-term (current) use of anticoagulants [Z79.01]  Cerebral artery occlusion with cerebral infarction (H) [I63.50]  History of CVA (cerebrovascular accident) [Z86.73]         Anticoagulation Episode Summary     INR check location [facility]    Preferred lab     Send INR reminders to [facility]    Comments 2.5mg tabs - dyllan dose // CALENDAR       Anticoagulation Care Providers     Provider Role Specialty Phone number    Cyn Crow MD  Internal Medicine [phone]            See the Encounter Report to view Anticoagulation Flowsheet and Dosing Calendar (Go to Encounters tab in chart review, and find the Anticoagulation Therapy Visit)        Fallon Velazco RN

## 2017-08-26 NOTE — TELEPHONE ENCOUNTER
DUPLICATE    diltiazem (TIAZAC) 240 MG 24 hr ER beaded capsule was refilled on 8/10/2017, disp 90, 3 refills.

## 2017-08-28 RX ORDER — DILTIAZEM HYDROCHLORIDE 240 MG/1
CAPSULE, EXTENDED RELEASE ORAL
Qty: 90 CAPSULE | Refills: 3 | OUTPATIENT
Start: 2017-08-28

## 2017-08-28 NOTE — TELEPHONE ENCOUNTER
Chart review confirms medication was sent 8/10/17 to requesting pharmacy, one year supply. Sent back as denied/duplicate.    Janis Johnson, MSN, RN-BC  Care Coordinator

## 2017-09-02 DIAGNOSIS — I10 BENIGN ESSENTIAL HYPERTENSION: ICD-10-CM

## 2017-09-02 DIAGNOSIS — Z94.0 KIDNEY REPLACED BY TRANSPLANT: ICD-10-CM

## 2017-09-02 NOTE — TELEPHONE ENCOUNTER
Duplicate.    cloNIDine (CATAPRES) 0.2 MG tablet was filled on 08/10/2017 with 180 tablets and 3 refills.

## 2017-09-06 RX ORDER — CLONIDINE HYDROCHLORIDE 0.2 MG/1
TABLET ORAL
Qty: 180 TABLET | Refills: 3 | OUTPATIENT
Start: 2017-09-06

## 2017-09-15 ENCOUNTER — ALLIED HEALTH/NURSE VISIT (OUTPATIENT)
Dept: NURSING | Facility: CLINIC | Age: 72
End: 2017-09-15
Payer: COMMERCIAL

## 2017-09-15 DIAGNOSIS — D84.9 IMMUNOSUPPRESSED STATUS (H): ICD-10-CM

## 2017-09-15 DIAGNOSIS — Z94.0 KIDNEY REPLACED BY TRANSPLANT: ICD-10-CM

## 2017-09-15 DIAGNOSIS — Z00.00 ROUTINE HISTORY AND PHYSICAL EXAMINATION OF ADULT: ICD-10-CM

## 2017-09-15 DIAGNOSIS — Z23 NEED FOR PROPHYLACTIC VACCINATION AND INOCULATION AGAINST INFLUENZA: Primary | ICD-10-CM

## 2017-09-15 LAB
ALBUMIN SERPL-MCNC: 3.3 G/DL (ref 3.4–5)
ALP SERPL-CCNC: 70 U/L (ref 40–150)
ALT SERPL W P-5'-P-CCNC: 22 U/L (ref 0–50)
ANION GAP SERPL CALCULATED.3IONS-SCNC: 10 MMOL/L (ref 3–14)
AST SERPL W P-5'-P-CCNC: 29 U/L (ref 0–45)
BILIRUB SERPL-MCNC: 0.7 MG/DL (ref 0.2–1.3)
BUN SERPL-MCNC: 40 MG/DL (ref 7–30)
CALCIUM SERPL-MCNC: 10.2 MG/DL (ref 8.5–10.1)
CHLORIDE SERPL-SCNC: 107 MMOL/L (ref 94–109)
CHOLEST SERPL-MCNC: 152 MG/DL
CO2 SERPL-SCNC: 26 MMOL/L (ref 20–32)
CREAT SERPL-MCNC: 1.7 MG/DL (ref 0.52–1.04)
CYCLOSPORINE BLD LC/MS/MS-MCNC: 103 UG/L (ref 50–400)
ERYTHROCYTE [DISTWIDTH] IN BLOOD BY AUTOMATED COUNT: 15.2 % (ref 10–15)
GFR SERPL CREATININE-BSD FRML MDRD: 30 ML/MIN/1.7M2
GLUCOSE SERPL-MCNC: 148 MG/DL (ref 70–99)
HCT VFR BLD AUTO: 44 % (ref 35–47)
HDLC SERPL-MCNC: 86 MG/DL
HGB BLD-MCNC: 14.1 G/DL (ref 11.7–15.7)
LDLC SERPL CALC-MCNC: 45 MG/DL
MCH RBC QN AUTO: 31.5 PG (ref 26.5–33)
MCHC RBC AUTO-ENTMCNC: 32 G/DL (ref 31.5–36.5)
MCV RBC AUTO: 98 FL (ref 78–100)
NONHDLC SERPL-MCNC: 66 MG/DL
PHOSPHATE SERPL-MCNC: 2.3 MG/DL (ref 2.5–4.5)
PLATELET # BLD AUTO: 143 10E9/L (ref 150–450)
POTASSIUM SERPL-SCNC: 3.7 MMOL/L (ref 3.4–5.3)
PROT SERPL-MCNC: 7.4 G/DL (ref 6.8–8.8)
RBC # BLD AUTO: 4.47 10E12/L (ref 3.8–5.2)
SODIUM SERPL-SCNC: 143 MMOL/L (ref 133–144)
TME LAST DOSE: NORMAL H
TRIGL SERPL-MCNC: 105 MG/DL
WBC # BLD AUTO: 8.4 10E9/L (ref 4–11)

## 2017-09-15 PROCEDURE — 80061 LIPID PANEL: CPT | Performed by: PEDIATRICS

## 2017-09-15 PROCEDURE — 80158 DRUG ASSAY CYCLOSPORINE: CPT | Performed by: PEDIATRICS

## 2017-09-15 PROCEDURE — 84100 ASSAY OF PHOSPHORUS: CPT | Performed by: PEDIATRICS

## 2017-09-15 PROCEDURE — 85027 COMPLETE CBC AUTOMATED: CPT | Performed by: PEDIATRICS

## 2017-09-15 PROCEDURE — G0008 ADMIN INFLUENZA VIRUS VAC: HCPCS

## 2017-09-15 PROCEDURE — 80053 COMPREHEN METABOLIC PANEL: CPT | Performed by: PEDIATRICS

## 2017-09-15 PROCEDURE — 36415 COLL VENOUS BLD VENIPUNCTURE: CPT | Performed by: PEDIATRICS

## 2017-09-15 PROCEDURE — 90662 IIV NO PRSV INCREASED AG IM: CPT

## 2017-09-15 PROCEDURE — 99207 ZZC NO CHARGE NURSE ONLY: CPT

## 2017-09-15 NOTE — MR AVS SNAPSHOT
After Visit Summary   9/15/2017    Elise Mason    MRN: [id number]           Patient Information     Date Of Birth          1945        Visit Information        Provider Department      9/15/2017 10:00 AM EA NURSE [facility]        Today's Diagnoses     Need for prophylactic vaccination and inoculation against influenza    -  1       Follow-ups after your visit        Your next 10 appointments already scheduled     Sep 15, 2017 10:00 AM CDT   Nurse Only with EA NURSE AB   [facility]    [address]  [address]  [place] [id number] [phone]            Oct 06, 2017  8:45 AM CDT   Anticoagulation Visit with [facility]   [facility]    [address] [place] [address]  [address]  [place] [address] [phone]              Who to contact     If you have questions or need follow up information about today's clinic visit or your schedule please contact [facility] directly at [phone].  Normal or non-critical lab and imaging results will be communicated to you by BitInstanthart, letter or phone within 4 business days after the clinic has received the results. If you do not hear from us within 7 days, please contact the clinic through Wasabi 3D or phone. If you have a critical or abnormal lab result, we will notify you by phone as soon as possible.  Submit refill requests through Wasabi 3D or call your pharmacy and they will forward the refill request to us. Please allow 3 business days for your refill to be completed.          Additional Information About Your Visit        BitInstanthart Information     Was[employer] 3D gives you secure access to your electronic health record. If you see a primary care provider, you can also send messages to your care team and make appointments. If you have questions, please call your primary care clinic.  If you do not have a primary care provider,  please call [phone] and they will assist you.        Care EveryWhere ID     This is your Care EveryWhere ID. This could be used by other organizations to access your [place] medical records  [id number]         Blood Pressure from Last 3 Encounters:   08/10/17 126/80   05/09/17 112/64   08/15/16 128/70    Weight from Last 3 Encounters:   08/10/17 254 lb 9.6 oz (115.5 kg)   05/09/17 260 lb 1.6 oz (118 kg)   08/15/16 257 lb (116.6 kg)              We Performed the Following     FLU VACCINE, INCREASED ANTIGEN, PRESV FREE, AGE 65+ [12716]        Primary Care Provider Office Phone # Fax #    Cyn Crow [phone] [address] [place] [address]        Equal Access to Services     [facility]: Hadii aad ku hadasho Soomaali, waaxda luqadaha, qaybta kaalmada adeтатьянаyada, bibi partida hayserenity prince . So [facility] [phone].    ATENCIÓN: Si habla español, tiene a mehta disposición servicios gratuitos de asistencia lingüística. Llame al [phone].    We comply with applicable federal civil rights laws and [place] laws. We do not discriminate on the basis of race, color, national origin, age, disability sex, sexual orientation or gender identity.            Thank you!     Thank you for choosing [facility]  for your care. Our goal is always to provide you with excellent care. Hearing back from our patients is one way we can continue to improve our services. Please take a few minutes to complete the written survey that you may receive in the mail after your visit with us. Thank you!             Your Updated Medication List - Protect others around you: Learn how to safely use, store and throw away your medicines at www.disposemymeds.org.          This list is accurate as of: 9/15/17  9:14 AM.  Always use your most recent med list.                   Brand Name Dispense Instructions for use Diagnosis    * ACE NOT PRESCRIBED (INTENTIONAL)      by Other  route continuous prn.    Polycystic kidney, unspecified type, Kidney replaced by transplant       albuterol 108 (90 BASE) MCG/ACT Inhaler    PROAIR HFA/PROVENTIL HFA/VENTOLIN HFA    1 Inhaler    Inhale 2 puffs into the lungs every 4 hours as needed for shortness of breath / dyspnea or wheezing    Dyspnea on exertion       amoxicillin 500 MG capsule    AMOXIL    4 capsule    TAKE 4 CAPSULES BY MOUTH 1 HOURS PRIOR TO PROCEDURE.    Kidney replaced by transplant       * ARB NOT PRESCRIBED (INTENTIONAL)      by Other route continuous prn.    Polycystic kidney, unspecified type, Kidney replaced by transplant       ASPIRIN NOT PRESCRIBED    INTENTIONAL    0 each    1 each continuous prn for other Antiplatelet medication not prescribed intentionally due to Current anticoagulant therapy (warfarin/enoxaparin)    History of CVA (cerebrovascular accident)       azaTHIOprine 50 MG tablet    IMURAN    90    Take three tablets by mouth daily        betamethasone dipropionate 0.05 % cream    DIPROSONE    45 g    Apply sparingly to affected area twice daily as needed.  Do not apply to face.    Dermatitis       calcitRIOL 0.5 MCG capsule    ROCALTROL    90 capsule    Take 1 capsule (0.5 mcg) by mouth daily    Kidney replaced by transplant       cefdinir 300 MG capsule    OMNICEF    20 capsule    Take 1 capsule (300 mg) by mouth 2 times daily    Recurrent UTI       cloNIDine 0.2 MG tablet    CATAPRES    180 tablet    Take 1 tablet (0.2 mg) by mouth 2 times daily    Kidney replaced by transplant, Benign essential hypertension       cyanocolbalamin 500 MCG tablet    vitamin  B-12     OTC    1 tab daily per transplant clinic        cycloSPORINE modified capsule     540 capsule    Take 3 capsules by mouth 2 times daily.    Kidney replaced by transplant       diltiazem 240 MG 24 hr ER beaded capsule    TIAZAC    90 capsule    Take 1 capsule (240 mg) by mouth daily    Kidney replaced by transplant, Benign essential hypertension        felodipine ER 2.5 MG 24 hr tablet    PLENDIL    90 tablet    TAKE 1 TABLET (2.5 MG) BY MOUTH DAILY    Kidney replaced by transplant, Benign essential hypertension       ferrous sulfate 325 (65 FE) MG tablet    IRON    90 tablet    Take 1 tablet by mouth daily.    Kidney replaced by transplant       folic acid 1 MG tablet    FOLVITE    180 tablet    TAKE 2 TABLETS (2,000 MCG) BY MOUTH DAILY    Kidney replaced by transplant       furosemide 80 MG tablet    LASIX    180 tablet    Take 1 tablet (80 mg) by mouth 2 times daily    Kidney replaced by transplant       GLUCOSAMINE SULFATE PO      Take 750 mLs by mouth daily        OMEGA 3 550 MG Caps   Generic drug:  LINOLENIC ACID      Take 2,000 mg by mouth 2 times daily.        * order for DME     2 each    Please dispense 2 pair of knee high compression 20-30 stockings    Kidney replaced by transplant, Polycystic kidney, unspecified type, Edema       pyridoxine 100 MG tablet    VITAMIN B-6     OTC   2 tabs daily per transplant clinic        simvastatin 20 MG tablet    ZOCOR    90 tablet    Take 1 tablet (20 mg) by mouth At Bedtime    Pure hypercholesterolemia       sulfamethoxazole-trimethoprim 400-80 MG per tablet    BACTRIM    90 tablet    Take 1 tablet by mouth daily    Kidney replaced by transplant       TUMS PO      Take 2 tablets by mouth 3 times daily.        warfarin 2.5 MG tablet    COUMADIN    125 tablet    Take 5mg MWF, 2.5mg TTSS or as directed by INR clinic    Long term (current) use of anticoagulants       * Notice:  This list has 3 medication(s) that are the same as other medications prescribed for you. Read the directions carefully, and ask your doctor or other care provider to review them with you.

## 2017-09-15 NOTE — PROGRESS NOTES
Injectable Influenza Immunization Documentation    1.  Are you sick today? (Fever of 100.5 or higher on the day of the clinic)   No    2.  Have you ever had Guillain-[place] Syndrome within 6 weeks of an influenza vaccionation?  No    3. Do you have a life-threatening allergy to eggs?  No    4. Do you have a life-threatening allergy to a component of the vaccine? May include antibiotics, gelatin or latex.  No     5. Have you ever had a reaction to a dose of flu vaccine that needed immediate medical attention?  No     Form completed by Tonya Emery MA

## 2017-10-04 DIAGNOSIS — Z94.0 KIDNEY REPLACED BY TRANSPLANT: ICD-10-CM

## 2017-10-05 RX ORDER — CALCITRIOL 0.5 UG/1
CAPSULE, LIQUID FILLED ORAL
Qty: 90 CAPSULE | Refills: 3 | OUTPATIENT
Start: 2017-10-05

## 2017-10-14 ENCOUNTER — HEALTH MAINTENANCE LETTER (OUTPATIENT)
Age: 72
End: 2017-10-14

## 2017-10-20 ENCOUNTER — ANTICOAGULATION THERAPY VISIT (OUTPATIENT)
Dept: NURSING | Facility: CLINIC | Age: 72
End: 2017-10-20
Payer: COMMERCIAL

## 2017-10-20 ENCOUNTER — RADIANT APPOINTMENT (OUTPATIENT)
Dept: MAMMOGRAPHY | Facility: CLINIC | Age: 72
End: 2017-10-20
Payer: COMMERCIAL

## 2017-10-20 DIAGNOSIS — Z12.31 VISIT FOR SCREENING MAMMOGRAM: ICD-10-CM

## 2017-10-20 DIAGNOSIS — Z79.01 LONG-TERM (CURRENT) USE OF ANTICOAGULANTS: ICD-10-CM

## 2017-10-20 DIAGNOSIS — Z86.73 HISTORY OF CVA (CEREBROVASCULAR ACCIDENT): ICD-10-CM

## 2017-10-20 DIAGNOSIS — I63.50 CEREBRAL ARTERY OCCLUSION WITH CEREBRAL INFARCTION (H): ICD-10-CM

## 2017-10-20 LAB — INR POINT OF CARE: 3.9 (ref 0.86–1.14)

## 2017-10-20 PROCEDURE — 99207 ZZC NO CHARGE NURSE ONLY: CPT

## 2017-10-20 PROCEDURE — G0202 SCR MAMMO BI INCL CAD: HCPCS | Mod: TC

## 2017-10-20 PROCEDURE — 85610 PROTHROMBIN TIME: CPT | Mod: QW

## 2017-10-20 PROCEDURE — 36416 COLLJ CAPILLARY BLOOD SPEC: CPT

## 2017-10-20 NOTE — MR AVS SNAPSHOT
Elise Mason   10/20/2017 11:15 AM   Anticoagulation Therapy Visit    Description:  72 year old female   Provider:   ANTICOAGULATION CLINIC   Department:   Nurse           INR as of 10/20/2017     Today's INR 3.9!      Anticoagulation Summary as of 10/20/2017     INR goal 2.0-3.0   Today's INR 3.9!   Full instructions 10/20: Hold; Otherwise 5 mg on Mon, Wed, Fri; 2.5 mg all other days   Next INR check 11/3/2017    Indications   Long-term (current) use of anticoagulants [Z79.01]  Cerebral artery occlusion with cerebral infarction (H) [I63.50]  History of CVA (cerebrovascular accident) [Z86.73]         Your next Anticoagulation Clinic appointment(s)     Oct 20, 2017 11:15 AM CDT   Anticoagulation Visit with  ANTICOAGULATION CLINIC   [facility] ([facility]    [address] [place] [address]  [address]  [facility] [address] [phone]            Nov 03, 2017 11:15 AM CDT   Anticoagulation Visit with  ANTICOAGULATION CLINIC   [facility] ([facility])    [address] [place] [address]  [address]  [facility] [address] [phone]              Contact Numbers     [facility]  Please call  [phone] to cancel and/or reschedule your appointment   Please call  [phone] with any problems or questions regarding your therapy.        October 2017 Details    Sun Mon Tue Wed Thu Fri Sat     1               2               3               4               5               6               7                 8               9               10               11               12               13               14                 15               16               17               18               19               20      Hold   See details      21      2.5 mg           22      2.5 mg         23      5 mg         24      2.5 mg         25      5 mg         26      2.5 mg         27      5 mg         28      2.5 mg           29      2.5 mg         30      5 mg          31      2.5 mg              Date Details   10/20 This INR check               How to take your warfarin dose     To take:  2.5 mg Take 1 of the 2.5 mg tablets.    To take:  5 mg Take 2 of the 2.5 mg tablets.    Hold Do not take your warfarin dose. See the Details table to the right for additional instructions.                November 2017 Details    Sun Mon Tue Wed Thu Fri Sat        1      5 mg         2      2.5 mg         3            4                 5               6               7               8               9               10               11                 12               13               14               15               16               17               18                 19               20               21               22               23               24               25                 26               27               28               29               30                  Date Details   No additional details    Date of next INR:  11/3/2017         How to take your warfarin dose     To take:  2.5 mg Take 1 of the 2.5 mg tablets.    To take:  5 mg Take 2 of the 2.5 mg tablets.

## 2017-10-20 NOTE — PROGRESS NOTES
ANTICOAGULATION FOLLOW-UP CLINIC VISIT    Patient Name:  Elise Mason  Date:  10/20/2017  Contact Type:  Face to Face    SUBJECTIVE:     Patient Findings     Positives No Problem Findings, Unexplained INR or factor level change    Comments Her sister-in-law  on Friday and the  was Monday.  Very stressful for her.           OBJECTIVE    INR Protime   Date Value Ref Range Status   10/20/2017 3.9 (A) 0.86 - 1.14 Final       ASSESSMENT / PLAN  INR assessment SUPRA    Recheck INR In: 2 WEEKS    INR Location Clinic      Anticoagulation Summary as of 10/20/2017     INR goal 2.0-3.0   Today's INR 3.9!   Maintenance plan 5 mg (2.5 mg x 2) on Mon, Wed, Fri; 2.5 mg (2.5 mg x 1) all other days   Full instructions 10/20: Hold; Otherwise 5 mg on Mon, Wed, Fri; 2.5 mg all other days   Weekly total 25 mg   Plan last modified Fallon Velazco RN (3/9/2017)   Next INR check 11/3/2017   Target end date Indefinite    Indications   Long-term (current) use of anticoagulants [Z79.01]  Cerebral artery occlusion with cerebral infarction (H) [I63.50]  History of CVA (cerebrovascular accident) [Z86.73]         Anticoagulation Episode Summary     [facility]    Preferred lab     Send INR reminders to [facility]    Comments 2.5mg tabs - dyllan dose // CALENDAR       Anticoagulation Care Providers     Provider Role Specialty Phone number    Cyn Crow MD  Internal Medicine [phone]            See the Encounter Report to view Anticoagulation Flowsheet and Dosing Calendar (Go to Encounters tab in chart review, and find the Anticoagulation Therapy Visit)        Fallon Velazco, TANA

## 2017-10-23 ENCOUNTER — RADIANT APPOINTMENT (OUTPATIENT)
Dept: BONE DENSITY | Facility: CLINIC | Age: 72
End: 2017-10-23
Payer: COMMERCIAL

## 2017-10-23 ENCOUNTER — RADIANT APPOINTMENT (OUTPATIENT)
Dept: BONE DENSITY | Facility: CLINIC | Age: 72
End: 2017-10-23
Attending: PEDIATRICS
Payer: COMMERCIAL

## 2017-10-23 DIAGNOSIS — M81.0 OSTEOPOROSIS, UNSPECIFIED OSTEOPOROSIS TYPE, UNSPECIFIED PATHOLOGICAL FRACTURE PRESENCE: ICD-10-CM

## 2017-10-23 DIAGNOSIS — M81.0 AGE RELATED OSTEOPOROSIS, UNSPECIFIED PATHOLOGICAL FRACTURE PRESENCE: ICD-10-CM

## 2017-10-23 PROCEDURE — 77081 DXA BONE DENSITY APPENDICULR: CPT | Mod: 59 | Performed by: FAMILY MEDICINE

## 2017-10-23 PROCEDURE — 77085 DXA BONE DENSITY AXL VRT FX: CPT | Performed by: FAMILY MEDICINE

## 2017-10-30 RX ORDER — ALENDRONATE SODIUM 70 MG/1
70 TABLET ORAL WEEKLY
Qty: 12 TABLET | Refills: 3 | Status: SHIPPED | OUTPATIENT
Start: 2017-10-30 | End: 2017-12-01

## 2017-11-03 ENCOUNTER — ANTICOAGULATION THERAPY VISIT (OUTPATIENT)
Dept: NURSING | Facility: CLINIC | Age: 72
End: 2017-11-03
Payer: COMMERCIAL

## 2017-11-03 DIAGNOSIS — Z86.73 HISTORY OF CVA (CEREBROVASCULAR ACCIDENT): ICD-10-CM

## 2017-11-03 DIAGNOSIS — Z79.01 LONG-TERM (CURRENT) USE OF ANTICOAGULANTS: ICD-10-CM

## 2017-11-03 DIAGNOSIS — I63.50 CEREBRAL ARTERY OCCLUSION WITH CEREBRAL INFARCTION (H): ICD-10-CM

## 2017-11-03 LAB — INR POINT OF CARE: 3.2 (ref 0.86–1.14)

## 2017-11-03 PROCEDURE — 36416 COLLJ CAPILLARY BLOOD SPEC: CPT

## 2017-11-03 PROCEDURE — 99207 ZZC NO CHARGE NURSE ONLY: CPT

## 2017-11-03 PROCEDURE — 85610 PROTHROMBIN TIME: CPT | Mod: QW

## 2017-11-03 NOTE — MR AVS SNAPSHOT
Elise Mason   11/3/2017 11:15 AM   Anticoagulation Therapy Visit    Description:  72 year old female   Provider:   ANTICOAGULATION CLINIC   Department:   Nurse           INR as of 11/3/2017     Today's INR 3.2!      Anticoagulation Summary as of 11/3/2017     INR goal 2.0-3.0   Today's INR 3.2!   Full instructions 11/3: 2.5 mg; Otherwise 5 mg on Mon, Fri; 2.5 mg all other days   Next INR check 12/15/2017    Indications   Long-term (current) use of anticoagulants [Z79.01]  Cerebral artery occlusion with cerebral infarction (H) [I63.50]  History of CVA (cerebrovascular accident) [Z86.73]         Your next Anticoagulation Clinic appointment(s)     Nov 03, 2017 11:15 AM CDT   Anticoagulation Visit with  ANTICOAGULATION CLINIC   [facility] ([facility]    [address] [place] [address]  [address]  [facility] [id number] [phone]            Dec 15, 2017 10:00 AM CST   Anticoagulation Visit with  ANTICOAGULATION CLINIC   [facility] ([facility]    [address] [place] [address]  [address]  [facility] [id number] [phone]              Contact Numbers     [facility]  Please call  [phone] to cancel and/or reschedule your appointment   Please call  [phone] with any problems or questions regarding your therapy.        November 2017 Details    Sun Mon Tue Wed Thu Fri Sat        1               2               3      2.5 mg   See details      4      2.5 mg           5      2.5 mg         6      5 mg         7      2.5 mg         8      2.5 mg         9      2.5 mg         10      5 mg         11      2.5 mg           12      2.5 mg         13      5 mg         14      2.5 mg         15      2.5 mg         16      2.5 mg         17      5 mg         18      2.5 mg           19      2.5 mg         20      5 mg         21      2.5 mg         22      2.5 mg         23      2.5 mg         24      5 mg         25      2.5 mg           26      2.5 mg          27      5 mg         28      2.5 mg         29      2.5 mg         30      2.5 mg            Date Details   11/03 This INR check               How to take your warfarin dose     To take:  2.5 mg Take 1 of the 2.5 mg tablets.    To take:  5 mg Take 2 of the 2.5 mg tablets.           December 2017 Details    Sun Mon Tue Wed Thu Fri Sat          1      5 mg         2      2.5 mg           3      2.5 mg         4      5 mg         5      2.5 mg         6      2.5 mg         7      2.5 mg         8      5 mg         9      2.5 mg           10      2.5 mg         11      5 mg         12      2.5 mg         13      2.5 mg         14      2.5 mg         15            16                 17               18               19               20               21               22               23                 24               25               26               27               28               29               30                 31                      Date Details   No additional details    Date of next INR:  12/15/2017         How to take your warfarin dose     To take:  2.5 mg Take 1 of the 2.5 mg tablets.    To take:  5 mg Take 2 of the 2.5 mg tablets.

## 2017-11-03 NOTE — PROGRESS NOTES
ANTICOAGULATION FOLLOW-UP CLINIC VISIT    Patient Name:  Elise Mason  Date:  11/3/2017  Contact Type:  Face to Face    SUBJECTIVE:     Patient Findings     Positives Change in medications, Change in diet/appetite (Not eating as many green vegetables)    Comments Started on Fosamax.  Per [facility]: No interactions with warfarin found.    She and her  are moving to [place] on 12/2/17.  Lots of stress with packing up and downsizing from a 4 bedroom house to a 2 bedroom place.  Lived in current home for 30 years.             OBJECTIVE    INR Protime   Date Value Ref Range Status   11/03/2017 3.2 (A) 0.86 - 1.14 Final       ASSESSMENT / PLAN  INR assessment SUPRA    Recheck INR In: 6 WEEKS    INR Location Clinic      Anticoagulation Summary as of 11/3/2017     INR goal 2.0-3.0   Today's INR 3.2!   Maintenance plan 5 mg (2.5 mg x 2) on Mon, Fri; 2.5 mg (2.5 mg x 1) all other days   Full instructions 11/3: 2.5 mg; Otherwise 5 mg on Mon, Fri; 2.5 mg all other days   Weekly total 22.5 mg   Plan last modified Fallon Velazco, RN (11/3/2017)   Next INR check 12/15/2017   Target end date Indefinite    Indications   Long-term (current) use of anticoagulants [Z79.01]  Cerebral artery occlusion with cerebral infarction (H) [I63.50]  History of CVA (cerebrovascular accident) [Z86.73]         Anticoagulation Episode Summary     INR check location [facility]    Preferred lab     Send INR reminders to [facility]    Comments 2.5mg tabs - dyllan dose // CALENDAR       Anticoagulation Care Providers     Provider Role Specialty Phone number    Cyn Crwo MD  Internal Medicine [phone]            See the Encounter Report to view Anticoagulation Flowsheet and Dosing Calendar (Go to Encounters tab in chart review, and find the Anticoagulation Therapy Visit)        Fallon Velazco, TANA

## 2017-12-01 ENCOUNTER — TELEPHONE (OUTPATIENT)
Dept: PEDIATRICS | Facility: CLINIC | Age: 72
End: 2017-12-01

## 2017-12-01 DIAGNOSIS — R06.09 DYSPNEA ON EXERTION: ICD-10-CM

## 2017-12-01 DIAGNOSIS — Z94.0 KIDNEY REPLACED BY TRANSPLANT: ICD-10-CM

## 2017-12-01 DIAGNOSIS — I10 BENIGN ESSENTIAL HYPERTENSION: ICD-10-CM

## 2017-12-01 DIAGNOSIS — M81.0 AGE RELATED OSTEOPOROSIS, UNSPECIFIED PATHOLOGICAL FRACTURE PRESENCE: ICD-10-CM

## 2017-12-01 DIAGNOSIS — E78.00 PURE HYPERCHOLESTEROLEMIA: ICD-10-CM

## 2017-12-01 RX ORDER — SULFAMETHOXAZOLE AND TRIMETHOPRIM 400; 80 MG/1; MG/1
1 TABLET ORAL DAILY
Qty: 90 TABLET | Refills: 2 | Status: SHIPPED | OUTPATIENT
Start: 2017-12-01 | End: 2018-01-01

## 2017-12-01 RX ORDER — DILTIAZEM HYDROCHLORIDE 240 MG/1
240 CAPSULE, EXTENDED RELEASE ORAL DAILY
Qty: 90 CAPSULE | Refills: 2 | Status: SHIPPED | OUTPATIENT
Start: 2017-12-01 | End: 2018-08-23

## 2017-12-01 RX ORDER — CLONIDINE HYDROCHLORIDE 0.2 MG/1
0.2 TABLET ORAL 2 TIMES DAILY
Qty: 180 TABLET | Refills: 2 | Status: SHIPPED | OUTPATIENT
Start: 2017-12-01 | End: 2018-08-23

## 2017-12-01 RX ORDER — SIMVASTATIN 20 MG
20 TABLET ORAL AT BEDTIME
Qty: 90 TABLET | Refills: 2 | Status: SHIPPED | OUTPATIENT
Start: 2017-12-01 | End: 2018-08-23

## 2017-12-01 RX ORDER — FOLIC ACID 1 MG/1
TABLET ORAL
Qty: 180 TABLET | Refills: 0 | Status: SHIPPED | OUTPATIENT
Start: 2017-12-01

## 2017-12-01 RX ORDER — ALENDRONATE SODIUM 70 MG/1
70 TABLET ORAL WEEKLY
Qty: 12 TABLET | Refills: 2 | Status: SHIPPED | OUTPATIENT
Start: 2017-12-01 | End: 2018-09-26

## 2017-12-01 RX ORDER — FELODIPINE 2.5 MG/1
TABLET, EXTENDED RELEASE ORAL
Qty: 90 TABLET | Refills: 0 | Status: SHIPPED | OUTPATIENT
Start: 2017-12-01 | End: 2018-05-07

## 2017-12-01 RX ORDER — ALBUTEROL SULFATE 90 UG/1
2 AEROSOL, METERED RESPIRATORY (INHALATION) EVERY 4 HOURS PRN
Qty: 1 INHALER | Refills: 0 | Status: SHIPPED | OUTPATIENT
Start: 2017-12-01 | End: 2018-07-23

## 2017-12-01 RX ORDER — CALCITRIOL 0.5 UG/1
0.5 CAPSULE, LIQUID FILLED ORAL DAILY
Qty: 90 CAPSULE | Refills: 2 | Status: SHIPPED | OUTPATIENT
Start: 2017-12-01 | End: 2018-10-01

## 2017-12-01 RX ORDER — FUROSEMIDE 80 MG
80 TABLET ORAL 2 TIMES DAILY
Qty: 180 TABLET | Refills: 2 | Status: SHIPPED | OUTPATIENT
Start: 2017-12-01 | End: 2018-08-23

## 2017-12-01 NOTE — TELEPHONE ENCOUNTER
Patient left a voicemail stating she'd like to switch her meds to a mail order pharmacy, [facility].     Called patient back, she would like to use [facility] now instead of [facility].     Advised patient to call when she has about a month left of meds then we can transfer refills to [facility].     Patient agrees with plan. She fills her pill box on Sundays, so she'll let us know Monday or Tuesday or prn.

## 2017-12-01 NOTE — TELEPHONE ENCOUNTER
Patient calling back, [facility] doesn't service [facility], patient will use [facility]. Patient notes that [facility] states that if her doctors office sends rx's that they will put refills on file and wait for her to call when she needs them.   Reiterated that I do not recommend this route that she does run a risk of [facility] filling meds that insurance states are too soon to refill and she may be stuck paying out of pocket for the whole cost of medication.   Patient aware, she would like all of her rx's sent to [facility].   Refills transferred from rx's that were sent in August.     Routing to INR RN - Please advise on new refill for Warfarin.

## 2017-12-11 DIAGNOSIS — Z79.01 LONG-TERM (CURRENT) USE OF ANTICOAGULANTS: Primary | ICD-10-CM

## 2017-12-11 DIAGNOSIS — Z86.73 HISTORY OF CVA (CEREBROVASCULAR ACCIDENT): ICD-10-CM

## 2017-12-12 RX ORDER — WARFARIN SODIUM 2.5 MG/1
TABLET ORAL
Qty: 115 TABLET | Refills: 1 | Status: SHIPPED | OUTPATIENT
Start: 2017-12-12 | End: 2018-02-09

## 2017-12-12 NOTE — TELEPHONE ENCOUNTER
This message was not routed to INR Clinic.  See refill encounter from today, 12/12/17.    Fallon Velazco RN

## 2017-12-12 NOTE — TELEPHONE ENCOUNTER
warfarin (COUMADIN) 2.5 MG tablet    Last Written Prescription Date: 6/12/17  Last Fill Qty: 125, # refills: 1  Last Office Visit with [facility] prescribing provider: 8/10/17       Date and Result of Last PT/INR:   Lab Results   Component Value Date    INR 3.2 11/03/2017    INR 3.9 10/20/2017    INR 1.99 04/15/2005    INR 3.30 04/01/2005          Refilled per nurse protocol standing orders. Prescription sent to [facility] as requested.  Fallon Velazco RN

## 2017-12-15 ENCOUNTER — ANTICOAGULATION THERAPY VISIT (OUTPATIENT)
Dept: NURSING | Facility: CLINIC | Age: 72
End: 2017-12-15
Payer: COMMERCIAL

## 2017-12-15 DIAGNOSIS — Z79.01 LONG-TERM (CURRENT) USE OF ANTICOAGULANTS: ICD-10-CM

## 2017-12-15 DIAGNOSIS — Z86.73 HISTORY OF CVA (CEREBROVASCULAR ACCIDENT): ICD-10-CM

## 2017-12-15 DIAGNOSIS — I63.50 CEREBRAL ARTERY OCCLUSION WITH CEREBRAL INFARCTION (H): ICD-10-CM

## 2017-12-15 LAB — INR POINT OF CARE: 2.3 (ref 0.86–1.14)

## 2017-12-15 PROCEDURE — 99207 ZZC NO CHARGE NURSE ONLY: CPT

## 2017-12-15 PROCEDURE — 85610 PROTHROMBIN TIME: CPT | Mod: QW

## 2017-12-15 PROCEDURE — 36416 COLLJ CAPILLARY BLOOD SPEC: CPT

## 2017-12-15 NOTE — MR AVS SNAPSHOT
Elise Mason   12/15/2017 10:00 AM   Anticoagulation Therapy Visit    Description:  72 year old female   Provider:  [facility]   Department:   Nurse           INR as of 12/15/2017     Today's INR 2.3      Anticoagulation Summary as of 12/15/2017     INR goal 2.0-3.0   Today's INR 2.3   Full instructions 5 mg on Mon, Fri; 2.5 mg all other days   Next INR check 1/26/2018    Indications   Long-term (current) use of anticoagulants [Z79.01]  Cerebral artery occlusion with cerebral infarction (H) [I63.50]  History of CVA (cerebrovascular accident) [Z86.73]         Your next Anticoagulation Clinic appointment(s)     Jan 26, 2018 10:00 AM CST   Anticoagulation Visit with  ANTICOAGULATION CLINIC   [facility] [place] ([facility])    [address]  [address]  [facility] [id number] [phone]              Contact Numbers     [facility]  Please call  [phone] to cancel and/or reschedule your appointment   Please call  [phone] with any problems or questions regarding your therapy.        December 2017 Details    Sun Mon Tue Wed Thu Fri Sat          1               2                 3               4               5               6               7               8               9                 10               11               12               13               14               15      5 mg   See details      16      2.5 mg           17      2.5 mg         18      5 mg         19      2.5 mg         20      2.5 mg         21      2.5 mg         22      5 mg         23      2.5 mg           24      2.5 mg         25      5 mg         26      2.5 mg         27      2.5 mg         28      2.5 mg         29      5 mg         30      2.5 mg           31      2.5 mg                Date Details   12/15 This INR check               How to take your warfarin dose     To take:  2.5 mg Take 1 of the 2.5 mg tablets.    To take:  5 mg Take 2 of the 2.5 mg tablets.            January 2018 Details    Sun Mon Tue Wed Thu Fri Sat      1      5 mg         2      2.5 mg         3      2.5 mg         4      2.5 mg         5      5 mg         6      2.5 mg           7      2.5 mg         8      5 mg         9      2.5 mg         10      2.5 mg         11      2.5 mg         12      5 mg         13      2.5 mg           14      2.5 mg         15      5 mg         16      2.5 mg         17      2.5 mg         18      2.5 mg         19      5 mg         20      2.5 mg           21      2.5 mg         22      5 mg         23      2.5 mg         24      2.5 mg         25      2.5 mg         26            27                 28               29               30               31                   Date Details   No additional details    Date of next INR:  1/26/2018         How to take your warfarin dose     To take:  2.5 mg Take 1 of the 2.5 mg tablets.    To take:  5 mg Take 2 of the 2.5 mg tablets.

## 2017-12-15 NOTE — PROGRESS NOTES
ANTICOAGULATION FOLLOW-UP CLINIC VISIT    Patient Name:  Elise Mason  Date:  12/15/2017  Contact Type:  Face to Face    SUBJECTIVE:     Patient Findings     Positives Inflammation    Comments Left great toe is red, warm, swollen, but not painful.  Onset 1 week ago. Hasn't changed or gotten worse.  She did not want to see a doctor.  Instructed her to watch to make sure it doesn't get worse.  If redness expands or she develops pain, she should see a doctor.           OBJECTIVE    INR Protime   Date Value Ref Range Status   12/15/2017 2.3 (A) 0.86 - 1.14 Final       ASSESSMENT / PLAN  INR assessment THER    Recheck INR In: 6 WEEKS    INR Location Clinic      Anticoagulation Summary as of 12/15/2017     INR goal 2.0-3.0   Today's INR 2.3   Maintenance plan 5 mg (2.5 mg x 2) on Mon, Fri; 2.5 mg (2.5 mg x 1) all other days   Full instructions 5 mg on Mon, Fri; 2.5 mg all other days   Weekly total 22.5 mg   No change documented Fallon Velazco, RN   Plan last modified Fallon Velazco RN (11/3/2017)   Next INR check 1/26/2018   Target end date Indefinite    Indications   Long-term (current) use of anticoagulants [Z79.01]  Cerebral artery occlusion with cerebral infarction (H) [I63.50]  History of CVA (cerebrovascular accident) [Z86.73]         Anticoagulation Episode Summary     [facility] check [facility]    Preferred lab     Send INR reminders to [facility]    Comments 2.5mg tabs - dyllan dose // CALENDAR       Anticoagulation Care Providers     Provider Role Specialty Phone number    Cyn Crow MD  Internal Medicine [phone]            See the Encounter Report to view Anticoagulation Flowsheet and Dosing Calendar (Go to Encounters tab in chart review, and find the Anticoagulation Therapy Visit)        Fallon Velazco RN

## 2018-01-01 ENCOUNTER — ANTICOAGULATION THERAPY VISIT (OUTPATIENT)
Dept: NURSING | Facility: CLINIC | Age: 73
End: 2018-01-01
Payer: COMMERCIAL

## 2018-01-01 DIAGNOSIS — Z79.01 LONG TERM CURRENT USE OF ANTICOAGULANT THERAPY: ICD-10-CM

## 2018-01-01 DIAGNOSIS — Z79.01 LONG TERM CURRENT USE OF ANTICOAGULANT THERAPY: Primary | ICD-10-CM

## 2018-01-01 DIAGNOSIS — N39.0 RECURRENT UTI: Primary | ICD-10-CM

## 2018-01-01 DIAGNOSIS — Z86.73 HISTORY OF CVA (CEREBROVASCULAR ACCIDENT): ICD-10-CM

## 2018-01-01 DIAGNOSIS — I63.50 CEREBRAL ARTERY OCCLUSION WITH CEREBRAL INFARCTION (H): ICD-10-CM

## 2018-01-01 DIAGNOSIS — Z94.0 KIDNEY REPLACED BY TRANSPLANT: ICD-10-CM

## 2018-01-01 LAB
INR POINT OF CARE: 2.6 (ref 0.86–1.14)
INR POINT OF CARE: 2.8 (ref 0.86–1.14)

## 2018-01-01 PROCEDURE — 85610 PROTHROMBIN TIME: CPT | Mod: QW

## 2018-01-01 PROCEDURE — 99207 ZZC NO CHARGE NURSE ONLY: CPT

## 2018-01-01 PROCEDURE — 36416 COLLJ CAPILLARY BLOOD SPEC: CPT

## 2018-01-01 RX ORDER — WARFARIN SODIUM 2.5 MG/1
TABLET ORAL
Qty: 120 TABLET | Refills: 1 | Status: SHIPPED | OUTPATIENT
Start: 2018-01-01 | End: 2019-01-01

## 2018-01-01 RX ORDER — SULFAMETHOXAZOLE AND TRIMETHOPRIM 400; 80 MG/1; MG/1
TABLET ORAL
Qty: 90 TABLET | Refills: 0 | Status: SHIPPED | OUTPATIENT
Start: 2018-01-01 | End: 2019-01-01

## 2018-01-03 ENCOUNTER — TELEPHONE (OUTPATIENT)
Dept: PEDIATRICS | Facility: CLINIC | Age: 73
End: 2018-01-03

## 2018-01-03 DIAGNOSIS — Z94.0 KIDNEY REPLACED BY TRANSPLANT: ICD-10-CM

## 2018-01-03 RX ORDER — AMOXICILLIN 500 MG/1
500 CAPSULE ORAL DAILY PRN
Qty: 4 CAPSULE | Refills: 1 | Status: SHIPPED | OUTPATIENT
Start: 2018-01-03 | End: 2019-01-01

## 2018-01-03 NOTE — TELEPHONE ENCOUNTER
Patient called requesting a refill on amoxicillin for upcoming dental work on 1/9.  Patient is a kidney transplant patient, so this has become the standard prophylactic plan.     Amoxicillin       Last Written Prescription Date: 6/20/17   Last Fill Quantity: 4 capsules,  # refills: 1   Last Office Visit with [facility], P or [facility] prescribing provider: 8/10/17    Digna Tovar RN.  Nurse Triage

## 2018-01-25 ENCOUNTER — ANTICOAGULATION THERAPY VISIT (OUTPATIENT)
Dept: NURSING | Facility: CLINIC | Age: 73
End: 2018-01-25
Payer: COMMERCIAL

## 2018-01-25 ENCOUNTER — HOSPITAL ENCOUNTER (OUTPATIENT)
Facility: CLINIC | Age: 73
Setting detail: SPECIMEN
End: 2018-01-25
Payer: COMMERCIAL

## 2018-01-25 DIAGNOSIS — N18.30 CKD (CHRONIC KIDNEY DISEASE) STAGE 3, GFR 30-59 ML/MIN (H): Primary | ICD-10-CM

## 2018-01-25 DIAGNOSIS — Z94.0 KIDNEY REPLACED BY TRANSPLANT: ICD-10-CM

## 2018-01-25 DIAGNOSIS — I63.50 CEREBRAL ARTERY OCCLUSION WITH CEREBRAL INFARCTION (H): ICD-10-CM

## 2018-01-25 DIAGNOSIS — Z86.73 HISTORY OF CVA (CEREBROVASCULAR ACCIDENT): ICD-10-CM

## 2018-01-25 DIAGNOSIS — Z79.01 LONG-TERM (CURRENT) USE OF ANTICOAGULANTS: ICD-10-CM

## 2018-01-25 LAB
ALBUMIN SERPL-MCNC: 3.4 G/DL (ref 3.4–5)
ALP SERPL-CCNC: 64 U/L (ref 40–150)
ALT SERPL W P-5'-P-CCNC: 21 U/L (ref 0–50)
ANION GAP SERPL CALCULATED.3IONS-SCNC: 8 MMOL/L (ref 3–14)
AST SERPL W P-5'-P-CCNC: 29 U/L (ref 0–45)
BILIRUB SERPL-MCNC: 0.8 MG/DL (ref 0.2–1.3)
BUN SERPL-MCNC: 35 MG/DL (ref 7–30)
CALCIUM SERPL-MCNC: 10.1 MG/DL (ref 8.5–10.1)
CHLORIDE SERPL-SCNC: 105 MMOL/L (ref 94–109)
CO2 SERPL-SCNC: 27 MMOL/L (ref 20–32)
CREAT SERPL-MCNC: 1.8 MG/DL (ref 0.52–1.04)
ERYTHROCYTE [DISTWIDTH] IN BLOOD BY AUTOMATED COUNT: 14.9 % (ref 10–15)
GFR SERPL CREATININE-BSD FRML MDRD: 28 ML/MIN/1.7M2
GLUCOSE SERPL-MCNC: 126 MG/DL (ref 70–99)
HCT VFR BLD AUTO: 43.2 % (ref 35–47)
HGB BLD-MCNC: 14 G/DL (ref 11.7–15.7)
INR POINT OF CARE: 1.5 (ref 0.86–1.14)
MCH RBC QN AUTO: 32 PG (ref 26.5–33)
MCHC RBC AUTO-ENTMCNC: 32.4 G/DL (ref 31.5–36.5)
MCV RBC AUTO: 99 FL (ref 78–100)
PHOSPHATE SERPL-MCNC: 1.5 MG/DL (ref 2.5–4.5)
PLATELET # BLD AUTO: 148 10E9/L (ref 150–450)
POTASSIUM SERPL-SCNC: 3.5 MMOL/L (ref 3.4–5.3)
PROT SERPL-MCNC: 7 G/DL (ref 6.8–8.8)
RBC # BLD AUTO: 4.38 10E12/L (ref 3.8–5.2)
SODIUM SERPL-SCNC: 140 MMOL/L (ref 133–144)
WBC # BLD AUTO: 7.5 10E9/L (ref 4–11)

## 2018-01-25 PROCEDURE — 85610 PROTHROMBIN TIME: CPT | Mod: QW

## 2018-01-25 PROCEDURE — 80158 DRUG ASSAY CYCLOSPORINE: CPT | Performed by: PEDIATRICS

## 2018-01-25 PROCEDURE — 85027 COMPLETE CBC AUTOMATED: CPT | Performed by: PEDIATRICS

## 2018-01-25 PROCEDURE — 99207 ZZC NO CHARGE NURSE ONLY: CPT

## 2018-01-25 PROCEDURE — 36416 COLLJ CAPILLARY BLOOD SPEC: CPT

## 2018-01-25 PROCEDURE — 80053 COMPREHEN METABOLIC PANEL: CPT | Performed by: PEDIATRICS

## 2018-01-25 PROCEDURE — 84100 ASSAY OF PHOSPHORUS: CPT | Performed by: PEDIATRICS

## 2018-01-25 NOTE — MR AVS SNAPSHOT
Elise Mason   1/25/2018 9:45 AM   Anticoagulation Therapy Visit    Description:  72 year old female   Provider:  [facility]   Department:   Nurse           INR as of 1/25/2018     Today's INR 1.5!      Anticoagulation Summary as of 1/25/2018     INR goal 2.0-3.0   Today's INR 1.5!   Full instructions 1/25: 5 mg; Otherwise 5 mg on Mon, Fri; 2.5 mg all other days   Next INR check 2/9/2018    Indications   Long-term (current) use of anticoagulants [Z79.01]  Cerebral artery occlusion with cerebral infarction (H) [I63.50]  History of CVA (cerebrovascular accident) [Z86.73]         Your next Anticoagulation Clinic appointment(s)     Feb 09, 2018 10:00 AM CST   Anticoagulation Visit with  ANTICOAGULATION CLINIC   [facility] ([facility])    [address] [place] [address]  [address]  [facility] [address] [phone]              Contact Numbers     [facility]  Please call  [phone] to cancel and/or reschedule your appointment   Please call  [phone] with any problems or questions regarding your therapy.        January 2018 Details    Sun Mon Tue Wed Thu Fri Sat      1               2               3               4               5               6                 7               8               9               10               11               12               13                 14               15               16               17               18               19               20                 21               22               23               24               25      5 mg   See details      26      5 mg         27      2.5 mg           28      2.5 mg         29      5 mg         30      2.5 mg         31      2.5 mg             Date Details   01/25 This INR check               How to take your warfarin dose     To take:  2.5 mg Take 1 of the 2.5 mg tablets.    To take:  5 mg Take 2 of the 2.5 mg tablets.           February 2018 Details    Sun Mon Tue Wed  Thu Fri Sat         1      2.5 mg         2      5 mg         3      2.5 mg           4      2.5 mg         5      5 mg         6      2.5 mg         7      2.5 mg         8      2.5 mg         9            10                 11               12               13               14               15               16               17                 18               19               20               21               22               23               24                 25               26               27               28                   Date Details   No additional details    Date of next INR:  2/9/2018         How to take your warfarin dose     To take:  2.5 mg Take 1 of the 2.5 mg tablets.    To take:  5 mg Take 2 of the 2.5 mg tablets.

## 2018-01-25 NOTE — TELEPHONE ENCOUNTER
Not due for a refill.     folic acid (FOLVITE) 1 MG tablet was filled on 12/1/2017, qty 180 with 0 refills.

## 2018-01-26 LAB
CYCLOSPORINE BLD LC/MS/MS-MCNC: 89 UG/L (ref 50–400)
TME LAST DOSE: NORMAL H

## 2018-01-29 LAB
TACROLIMUS BLD-MCNC: <3 UG/L (ref 5–15)
TME LAST DOSE: ABNORMAL H

## 2018-01-30 RX ORDER — FOLIC ACID 1 MG/1
TABLET ORAL
Qty: 180 TABLET | Refills: 1 | Status: ON HOLD | OUTPATIENT
Start: 2018-01-30 | End: 2018-02-23

## 2018-02-01 ENCOUNTER — MEDICAL CORRESPONDENCE (OUTPATIENT)
Dept: HEALTH INFORMATION MANAGEMENT | Facility: CLINIC | Age: 73
End: 2018-02-01

## 2018-02-06 DIAGNOSIS — N18.4 CHRONIC KIDNEY DISEASE, STAGE IV (SEVERE) (H): Primary | ICD-10-CM

## 2018-02-06 DIAGNOSIS — Z94.0 KIDNEY REPLACED BY TRANSPLANT: ICD-10-CM

## 2018-02-06 DIAGNOSIS — I10 ESSENTIAL HYPERTENSION, MALIGNANT: ICD-10-CM

## 2018-02-09 ENCOUNTER — ANTICOAGULATION THERAPY VISIT (OUTPATIENT)
Dept: NURSING | Facility: CLINIC | Age: 73
End: 2018-02-09
Payer: COMMERCIAL

## 2018-02-09 DIAGNOSIS — N18.4 CHRONIC KIDNEY DISEASE, STAGE IV (SEVERE) (H): ICD-10-CM

## 2018-02-09 DIAGNOSIS — I63.50 CEREBRAL ARTERY OCCLUSION WITH CEREBRAL INFARCTION (H): ICD-10-CM

## 2018-02-09 DIAGNOSIS — Z94.0 KIDNEY REPLACED BY TRANSPLANT: ICD-10-CM

## 2018-02-09 DIAGNOSIS — I10 ESSENTIAL HYPERTENSION, MALIGNANT: ICD-10-CM

## 2018-02-09 DIAGNOSIS — Z86.73 HISTORY OF CVA (CEREBROVASCULAR ACCIDENT): ICD-10-CM

## 2018-02-09 DIAGNOSIS — Z79.01 LONG-TERM (CURRENT) USE OF ANTICOAGULANTS: Primary | ICD-10-CM

## 2018-02-09 LAB
ALBUMIN SERPL-MCNC: 3.4 G/DL (ref 3.4–5)
ANION GAP SERPL CALCULATED.3IONS-SCNC: 7 MMOL/L (ref 3–14)
BUN SERPL-MCNC: 41 MG/DL (ref 7–30)
CALCIUM SERPL-MCNC: 10.4 MG/DL (ref 8.5–10.1)
CHLORIDE SERPL-SCNC: 106 MMOL/L (ref 94–109)
CO2 SERPL-SCNC: 29 MMOL/L (ref 20–32)
CREAT SERPL-MCNC: 1.92 MG/DL (ref 0.52–1.04)
DEPRECATED CALCIDIOL+CALCIFEROL SERPL-MC: 47 UG/L (ref 20–75)
GFR SERPL CREATININE-BSD FRML MDRD: 26 ML/MIN/1.7M2
GLUCOSE SERPL-MCNC: 139 MG/DL (ref 70–99)
INR POINT OF CARE: 1.8 (ref 0.86–1.14)
PHOSPHATE SERPL-MCNC: 2.5 MG/DL (ref 2.5–4.5)
POTASSIUM SERPL-SCNC: 4.3 MMOL/L (ref 3.4–5.3)
PTH-INTACT SERPL-MCNC: 85 PG/ML (ref 12–72)
SODIUM SERPL-SCNC: 142 MMOL/L (ref 133–144)

## 2018-02-09 PROCEDURE — 83970 ASSAY OF PARATHORMONE: CPT | Performed by: INTERNAL MEDICINE

## 2018-02-09 PROCEDURE — 80069 RENAL FUNCTION PANEL: CPT | Performed by: INTERNAL MEDICINE

## 2018-02-09 PROCEDURE — 85610 PROTHROMBIN TIME: CPT | Mod: QW

## 2018-02-09 PROCEDURE — 82306 VITAMIN D 25 HYDROXY: CPT | Performed by: INTERNAL MEDICINE

## 2018-02-09 PROCEDURE — 99207 ZZC NO CHARGE NURSE ONLY: CPT

## 2018-02-09 PROCEDURE — 36416 COLLJ CAPILLARY BLOOD SPEC: CPT

## 2018-02-09 RX ORDER — WARFARIN SODIUM 2.5 MG/1
TABLET ORAL
Qty: 115 TABLET | Refills: 1
Start: 2018-02-09 | End: 2018-05-21

## 2018-02-09 NOTE — PROGRESS NOTES
ANTICOAGULATION FOLLOW-UP CLINIC VISIT    Patient Name:  Elise Mason  Date:  2/9/2018  Contact Type:  Face to Face    SUBJECTIVE:     Patient Findings     Positives Change in diet/appetite    Comments [facility] in [facility].  Food is prepared - eating more vegetables    Maintenance dose increased.             OBJECTIVE    INR Protime   Date Value Ref Range Status   02/09/2018 1.8 (A) 0.86 - 1.14 Final       ASSESSMENT / PLAN  INR assessment SUB    Recheck INR In: 2 WEEKS    INR Location Clinic      Anticoagulation Summary as of 2/9/2018     INR goal 2.0-3.0   Today's INR 1.8!   Maintenance plan 5 mg (2.5 mg x 2) on Mon, Wed, Fri; 2.5 mg (2.5 mg x 1) all other days   Full instructions 5 mg on Mon, Wed, Fri; 2.5 mg all other days   Weekly total 25 mg   Plan last modified Fallon Velazco, RN (2/9/2018)   Next INR check 2/23/2018   Target end date Indefinite    Indications   Long-term (current) use of anticoagulants [Z79.01]  Cerebral artery occlusion with cerebral infarction (H) [I63.50]  History of CVA (cerebrovascular accident) [Z86.73]         Anticoagulation Episode Summary     IN[facility]    Preferred lab     Send INR reminders to [facility]    Comments 2.5mg tabs - dyllan dose // CALENDAR       Anticoagulation Care Providers     Provider Role Specialty Phone number    Cyn Crow MD  Internal Medicine [phone]            See the Encounter Report to view Anticoagulation Flowsheet and Dosing Calendar (Go to Encounters tab in chart review, and find the Anticoagulation Therapy Visit)        Fallon Velazco RN

## 2018-02-09 NOTE — MR AVS SNAPSHOT
Elise Mason   2/9/2018 10:00 AM   Anticoagulation Therapy Visit    Description:  72 year old female   Provider:  [facility]   Department:  [facility]           INR as of 2/9/2018     Today's INR 1.8!      Anticoagulation Summary as of 2/9/2018     INR goal 2.0-3.0   Today's INR 1.8!   Full instructions 5 mg on Mon, Wed, Fri; 2.5 mg all other days   Next INR check 2/23/2018    Indications   Long-term (current) use of anticoagulants [Z79.01]  Cerebral artery occlusion with cerebral infarction (H) [I63.50]  History of CVA (cerebrovascular accident) [Z86.73]         Your next Anticoagulation Clinic appointment(s)     Feb 23, 2018 10:00 AM CST   Anticoagulation Visit with  ANTICOAGULATION CLINIC   [facility] ([facility])    [address] [place] [address]  [address]  [facility] [address] [phone]              Contact Numbers     [facility]  Please call  [phone] to cancel and/or reschedule your appointment   Please call  [phone] with any problems or questions regarding your therapy.        February 2018 Details    Sun Mon Tue Wed Thu Fri Sat         1               2               3                 4               5               6               7               8               9      5 mg   See details      10      2.5 mg           11      2.5 mg         12      5 mg         13      2.5 mg         14      5 mg         15      2.5 mg         16      5 mg         17      2.5 mg           18      2.5 mg         19      5 mg         20      2.5 mg         21      5 mg         22      2.5 mg         23            24                 25               26               27               28                   Date Details   02/09 This INR check       Date of next INR:  2/23/2018         How to take your warfarin dose     To take:  2.5 mg Take 1 of the 2.5 mg tablets.    To take:  5 mg Take 2 of the 2.5 mg tablets.

## 2018-02-12 ENCOUNTER — TRANSFERRED RECORDS (OUTPATIENT)
Dept: HEALTH INFORMATION MANAGEMENT | Facility: CLINIC | Age: 73
End: 2018-02-12

## 2018-02-14 DIAGNOSIS — Z94.0 KIDNEY REPLACED BY TRANSPLANT: ICD-10-CM

## 2018-02-14 DIAGNOSIS — I10 BENIGN ESSENTIAL HYPERTENSION: ICD-10-CM

## 2018-02-14 NOTE — TELEPHONE ENCOUNTER
"Requested Prescriptions   Pending Prescriptions Disp Refills     felodipine ER (PLENDIL) 2.5 MG 24 hr tablet [Pharmacy Med Name: FELODIPINE ER 2.5 MG TABLET]    Last Written Prescription Date:  12/1/2017  Last Fill Quantity: 90,  # refills: 0   Last office visit: 8/10/2017 with prescribing provider:  Cyn Crow     Future Office Visit:     90 tablet 1     Sig: TAKE 1 TABLET (2.5 MG) BY MOUTH DAILY    Calcium Channel Blockers Protocol  Failed    2/14/2018  1:45 AM       Failed - Normal serum creatinine on file in past 12 months    Recent Labs   Lab Test  02/09/18   1025   CR  1.92*            Passed - Blood pressure under 140/90 in past 12 months    BP Readings from Last 3 Encounters:   08/10/17 126/80   05/09/17 112/64   08/15/16 128/70                Passed - Recent or future visit with authorizing provider    Patient had office visit in the last year or has a visit in the next 30 days with authorizing provider.  See \"Patient Info\" tab in inbasket, or \"Choose Columns\" in Meds & Orders section of the refill encounter.            Passed - Patient is age 18 or older       Passed - No active pregnancy on record       Passed - No positive pregnancy test in past 12 months          "

## 2018-02-15 RX ORDER — FELODIPINE 2.5 MG/1
TABLET, EXTENDED RELEASE ORAL
Qty: 90 TABLET | Refills: 1 | Status: ON HOLD | OUTPATIENT
Start: 2018-02-15 | End: 2018-02-23

## 2018-02-15 NOTE — TELEPHONE ENCOUNTER
Prescription approved per [facility] Refill Protocol.  Madie Jackson, RN  Triage Nurse

## 2018-02-23 ENCOUNTER — ANTICOAGULATION THERAPY VISIT (OUTPATIENT)
Dept: NURSING | Facility: CLINIC | Age: 73
End: 2018-02-23
Payer: COMMERCIAL

## 2018-02-23 ENCOUNTER — RESULTS ONLY (OUTPATIENT)
Dept: ADMINISTRATIVE | Facility: CLINIC | Age: 73
End: 2018-02-23

## 2018-02-23 ENCOUNTER — HOSPITAL ENCOUNTER (OUTPATIENT)
Facility: CLINIC | Age: 73
Setting detail: OBSERVATION
Discharge: HOME | End: 2018-02-24
Attending: EMERGENCY MEDICINE | Admitting: INTERNAL MEDICINE
Payer: COMMERCIAL

## 2018-02-23 DIAGNOSIS — R42 LIGHTHEADEDNESS: ICD-10-CM

## 2018-02-23 DIAGNOSIS — I63.50 CEREBRAL ARTERY OCCLUSION WITH CEREBRAL INFARCTION (H): ICD-10-CM

## 2018-02-23 DIAGNOSIS — R55 SYNCOPE AND COLLAPSE: ICD-10-CM

## 2018-02-23 DIAGNOSIS — Z86.73 HISTORY OF CVA (CEREBROVASCULAR ACCIDENT): ICD-10-CM

## 2018-02-23 DIAGNOSIS — Z79.01 LONG-TERM (CURRENT) USE OF ANTICOAGULANTS: ICD-10-CM

## 2018-02-23 DIAGNOSIS — Z94.0 HISTORY OF RENAL TRANSPLANT: ICD-10-CM

## 2018-02-23 LAB
ANION GAP SERPL CALCULATED.3IONS-SCNC: 9 MMOL/L (ref 3–14)
BASOPHILS # BLD AUTO: 0 10E9/L (ref 0–0.2)
BASOPHILS NFR BLD AUTO: 0.3 %
BUN SERPL-MCNC: 42 MG/DL (ref 7–30)
CALCIUM SERPL-MCNC: 10.8 MG/DL (ref 8.5–10.1)
CHLORIDE SERPL-SCNC: 103 MMOL/L (ref 94–109)
CO2 SERPL-SCNC: 27 MMOL/L (ref 20–32)
CREAT SERPL-MCNC: 2.03 MG/DL (ref 0.52–1.04)
D DIMER PPP FEU-MCNC: <0.3 UG/ML FEU (ref 0–0.5)
DIFFERENTIAL METHOD BLD: NORMAL
EOSINOPHIL # BLD AUTO: 0.1 10E9/L (ref 0–0.7)
EOSINOPHIL NFR BLD AUTO: 1.4 %
ERYTHROCYTE [DISTWIDTH] IN BLOOD BY AUTOMATED COUNT: 13.9 % (ref 10–15)
GFR SERPL CREATININE-BSD FRML MDRD: 24 ML/MIN/1.7M2
GLUCOSE SERPL-MCNC: 109 MG/DL (ref 70–99)
HCT VFR BLD AUTO: 41.2 % (ref 35–47)
HGB BLD-MCNC: 14.1 G/DL (ref 11.7–15.7)
IMM GRANULOCYTES # BLD: 0.1 10E9/L (ref 0–0.4)
IMM GRANULOCYTES NFR BLD: 0.7 %
INR POINT OF CARE: 2.1 (ref 0.86–1.14)
INR PPP: 1.7 (ref 0.86–1.14)
INTERPRETATION ECG - MUSE: NORMAL
LYMPHOCYTES # BLD AUTO: 3.1 10E9/L (ref 0.8–5.3)
LYMPHOCYTES NFR BLD AUTO: 36.3 %
MCH RBC QN AUTO: 31.2 PG (ref 26.5–33)
MCHC RBC AUTO-ENTMCNC: 34.2 G/DL (ref 31.5–36.5)
MCV RBC AUTO: 91 FL (ref 78–100)
MONOCYTES # BLD AUTO: 0.7 10E9/L (ref 0–1.3)
MONOCYTES NFR BLD AUTO: 7.5 %
NEUTROPHILS # BLD AUTO: 4.6 10E9/L (ref 1.6–8.3)
NEUTROPHILS NFR BLD AUTO: 53.8 %
NRBC # BLD AUTO: 0 10*3/UL
NRBC BLD AUTO-RTO: 0 /100
PLATELET # BLD AUTO: 172 10E9/L (ref 150–450)
POTASSIUM SERPL-SCNC: 3.4 MMOL/L (ref 3.4–5.3)
RBC # BLD AUTO: 4.52 10E12/L (ref 3.8–5.2)
SODIUM SERPL-SCNC: 139 MMOL/L (ref 133–144)
TROPONIN I BLD-MCNC: 0.05 UG/L (ref 0–0.1)
WBC # BLD AUTO: 8.6 10E9/L (ref 4–11)

## 2018-02-23 PROCEDURE — 93005 ELECTROCARDIOGRAM TRACING: CPT

## 2018-02-23 PROCEDURE — 80048 BASIC METABOLIC PNL TOTAL CA: CPT | Performed by: EMERGENCY MEDICINE

## 2018-02-23 PROCEDURE — 93005 ELECTROCARDIOGRAM TRACING: CPT | Mod: 76

## 2018-02-23 PROCEDURE — 25000128 H RX IP 250 OP 636: Performed by: PHYSICIAN ASSISTANT

## 2018-02-23 PROCEDURE — 85379 FIBRIN DEGRADATION QUANT: CPT | Performed by: EMERGENCY MEDICINE

## 2018-02-23 PROCEDURE — 25000131 ZZH RX MED GY IP 250 OP 636 PS 637: Performed by: EMERGENCY MEDICINE

## 2018-02-23 PROCEDURE — 99285 EMERGENCY DEPT VISIT HI MDM: CPT | Mod: 25

## 2018-02-23 PROCEDURE — 85025 COMPLETE CBC W/AUTO DIFF WBC: CPT | Performed by: EMERGENCY MEDICINE

## 2018-02-23 PROCEDURE — 25000132 ZZH RX MED GY IP 250 OP 250 PS 637: Performed by: PHYSICIAN ASSISTANT

## 2018-02-23 PROCEDURE — 99207 ZZC NO CHARGE NURSE ONLY: CPT

## 2018-02-23 PROCEDURE — 36416 COLLJ CAPILLARY BLOOD SPEC: CPT

## 2018-02-23 PROCEDURE — G0378 HOSPITAL OBSERVATION PER HR: HCPCS

## 2018-02-23 PROCEDURE — 84484 ASSAY OF TROPONIN QUANT: CPT

## 2018-02-23 PROCEDURE — 85610 PROTHROMBIN TIME: CPT | Mod: QW

## 2018-02-23 PROCEDURE — 99220 ZZC INITIAL OBSERVATION CARE,LEVL III: CPT | Performed by: PHYSICIAN ASSISTANT

## 2018-02-23 PROCEDURE — 85610 PROTHROMBIN TIME: CPT | Performed by: EMERGENCY MEDICINE

## 2018-02-23 RX ORDER — SODIUM CHLORIDE 9 MG/ML
INJECTION, SOLUTION INTRAVENOUS CONTINUOUS
Status: ACTIVE | OUTPATIENT
Start: 2018-02-23 | End: 2018-02-24

## 2018-02-23 RX ORDER — DILTIAZEM HYDROCHLORIDE 120 MG/1
240 CAPSULE, COATED, EXTENDED RELEASE ORAL DAILY
Status: DISCONTINUED | OUTPATIENT
Start: 2018-02-24 | End: 2018-02-24 | Stop reason: HOSPADM

## 2018-02-23 RX ORDER — CYCLOSPORINE 25 MG/1
75 CAPSULE, LIQUID FILLED ORAL ONCE
Status: DISCONTINUED | OUTPATIENT
Start: 2018-02-23 | End: 2018-02-23

## 2018-02-23 RX ORDER — WARFARIN SODIUM 5 MG/1
5 TABLET ORAL ONCE
Status: COMPLETED | OUTPATIENT
Start: 2018-02-23 | End: 2018-02-23

## 2018-02-23 RX ORDER — CYCLOSPORINE 25 MG/1
75 CAPSULE ORAL 2 TIMES DAILY
Status: DISCONTINUED | OUTPATIENT
Start: 2018-02-23 | End: 2018-02-24 | Stop reason: HOSPADM

## 2018-02-23 RX ORDER — LIDOCAINE 40 MG/G
CREAM TOPICAL
Status: DISCONTINUED | OUTPATIENT
Start: 2018-02-23 | End: 2018-02-24 | Stop reason: HOSPADM

## 2018-02-23 RX ORDER — CLONIDINE HYDROCHLORIDE 0.1 MG/1
0.2 TABLET ORAL 2 TIMES DAILY
Status: DISCONTINUED | OUTPATIENT
Start: 2018-02-23 | End: 2018-02-24 | Stop reason: HOSPADM

## 2018-02-23 RX ORDER — NITROGLYCERIN 0.4 MG/1
0.4 TABLET SUBLINGUAL EVERY 5 MIN PRN
Status: DISCONTINUED | OUTPATIENT
Start: 2018-02-23 | End: 2018-02-24 | Stop reason: HOSPADM

## 2018-02-23 RX ORDER — AZATHIOPRINE 50 MG/1
150 TABLET ORAL ONCE
Status: COMPLETED | OUTPATIENT
Start: 2018-02-23 | End: 2018-02-23

## 2018-02-23 RX ORDER — AZATHIOPRINE 50 MG/1
150 TABLET ORAL DAILY
Status: DISCONTINUED | OUTPATIENT
Start: 2018-02-24 | End: 2018-02-24 | Stop reason: HOSPADM

## 2018-02-23 RX ORDER — PROCHLORPERAZINE MALEATE 5 MG
5 TABLET ORAL EVERY 6 HOURS PRN
Status: DISCONTINUED | OUTPATIENT
Start: 2018-02-23 | End: 2018-02-24 | Stop reason: HOSPADM

## 2018-02-23 RX ORDER — ACETAMINOPHEN 650 MG/1
650 SUPPOSITORY RECTAL EVERY 4 HOURS PRN
Status: DISCONTINUED | OUTPATIENT
Start: 2018-02-23 | End: 2018-02-24 | Stop reason: HOSPADM

## 2018-02-23 RX ORDER — PROCHLORPERAZINE 25 MG
12.5 SUPPOSITORY, RECTAL RECTAL EVERY 12 HOURS PRN
Status: DISCONTINUED | OUTPATIENT
Start: 2018-02-23 | End: 2018-02-24 | Stop reason: HOSPADM

## 2018-02-23 RX ORDER — ONDANSETRON 4 MG/1
4 TABLET, ORALLY DISINTEGRATING ORAL EVERY 6 HOURS PRN
Status: DISCONTINUED | OUTPATIENT
Start: 2018-02-23 | End: 2018-02-24 | Stop reason: HOSPADM

## 2018-02-23 RX ORDER — ACETAMINOPHEN 325 MG/1
650 TABLET ORAL EVERY 4 HOURS PRN
Status: DISCONTINUED | OUTPATIENT
Start: 2018-02-23 | End: 2018-02-24 | Stop reason: HOSPADM

## 2018-02-23 RX ORDER — ONDANSETRON 2 MG/ML
4 INJECTION INTRAMUSCULAR; INTRAVENOUS EVERY 6 HOURS PRN
Status: DISCONTINUED | OUTPATIENT
Start: 2018-02-23 | End: 2018-02-24 | Stop reason: HOSPADM

## 2018-02-23 RX ORDER — FELODIPINE 2.5 MG/1
2.5 TABLET, EXTENDED RELEASE ORAL DAILY
Status: DISCONTINUED | OUTPATIENT
Start: 2018-02-24 | End: 2018-02-24 | Stop reason: HOSPADM

## 2018-02-23 RX ORDER — CYCLOSPORINE 25 MG/1
75 CAPSULE ORAL ONCE
Status: DISCONTINUED | OUTPATIENT
Start: 2018-02-23 | End: 2018-02-24 | Stop reason: HOSPADM

## 2018-02-23 RX ADMIN — WARFARIN SODIUM 5 MG: 5 TABLET ORAL at 23:14

## 2018-02-23 RX ADMIN — AZATHIOPRINE 150 MG: 50 TABLET ORAL at 23:14

## 2018-02-23 RX ADMIN — SODIUM CHLORIDE, PRESERVATIVE FREE: 5 INJECTION INTRAVENOUS at 23:58

## 2018-02-23 ASSESSMENT — ENCOUNTER SYMPTOMS
BLOOD IN STOOL: 0
DIARRHEA: 0
NAUSEA: 1
PALPITATIONS: 0
FEVER: 0
LIGHT-HEADEDNESS: 1
CHILLS: 0
SHORTNESS OF BREATH: 0
VOMITING: 0
COUGH: 0

## 2018-02-23 NOTE — IP AVS SNAPSHOT
[facility] Observation Department    [address]    [facility] [address]    Phone:  [phone]                                       After Visit Summary   2/23/2018    Elise Mason    MRN: [id number]           After Visit Summary Signature Page     I have received my discharge instructions, and my questions have been answered. I have discussed any challenges I see with this plan with the nurse or doctor.    ..........................................................................................................................................  Patient/Patient Representative Signature      ..........................................................................................................................................  Patient Representative Print Name and Relationship to Patient    ..................................................               ................................................  Date                                            Time    ..........................................................................................................................................  Reviewed by Signature/Title    ...................................................              ..............................................  Date                                                            Time

## 2018-02-23 NOTE — IP AVS SNAPSHOT
` ` Patient Information     Patient Name Sex     Elise Mason ([id number]) Female 1945       Room Bed    0212 0212-02      Patient Demographics     Address Phone E-mail Address    [address] [address] [address] [phone] (Home)  none (Work)  [phone] (Mobile) [email or web address]      Patient Ethnicity & Race     Ethnic Group Patient Race    American White      Emergency Contact(s)     Name Relation Home Work Mobile    Tonio Mason Spouse [phone]    Delmi Newberry Daughter [phone]        Documents on File        Status Date Received Description       Documents for the Patient    Insurance Card  () 10/10/03     Face Sheet Received () 09     Privacy Notice - [place] Received 12/03/10     Insurance Card  () 04     Insurance Card  () 05     Insurance Card  () 01/10/08     External Medication Information Consent Accepted () 09     Insurance Card  () 09     Face Sheet Received () 02/25/10     External Medication Information Consent Accepted () 11     JAMES Face Sheet Received () 07/14/10     Insurance Card  () 07/14/10     Waiver - Payment  07/14/10     Patient ID Received () 14     Consent for Services - Hospital/Clinic Received () 10/28/10     Consent for Services - Hospital/Clinic Received () 11     CMS IM for Patient Signature       Insurance Card Received () 12     Consent for Services - Hospital/Clinic Received () 12     Advance Directives and Living Will Received 08     External Medication Information Consent Patient Refused () 12     Consent for EHR Access  13 Copied from existing Consent for services - C/HOD collected on 2012    [facility] Specified Other       Consent for Services - Hospital/Clinic Received () 13     External Medication  Information Consent Accepted 12/03/15     Insurance Card Received () 14 [employer]    Consent for Services - Hospital/Clinic Received () 14     Consent for Services - Hospital/Clinic Received () 06/18/15     Insurance Card Received () 08/03/15     Patient ID Received 08/03/15 [place] 18    Business/Insurance/Care Coordination/Health Form - Patient  10/26/15 ATTENDING PHYS STATEMENT 10/14/2015    Consent for Services/Privacy Notice - Hospital/Clinic Received () 16     Insurance Card Received () 16 [employer]     Consent for Services/Privacy Notice - Hospital/Clinic Received () 17     Insurance Card Received 17 [employer] 2017    [facility] MARIO Authorization  17     Care Everywhere Prospective Auth Received 10/20/17     Consent to Communicate  18 AUTHORIZATION TO DISCUSS PROTECTED HEALTH INFORMATION 17    Consent for Services/Privacy Notice - Hospital/Clinic Received 18     Patient ID  (Deleted)      Patient Photo   Photo of Patient       Documents for the Encounter    CMS IM for Patient Signature       Observation Notice Received 18 RN RCarlaM      Admission Information     Attending Provider Admitting Provider Admission Type Admission Date/Time    Conchita Madrigal, DO Conchita Madrigal, DO Emergency 18  1810    Discharge Date Hospital Service Auth/Cert Status Service Area     Observation Incomplete [facility] SERVICES    Unit Room/Bed Admission Status        OBSERVATION DEPT 0212/0212-02 Admission (Confirmed)       Admission     Complaint    Syncope      Hospital Account     Name Acct ID Class Status Primary Coverage    Elise Mason [id number] Observation Open [employer] - [employer]            Guarantor Account (for Hospital Account #[id number])     Name Relation to Pt Service Area Active? Acct Type    Elise Mason  FCS Yes Personal/Family    Address Phone          [address]  [facility]  [address]  [place] [address] [phone](H)  none(O)              Coverage Information (for Hospital Account #[id number])     F/O Payor/Plan Precert #    UCARE/[employer]ARE FOR SENIORS     Subscriber Subscriber #    Elise Mason [id number]    Address Phone    [address]  [place] [address] [phone]

## 2018-02-23 NOTE — MR AVS SNAPSHOT
Elise Mason   2/23/2018 10:00 AM   Anticoagulation Therapy Visit    Description:  72 year old female   Provider:  [facility]   Department:   Nurse           INR as of 2/23/2018     Today's INR 2.1      Anticoagulation Summary as of 2/23/2018     INR goal 2.0-3.0   Today's INR 2.1   Full instructions 5 mg on Mon, Wed, Fri; 2.5 mg all other days   Next INR check 4/6/2018    Indications   Long-term (current) use of anticoagulants [Z79.01]  Cerebral artery occlusion with cerebral infarction (H) [I63.50]  History of CVA (cerebrovascular accident) [Z86.73]         Your next Anticoagulation Clinic appointment(s)     Apr 06, 2018 10:00 AM CDT   Anticoagulation Visit with  ANTICOAGULATION CLINIC   [facility] ([facility])    [address] [place] [address]  [address]  [facility] [address] [phone]              Contact Numbers     [facility]  Please call  [phone] to cancel and/or reschedule your appointment   Please call  [phone] with any problems or questions regarding your therapy.        February 2018 Details    Sun Mon Tue Wed Thu Fri Sat         1               2               3                 4               5               6               7               8               9               10                 11               12               13               14               15               16               17                 18               19               20               21               22               23      5 mg   See details      24      2.5 mg           25      2.5 mg         26      5 mg         27      2.5 mg         28      5 mg             Date Details   02/23 This INR check               How to take your warfarin dose     To take:  2.5 mg Take 1 of the 2.5 mg tablets.    To take:  5 mg Take 2 of the 2.5 mg tablets.           March 2018 Details    Sun Mon Tue Wed Thu Fri Sat         1      2.5 mg         2      5 mg         3      2.5  mg           4      2.5 mg         5      5 mg         6      2.5 mg         7      5 mg         8      2.5 mg         9      5 mg         10      2.5 mg           11      2.5 mg         12      5 mg         13      2.5 mg         14      5 mg         15      2.5 mg         16      5 mg         17      2.5 mg           18      2.5 mg         19      5 mg         20      2.5 mg         21      5 mg         22      2.5 mg         23      5 mg         24      2.5 mg           25      2.5 mg         26      5 mg         27      2.5 mg         28      5 mg         29      2.5 mg         30      5 mg         31      2.5 mg          Date Details   No additional details            How to take your warfarin dose     To take:  2.5 mg Take 1 of the 2.5 mg tablets.    To take:  5 mg Take 2 of the 2.5 mg tablets.           April 2018 Details    Sun Mon Tue Wed Thu Fri Sat     1      2.5 mg         2      5 mg         3      2.5 mg         4      5 mg         5      2.5 mg         6            7                 8               9               10               11               12               13               14                 15               16               17               18               19               20               21                 22               23               24               25               26               27               28                 29               30                     Date Details   No additional details    Date of next INR:  4/6/2018         How to take your warfarin dose     To take:  2.5 mg Take 1 of the 2.5 mg tablets.    To take:  5 mg Take 2 of the 2.5 mg tablets.

## 2018-02-23 NOTE — IP AVS SNAPSHOT
MRN:[id number]                      After Visit Summary   2/23/2018    Elise Mason    MRN: [id number]           Thank you!     Thank you for choosing [facility] for your care. Our goal is always to provide you with excellent care. Hearing back from our patients is one way we can continue to improve our services. Please take a few minutes to complete the written survey that you may receive in the mail after you visit. If you would like to speak to someone directly about your visit please contact Patient Relations at [phone]. Thank you!          Patient Information     Date Of Birth          1945        About your hospital stay     You were admitted on:  February 23, 2018 You last received care in the:  [facility] Observation Department    You were discharged on:  February 24, 2018        Reason for your hospital stay       You were registered to the observation unit for syncope (passing out). Your labs were obtained and showed very mild dehydration, but were otherwise normal. You were monitored overnight and your telemetry showed a 1st degree AVB and PVCs, otherwise unremarkable.   You had an ECHO as well, which was normal.   You will be discharged with an order for an event monitor as well, this is a continuous EKG that you can wear at home.                  Who to Call     For medical emergencies, please call 911.  For non-urgent questions about your medical care, please call your primary care provider or clinic, [phone]          Attending Provider     Provider Specialty    Andrea Reyes MD Emergency Medicine    Conchita Madrigal DO Internal Medicine       Primary Care Provider Office Phone # Fax #    Cyn Crow [phone]      After Care Instructions     Activity       Your activity upon discharge: activity as tolerated and no driving for today            Diet       Follow this diet upon discharge:  "resume previous diet                  Follow-up Appointments     Follow-up and recommended labs and tests        1. Please call to set up the heart monitor. [phone].  2. Drink plenty of fluids.   3. Follow up with your family doctor in 1 week for recheck.   4. Return to the ER if anything worsens or changes.                  Your next 10 appointments already scheduled     Apr 06, 2018 10:00 AM CDT   Anticoagulation Visit with  ANTICOAGULATION CLINIC   [facility] ([facility])    [address] [place] [address]  [address]  [facility] [address] [phone]                         Future tests that were ordered for you     Zio Patch Holter                 Pending Results     Date and Time Order Name Status Description    2/23/2018 1827 EKG 12-lead, tracing only Preliminary             Statement of Approval     Ordered          02/24/18 1025  I have reviewed and agree with all the recommendations and orders detailed in this document.  EFFECTIVE NOW     Approved and electronically signed by:  Alecia Aguilar PA-C             Admission Information     Date & Time Provider Department Dept. Phone    2/23/2018 Conchita Madrigal DO [facility] Observation Department [phone]      Your Vitals Were     Blood Pressure Temperature Respirations Height Weight Pulse Oximetry    167/76 (BP Location: Left arm) 97.7  F (36.5  C) (Oral) 20 1.626 m (5' 4\") 110.9 kg (244 lb 8 oz) 91%    BMI (Body Mass Index)                   41.97 kg/m2           MyChart Information     [facility] gives you secure access to your electronic health record. If you see a primary care provider, you can also send messages to your care team and make appointments. If you have questions, please call your primary care clinic.  If you do not have a primary care provider, please call [phone] and they will assist you.        Care EveryWhere ID     This is your Care EveryWhere ID. This could be used " by other organizations to access your [facility] medical records  [id number]        Equal Access to Services     MAGDA LIRIANO : Jennifer Hernandez, francis charles, freedom jama, bibi yee. So [facility] [phone].    ATENCIÓN: Si habla español, tiene a mehta disposición servicios gratuitos de asistencia lingüística. Llame al [phone].    We comply with applicable federal civil rights laws and [place] laws. We do not discriminate on the basis of race, color, national origin, age, disability, sex, sexual orientation, or gender identity.               Review of your medicines      CONTINUE these medicines which have NOT CHANGED        Dose / Directions    * ACE NOT PRESCRIBED (INTENTIONAL)   Used for:  Polycystic kidney, unspecified type, Kidney replaced by transplant        by Other route continuous prn.   Refills:  0       albuterol 108 (90 BASE) MCG/ACT Inhaler   Commonly known as:  PROAIR HFA/PROVENTIL HFA/VENTOLIN HFA   Used for:  Dyspnea on exertion        Dose:  2 puff   Inhale 2 puffs into the lungs every 4 hours as needed for shortness of breath / dyspnea or wheezing   Quantity:  1 Inhaler   Refills:  0       alendronate 70 MG tablet   Commonly known as:  FOSAMAX   Used for:  Age related osteoporosis, unspecified pathological fracture presence        Dose:  70 mg   Take 1 tablet (70 mg) by mouth once a week   Quantity:  12 tablet   Refills:  2       amoxicillin 500 MG capsule   Commonly known as:  AMOXIL   Used for:  Kidney replaced by transplant        Dose:  500 mg   Take 1 capsule (500 mg) by mouth daily as needed   Quantity:  4 capsule   Refills:  1       * ARB NOT PRESCRIBED (INTENTIONAL)   Used for:  Polycystic kidney, unspecified type, Kidney replaced by transplant        by Other route continuous prn.   Refills:  0       ASPIRIN NOT PRESCRIBED   Commonly known as:  INTENTIONAL   Used for:  History of CVA (cerebrovascular accident)        Dose:  1  each   1 each continuous prn for other Antiplatelet medication not prescribed intentionally due to Current anticoagulant therapy (warfarin/enoxaparin)   Quantity:  0 each   Refills:  0       azaTHIOprine 50 MG tablet   Commonly known as:  IMURAN        Take three tablets by mouth daily   Quantity:  90   Refills:  11       betamethasone dipropionate 0.05 % cream   Commonly known as:  DIPROSONE   Used for:  Dermatitis        Apply sparingly to affected area twice daily as needed.  Do not apply to face.   Quantity:  45 g   Refills:  4       calcitRIOL 0.5 MCG capsule   Commonly known as:  ROCALTROL   Used for:  Kidney replaced by transplant        Dose:  0.5 mcg   Take 1 capsule (0.5 mcg) by mouth daily   Quantity:  90 capsule   Refills:  2       cloNIDine 0.2 MG tablet   Commonly known as:  CATAPRES   Used for:  Kidney replaced by transplant, Benign essential hypertension        Dose:  0.2 mg   Take 1 tablet (0.2 mg) by mouth 2 times daily   Quantity:  180 tablet   Refills:  2       cyanocolbalamin 500 MCG tablet   Commonly known as:  vitamin  B-12        OTC    1 tab daily per transplant clinic   Refills:  0       cycloSPORINE modified 25 MG capsule   Used for:  Kidney replaced by transplant        Dose:  75 mg   Take 3 capsules by mouth 2 times daily.   Quantity:  540 capsule   Refills:  11       diltiazem 240 MG 24 hr ER beaded capsule   Commonly known as:  TIAZAC   Used for:  Kidney replaced by transplant, Benign essential hypertension        Dose:  240 mg   Take 1 capsule (240 mg) by mouth daily   Quantity:  90 capsule   Refills:  2       felodipine ER 2.5 MG 24 hr tablet   Commonly known as:  PLENDIL   Used for:  Kidney replaced by transplant, Benign essential hypertension        TAKE 1 TABLET (2.5 MG) BY MOUTH DAILY   Quantity:  90 tablet   Refills:  0       ferrous sulfate 325 (65 FE) MG tablet   Commonly known as:  IRON   Used for:  Kidney replaced by transplant        Dose:  1 tablet   Take 1 tablet by mouth  daily.   Quantity:  90 tablet   Refills:  1       folic acid 1 MG tablet   Commonly known as:  FOLVITE   Used for:  Kidney replaced by transplant        TAKE 2 TABLETS (2,000 MCG) BY MOUTH DAILY   Quantity:  180 tablet   Refills:  0       furosemide 80 MG tablet   Commonly known as:  LASIX   Used for:  Kidney replaced by transplant        Dose:  80 mg   Take 1 tablet (80 mg) by mouth 2 times daily   Quantity:  180 tablet   Refills:  2       GLUCOSAMINE SULFATE PO   Indication:  one pill once daily        Dose:  750 mL   Take 750 mLs by mouth daily   Refills:  0       OMEGA 3 550 MG Caps   Generic drug:  LINOLENIC ACID        Dose:  2000 mg   Take 2,000 mg by mouth 2 times daily.   Refills:  0       * order for DME   Used for:  Kidney replaced by transplant, Polycystic kidney, unspecified type, Edema        Please dispense 2 pair of knee high compression 20-30 stockings   Quantity:  2 each   Refills:  0       pyridoxine 100 MG tablet   Commonly known as:  VITAMIN B-6        OTC   2 tabs daily per transplant clinic   Refills:  0       simvastatin 20 MG tablet   Commonly known as:  ZOCOR   Used for:  Pure hypercholesterolemia        Dose:  20 mg   Take 1 tablet (20 mg) by mouth At Bedtime   Quantity:  90 tablet   Refills:  2       sulfamethoxazole-trimethoprim 400-80 MG per tablet   Commonly known as:  BACTRIM   Used for:  Kidney replaced by transplant        Dose:  1 tablet   Take 1 tablet by mouth daily   Quantity:  90 tablet   Refills:  2       TUMS PO        Dose:  2 tablet   Take 2 tablets by mouth 3 times daily.   Refills:  0       warfarin 2.5 MG tablet   Commonly known as:  COUMADIN   Used for:  Long-term (current) use of anticoagulants, History of CVA (cerebrovascular accident)        Take 5 mg on Mon, Wed, Fri; 2.5 mg all other days or as directed by INR clinic   Quantity:  115 tablet   Refills:  1       * Notice:  This list has 3 medication(s) that are the same as other medications prescribed for you. Read  the directions carefully, and ask your doctor or other care provider to review them with you.             Protect others around you: Learn how to safely use, store and throw away your medicines at www.disposemymeds.org.             Medication List: This is a list of all your medications and when to take them. Check marks below indicate your daily home schedule. Keep this list as a reference.      Medications           Morning Afternoon Evening Bedtime As Needed    * ACE NOT PRESCRIBED (INTENTIONAL)   by Other route continuous prn.                                albuterol 108 (90 BASE) MCG/ACT Inhaler   Commonly known as:  PROAIR HFA/PROVENTIL HFA/VENTOLIN HFA   Inhale 2 puffs into the lungs every 4 hours as needed for shortness of breath / dyspnea or wheezing                                alendronate 70 MG tablet   Commonly known as:  FOSAMAX   Take 1 tablet (70 mg) by mouth once a week                                amoxicillin 500 MG capsule   Commonly known as:  AMOXIL   Take 1 capsule (500 mg) by mouth daily as needed                                * ARB NOT PRESCRIBED (INTENTIONAL)   by Other route continuous prn.                                ASPIRIN NOT PRESCRIBED   Commonly known as:  INTENTIONAL   1 each continuous prn for other Antiplatelet medication not prescribed intentionally due to Current anticoagulant therapy (warfarin/enoxaparin)                                azaTHIOprine 50 MG tablet   Commonly known as:  IMURAN   Take three tablets by mouth daily   Last time this was given:  150 mg on 2/23/2018 11:14 PM                                betamethasone dipropionate 0.05 % cream   Commonly known as:  DIPROSONE   Apply sparingly to affected area twice daily as needed.  Do not apply to face.                                calcitRIOL 0.5 MCG capsule   Commonly known as:  ROCALTROL   Take 1 capsule (0.5 mcg) by mouth daily                                cloNIDine 0.2 MG tablet   Commonly known as:   CATAPRES   Take 1 tablet (0.2 mg) by mouth 2 times daily   Last time this was given:  0.2 mg on 2/24/2018  9:08 AM                                cyanocolbalamin 500 MCG tablet   Commonly known as:  vitamin  B-12   OTC    1 tab daily per transplant clinic                                cycloSPORINE modified 25 MG capsule   Take 3 capsules by mouth 2 times daily.   Last time this was given:  75 mg on 2/24/2018  9:23 AM                                diltiazem 240 MG 24 hr ER beaded capsule   Commonly known as:  TIAZAC   Take 1 capsule (240 mg) by mouth daily                                felodipine ER 2.5 MG 24 hr tablet   Commonly known as:  PLENDIL   TAKE 1 TABLET (2.5 MG) BY MOUTH DAILY                                ferrous sulfate 325 (65 FE) MG tablet   Commonly known as:  IRON   Take 1 tablet by mouth daily.                                folic acid 1 MG tablet   Commonly known as:  FOLVITE   TAKE 2 TABLETS (2,000 MCG) BY MOUTH DAILY                                furosemide 80 MG tablet   Commonly known as:  LASIX   Take 1 tablet (80 mg) by mouth 2 times daily                                GLUCOSAMINE SULFATE PO   Take 750 mLs by mouth daily                                OMEGA 3 550 MG Caps   Take 2,000 mg by mouth 2 times daily.   Generic drug:  LINOLENIC ACID                                * order for DME   Please dispense 2 pair of knee high compression 20-30 stockings                                pyridoxine 100 MG tablet   Commonly known as:  VITAMIN B-6   OTC   2 tabs daily per transplant clinic                                simvastatin 20 MG tablet   Commonly known as:  ZOCOR   Take 1 tablet (20 mg) by mouth At Bedtime                                sulfamethoxazole-trimethoprim 400-80 MG per tablet   Commonly known as:  BACTRIM   Take 1 tablet by mouth daily                                TUMS PO   Take 2 tablets by mouth 3 times daily.                                warfarin 2.5 MG tablet    Commonly known as:  COUMADIN   Take 5 mg on Mon, Wed, Fri; 2.5 mg all other days or as directed by INR clinic   Last time this was given:  5 mg on 2/23/2018 11:14 PM                                * Notice:  This list has 3 medication(s) that are the same as other medications prescribed for you. Read the directions carefully, and ask your doctor or other care provider to review them with you.

## 2018-02-23 NOTE — PROGRESS NOTES
ANTICOAGULATION FOLLOW-UP CLINIC VISIT    Patient Name:  Elise Mason  Date:  2/23/2018  Contact Type:  Face to Face    SUBJECTIVE:     Patient Findings     Positives No Problem Findings           OBJECTIVE    INR Protime   Date Value Ref Range Status   02/23/2018 2.1 (A) 0.86 - 1.14 Final       ASSESSMENT / PLAN  INR assessment THER    Recheck INR In: 6 WEEKS    INR Location Clinic      Anticoagulation Summary as of 2/23/2018     INR goal 2.0-3.0   Today's INR 2.1   Maintenance plan 5 mg (2.5 mg x 2) on Mon, Wed, Fri; 2.5 mg (2.5 mg x 1) all other days   Full instructions 5 mg on Mon, Wed, Fri; 2.5 mg all other days   Weekly total 25 mg   No change documented Fallon Velazco RN   Plan last modified Fallon Velazco RN (2/9/2018)   Next INR check 4/6/2018   Target end date Indefinite    Indications   Long-term (current) use of anticoagulants [Z79.01]  Cerebral artery occlusion with cerebral infarction (H) [I63.50]  History of CVA (cerebrovascular accident) [Z86.73]         Anticoagulation Episode Summary     INR check location [facility]    Preferred lab     Send INR reminders to [facility]    Comments 2.5mg tabs - dyllan dose // CALENDAR       Anticoagulation Care Providers     Provider Role Specialty Phone number    Cyn Crow MD  Internal Medicine [phone]            See the Encounter Report to view Anticoagulation Flowsheet and Dosing Calendar (Go to Encounters tab in chart review, and find the Anticoagulation Therapy Visit)        Fallon Velazco RN

## 2018-02-24 ENCOUNTER — APPOINTMENT (OUTPATIENT)
Dept: CARDIOLOGY | Facility: CLINIC | Age: 73
End: 2018-02-24
Attending: PHYSICIAN ASSISTANT
Payer: COMMERCIAL

## 2018-02-24 VITALS
WEIGHT: 244.5 LBS | DIASTOLIC BLOOD PRESSURE: 76 MMHG | TEMPERATURE: 97.7 F | RESPIRATION RATE: 20 BRPM | OXYGEN SATURATION: 91 % | HEIGHT: 64 IN | SYSTOLIC BLOOD PRESSURE: 167 MMHG | BODY MASS INDEX: 41.74 KG/M2

## 2018-02-24 LAB
ANION GAP SERPL CALCULATED.3IONS-SCNC: 6 MMOL/L (ref 3–14)
BUN SERPL-MCNC: 33 MG/DL (ref 7–30)
CALCIUM SERPL-MCNC: 9.4 MG/DL (ref 8.5–10.1)
CHLORIDE SERPL-SCNC: 105 MMOL/L (ref 94–109)
CO2 SERPL-SCNC: 30 MMOL/L (ref 20–32)
CREAT SERPL-MCNC: 1.67 MG/DL (ref 0.52–1.04)
ERYTHROCYTE [DISTWIDTH] IN BLOOD BY AUTOMATED COUNT: 14.1 % (ref 10–15)
GFR SERPL CREATININE-BSD FRML MDRD: 30 ML/MIN/1.7M2
GLUCOSE SERPL-MCNC: 105 MG/DL (ref 70–99)
HCT VFR BLD AUTO: 39.7 % (ref 35–47)
HGB BLD-MCNC: 13.2 G/DL (ref 11.7–15.7)
INR PPP: 1.77 (ref 0.86–1.14)
INTERPRETATION ECG - MUSE: NORMAL
INTERPRETATION ECG - MUSE: NORMAL
MCH RBC QN AUTO: 30.9 PG (ref 26.5–33)
MCHC RBC AUTO-ENTMCNC: 33.2 G/DL (ref 31.5–36.5)
MCV RBC AUTO: 93 FL (ref 78–100)
PLATELET # BLD AUTO: 144 10E9/L (ref 150–450)
POTASSIUM SERPL-SCNC: 3.4 MMOL/L (ref 3.4–5.3)
RBC # BLD AUTO: 4.27 10E12/L (ref 3.8–5.2)
SODIUM SERPL-SCNC: 141 MMOL/L (ref 133–144)
WBC # BLD AUTO: 6.2 10E9/L (ref 4–11)

## 2018-02-24 PROCEDURE — 85610 PROTHROMBIN TIME: CPT | Performed by: PHYSICIAN ASSISTANT

## 2018-02-24 PROCEDURE — 85027 COMPLETE CBC AUTOMATED: CPT | Performed by: PHYSICIAN ASSISTANT

## 2018-02-24 PROCEDURE — 25000131 ZZH RX MED GY IP 250 OP 636 PS 637: Performed by: PHYSICIAN ASSISTANT

## 2018-02-24 PROCEDURE — 36415 COLL VENOUS BLD VENIPUNCTURE: CPT | Performed by: PHYSICIAN ASSISTANT

## 2018-02-24 PROCEDURE — 80048 BASIC METABOLIC PNL TOTAL CA: CPT | Performed by: PHYSICIAN ASSISTANT

## 2018-02-24 PROCEDURE — 99217 ZZC OBSERVATION CARE DISCHARGE: CPT | Performed by: PHYSICIAN ASSISTANT

## 2018-02-24 PROCEDURE — G0378 HOSPITAL OBSERVATION PER HR: HCPCS

## 2018-02-24 PROCEDURE — 93306 TTE W/DOPPLER COMPLETE: CPT | Mod: 26 | Performed by: INTERNAL MEDICINE

## 2018-02-24 PROCEDURE — 25000132 ZZH RX MED GY IP 250 OP 250 PS 637: Performed by: PHYSICIAN ASSISTANT

## 2018-02-24 PROCEDURE — 93306 TTE W/DOPPLER COMPLETE: CPT

## 2018-02-24 RX ADMIN — CLONIDINE HYDROCHLORIDE 0.2 MG: 0.1 TABLET ORAL at 09:08

## 2018-02-24 RX ADMIN — DILTIAZEM HYDROCHLORIDE 240 MG: 120 CAPSULE, COATED, EXTENDED RELEASE ORAL at 09:08

## 2018-02-24 RX ADMIN — CYCLOSPORINE 75 MG: 25 CAPSULE ORAL at 09:23

## 2018-02-24 RX ADMIN — CLONIDINE HYDROCHLORIDE 0.2 MG: 0.1 TABLET ORAL at 00:00

## 2018-02-24 RX ADMIN — CYCLOSPORINE 75 MG: 25 CAPSULE ORAL at 00:01

## 2018-02-24 NOTE — PHARMACY - DISCHARGE MEDICATION RECONCILIATION
Clinical Pharmacy- Warfarin Discharge Note    Patient discharging on prior to admission warfarin dose.    Anticoagulation Dose History     Recent Dosing and Labs Latest Ref Rng & Units 10/20/2017 11/3/2017 12/15/2017 1/25/2018 2/9/2018 2/23/2018 2/24/2018    Warfarin 5 mg - - - - - - 5 mg -    INR 0.86 - 1.14 - - - - - 1.70(H) 1.77(H)    INR 0.86 - 1.14 3.9(A) 3.2(A) 2.3(A) 1.5(A) 1.8(A) 2.1(A) -

## 2018-02-24 NOTE — PLAN OF CARE
Problem: Patient Care Overview  Goal: Discharge Needs Assessment  Outcome: No Change  ROOM # 212-2    Living Situation (if not independent, order SW consult): [facility] independent [facility] with   Facility name: [facility]  :     Activity level at baseline: Independent  Activity level on admit: Assist of 1       Patient registered to observation; given Patient Bill of Rights; given the opportunity to ask questions about observation status and their plan of care.  Patient has been oriented to the observation room, bathroom and call light is in place.    Discussed discharge goals and expectations with patient/family.

## 2018-02-24 NOTE — H&P
[facility] / Observation Unit  History and Physical Exam     Elise Mason MRN# [id number]   YOB: 1945 Age: 72 year old      Date of Admission: 2/23/2018    Primary care provider: Cyn Crow          Assessment   Elise Mason is a 72 year old female with a PMH significant for polycystic kidney disease s/p kidney transplant (1995), CKD, HTN, h/o CVA on Warfarin, GERD, anemia, h/o lumbar compression fractures, and cervical cancer s/p hysterectomy, who presented to the Emergency Room with an episode of syncope.     Work up in the ED reveals: hypertensive otherwise VSS, BUN of 42, Cr of 2.03, calcium of 10.8, CBC unremarkable, INR of 1.70, negative d-dimer, and EKG shows rate of 83 bpm in sinus rhythm with 1st degree AV block with occasional PVCs, left axis deviation, incomplete RBBB, LVH with repolarization abnormality at 18:17 with repeat showing rate of 87 bpm in sinus rhythm with 1st degree AV block, left axis deviation, incomplete RBBB, and prolonged QT.      Patient will be registered to Observation for further work-up and evaluation.     1. Syncope: suspect vasovagal but without prodrome may be related to underlying arrhythmia. Patient was lightheaded after the episode. As well the patient reported being confused after returning to consciousness but no seizure activity was noted by the patient's  and no prior h/o seizures, thus low suspicion for this being the cause. Will monitor on telemetry, obtain echocardiogram, check orthostatic blood pressures, and if workup is unremarkable the patient should be able to discharge to have cardiac event monitor placed as an outpatient.     2. Acute on chronic renal disease: baseline Cr of ~1.8 with Cr of 2.03 today, suspect this is hypovolemic and could be contributing to #1.   - IVF hydration overnight  - Avoid nephrotoxic agents  - Recheck BMP in AM     3. HTN: moderately elevated, continue PTA Felodipine, Diltiazem, and  Clonidine    4. H/o CVA: diagnosed in 1995 and 1996 with residual anomic aphasia. Anticoagulated with Warfarin. INR subtherapeutic today at 1.70.  - Pharmacy consulted for Warfarin dosing     5. Polycystic kidney disease s/p kidney transplant: patient has both of her native kidneys, follows with Dr. Isidro of Nephrology  - Continue PTA Cyclosporine and Azathioprine     6. Chronic LE edema: managed with Lasix, will hold due to #2         Plan     1. Registered to Observation  2. Continue telemetry monitoring  3. Orthostatic vitals on admission (if not performed already).   4. IV hydration with Normal saline, @ at 75 ml / hr  5. Obtain ECHO to evaluate for valvular dysfunction and assess for LV function  6. Consider physical therapy for gait and mobility assessment in AM    DVT prophylaxis: pt at low risk, encourage ambulation   Code: Full, discussed with patient   Dispo: likely discharge within 24-48 hours                 Chief Complaint:   Near syncopal/syncopal episode         History of Present Illness:   Elise Mason  is a 72 year old female who complains of arriving to the dining room around 17:30 for dinner at [facility] where she lives independently with her  when the patient's  witnessed the patient suddenly passed out. The patient supposedly started to fall forward and to the side while sitting when her  caught her before she hit her head. The patient was unconscious for about 90 seconds. Upon returning to consciousness the patient reports feeling confused with the situation. The patient's  denies any seizure like activity, slurred speech, or facial droop prior to the event. Patient denies any lightheadedness or dizziness, vision changes, chest pain, diaphoresis, or shortness of breath prior to the event. The patient notes feeling intermittently dizzy when walking down to the dining room but this usually resolves itself with sitting. As well the patient notes  intermittent mid sternal chest pain when walking without shortness of breath over the last few months since starting the use of an albuterol inhaler. The patient was started on this due to shortness of breath with activity and based on her PFTs she improved with use of albuterol. The patient reports her exercise tolerance has increased with using the albuterol inhaler. Patient denies h/o CAD. Denies numbness/tingling, F/C, N/V/D, urinary symptoms, abdominal pain, weakness, or recent illness. No h/o syncope or arrhthymias.    Modifying factors:rest  Associated cardiac symptoms:lightheadedness after returning to consciousness   Associated neurologic symptoms: no TIA or stroke-like symptoms          Past Medical History:     Past Medical History:   Diagnosis Date     Anemia, unspecified      Cervical cancer (H) 1972    Hysterectomy, still has ovaries     Compression fracture of lumbar vertebra (H) July 1996    L1 and L4? Happened while walking down stairs. Didn't fall.     CVA (cerebral infarction) Nov 1995 & Nov 1996    on coumadin     Esophageal reflux      Long-term (current) use of anticoagulants      Nonspecific abnormal unspecified cardiovascular function study      Osteoporosis, unspecified      Other specified disorder resulting from impaired renal function      Polycystic kidney, unspecified type      Unspecified essential hypertension           Past Surgical History:     Past Surgical History:   Procedure Laterality Date     BIOPSY BREAST  1976 or 1977    left breast? No lump, bleeding from nipple     HYSTERECTOMY, PAP NO LONGER INDICATED  1972 or 1973    Hysterectomy due to cervical cancer     JOINT REPLACEMENT, HIP RT/LT      Left Hip Replacement     TRANSPLANT KIDNEY RECIPIENT LIVING RELATED  9/27/1995    Right side, both original kidneys left in place          Social History:     Social History     Social History     Marital status:      Spouse name: N/A     Number of children: 2     Years of  education: N/A     Occupational History     disability      was  for [employer]      Retired     Social History Main Topics     Smoking status: Former Smoker     Quit date: 1/1/1990     Smokeless tobacco: Never Used     Alcohol use Yes      Comment: 2-3x per year     Drug use: No     Sexual activity: Yes     Partners: Male     Other Topics Concern     Parent/Sibling W/ Cabg, Mi Or Angioplasty Before 65f 55m? Yes     Social History Narrative          Family History:     Family History   Problem Relation Age of Onset     Arthritis Sister      rheumatoid     Asthma Sister      CANCER Sister 53     Non-Hodgkins lymphoma     KIDNEY DISEASE Sister      Polycystic kidney disease. Transplant age 50     KIDNEY DISEASE Sister      Polycystic kidney disease. Transplant age 51     Myocardial Infarction Mother 53     2nd MI age 56     CEREBROVASCULAR DISEASE Mother 53     Respiratory Father      Emphysema     Myocardial Infarction Maternal Grandmother      Hypertension Maternal Grandfather      KIDNEY DISEASE Maternal Grandfather      polycystic kidney disease     Anxiety Disorder Brother      KIDNEY DISEASE Daughter      Polycystic kidney disease          Allergies:      Allergies   Allergen Reactions     No Known Allergies           Medications:     Prior to Admission medications    Medication Sig Last Dose Taking? Auth Provider   felodipine ER (PLENDIL) 2.5 MG 24 hr tablet TAKE 1 TABLET (2.5 MG) BY MOUTH DAILY   Cyn Crow MD   warfarin (COUMADIN) 2.5 MG tablet Take 5 mg on Mon, Wed, Fri; 2.5 mg all other days or as directed by INR clinic   Cyn Crow MD   folic acid (FOLVITE) 1 MG tablet TAKE 2 TABLETS (2,000 MCG) BY MOUTH DAILY   Cyn Crow MD   amoxicillin (AMOXIL) 500 MG capsule Take 1 capsule (500 mg) by mouth daily as needed   Cyn Crow MD   alendronate (FOSAMAX) 70 MG tablet Take 1 tablet (70 mg) by mouth once a week   Cyn Crow MD   albuterol (PROAIR  HFA/PROVENTIL HFA/VENTOLIN HFA) 108 (90 BASE) MCG/ACT Inhaler Inhale 2 puffs into the lungs every 4 hours as needed for shortness of breath / dyspnea or wheezing   Cyn Crow MD   simvastatin (ZOCOR) 20 MG tablet Take 1 tablet (20 mg) by mouth At Bedtime   Cyn Crow MD   calcitRIOL (ROCALTROL) 0.5 MCG capsule Take 1 capsule (0.5 mcg) by mouth daily   Cyn Crow MD   sulfamethoxazole-trimethoprim (BACTRIM) 400-80 MG per tablet Take 1 tablet by mouth daily   Cyn Crow MD   diltiazem (TIAZAC) 240 MG 24 hr ER beaded capsule Take 1 capsule (240 mg) by mouth daily   Cyn Crow MD   cloNIDine (CATAPRES) 0.2 MG tablet Take 1 tablet (0.2 mg) by mouth 2 times daily   Cyn Crow MD   furosemide (LASIX) 80 MG tablet Take 1 tablet (80 mg) by mouth 2 times daily   Cyn Crow MD   folic acid (FOLVITE) 1 MG tablet TAKE 2 TABLETS (2,000 MCG) BY MOUTH DAILY   Cyn Crow MD   felodipine ER (PLENDIL) 2.5 MG 24 hr tablet TAKE 1 TABLET (2.5 MG) BY MOUTH DAILY   Cyn Crow MD   cefdinir (OMNICEF) 300 MG capsule Take 1 capsule (300 mg) by mouth 2 times daily   Cyn Crow MD   ASPIRIN NOT PRESCRIBED, INTENTIONAL, 1 each continuous prn for other Antiplatelet medication not prescribed intentionally due to Current anticoagulant therapy (warfarin/enoxaparin)   Laura Gerard MD   GLUCOSAMINE SULFATE PO Take 750 mLs by mouth daily   Reported, Patient   betamethasone dipropionate (DIPROSONE) 0.05 % cream Apply sparingly to affected area twice daily as needed.  Do not apply to face.   Cyn Crow MD   cycloSPORINE modified (GENGRAF) 25 MG capsule Take 3 capsules by mouth 2 times daily.   Sonido Xie MD   ORDER FOR DME Please dispense 2 pair of knee high compression 20-30 stockings   Laura Gerard MD   ferrous sulfate 325 (65 FE) MG tablet Take 1 tablet by mouth daily.   Laura Gerard MD   ACE NOT PRESCRIBED,  INTENTIONAL, by Other route continuous prn.   Laura Gerard MD   ARB NOT PRESCRIBED, INTENTIONAL, by Other route continuous prn.   Laura Gerard MD   VITAMIN B-6 100 MG OR TABS OTC   2 tabs daily per transplant clinic   Reported, Patient   VITAMIN B-12 500 MCG OR TABS OTC    1 tab daily per transplant clinic   Reported, Patient   TUMS OR Take 2 tablets by mouth 3 times daily.   Reported, Patient   OMEGA 3 550 MG OR CAPS Take 2,000 mg by mouth 2 times daily.   Reported, Patient   AZATHIOPRINE 50 MG OR TABS Take three tablets by mouth daily             Review of Systems:   A Comprehensive greater than 10 system review of systems was carried out.  Pertinent positives and negatives are noted above.  Otherwise negative for contributory information.          Physical Exam:   Blood pressure 162/77, resp. rate 15, SpO2 94 %.    GENERAL: healthy, alert and no distress  EYES: Eyes grossly normal to inspection  HENT: ear canals- normal; TMs- normal; Nose- normal; Mouth- no ulcers, no lesions. ORAL MUCOSA: mildly dry  NECK: no tenderness, no adenopathy, no asymmetry, no masses, no stiffness; thyroid- normal to palpation  RESP: lungs clear to auscultation - no rales, no rhonchi, no wheezes  CV: regular rates and rhythm, normal S1 S2, no S3 or S4 and no murmur, click or rub  ABDOMEN: soft, no tenderness, no  hepatosplenomegaly, no masses, normal bowel sounds  MS: extremities- no gross deformities noted, no peripheral edema  SKIN: no suspicious lesions, no rashes  NEURO: Normal strength and tone, sensory exam grossly normal  PSYCH: Alert and oriented times 3; coherent speech, normal rate and volume, able to articulate logical thoughts, able to abstract reason, no tangential thoughts, no hallucinations or delusions. Affect is normal.         Data:     EKG demonstrates: rate of 83 bpm in sinus rhythm with 1st degree AV block with occasional PVCs, left axis deviation, incomplete RBBB, LVH with repolarization abnormality at 18:17  with repeat showing rate of 87 bpm in sinus rhythm with 1st degree AV block, left axis deviation, incomplete RBBB, and prolonged QT.     Results for orders placed or performed during the hospital encounter of 02/23/18   CBC with platelets differential   Result Value Ref Range    WBC 8.6 4.0 - 11.0 10e9/L    RBC Count 4.52 3.8 - 5.2 10e12/L    Hemoglobin 14.1 11.7 - 15.7 g/dL    Hematocrit 41.2 35.0 - 47.0 %    MCV 91 78 - 100 fl    MCH 31.2 26.5 - 33.0 pg    MCHC 34.2 31.5 - 36.5 g/dL    RDW 13.9 10.0 - 15.0 %    Platelet Count 172 150 - 450 10e9/L    Diff Method Automated Method     % Neutrophils 53.8 %    % Lymphocytes 36.3 %    % Monocytes 7.5 %    % Eosinophils 1.4 %    % Basophils 0.3 %    % Immature Granulocytes 0.7 %    Nucleated RBCs 0 0 /100    Absolute Neutrophil 4.6 1.6 - 8.3 10e9/L    Absolute Lymphocytes 3.1 0.8 - 5.3 10e9/L    Absolute Monocytes 0.7 0.0 - 1.3 10e9/L    Absolute Eosinophils 0.1 0.0 - 0.7 10e9/L    Absolute Basophils 0.0 0.0 - 0.2 10e9/L    Abs Immature Granulocytes 0.1 0 - 0.4 10e9/L    Absolute Nucleated RBC 0.0    Basic metabolic panel   Result Value Ref Range    Sodium 139 133 - 144 mmol/L    Potassium 3.4 3.4 - 5.3 mmol/L    Chloride 103 94 - 109 mmol/L    Carbon Dioxide 27 20 - 32 mmol/L    Anion Gap 9 3 - 14 mmol/L    Glucose 109 (H) 70 - 99 mg/dL    Urea Nitrogen 42 (H) 7 - 30 mg/dL    Creatinine 2.03 (H) 0.52 - 1.04 mg/dL    GFR Estimate 24 (L) >60 mL/min/1.7m2    GFR Estimate If Black 29 (L) >60 mL/min/1.7m2    Calcium 10.8 (H) 8.5 - 10.1 mg/dL   INR   Result Value Ref Range    INR 1.70 (H) 0.86 - 1.14   D dimer quantitative   Result Value Ref Range    D Dimer <0.3 0.0 - 0.50 ug/ml FEU   EKG 12-lead, tracing only   Result Value Ref Range    Interpretation ECG Click View Image link to view waveform and result    EKG 12 lead   Result Value Ref Range    Interpretation ECG Click View Image link to view waveform and result    Troponin POCT   Result Value Ref Range    Troponin I 0.05  0.00 - 0.10 ug/L       Libby Kirkland PA-C

## 2018-02-24 NOTE — PLAN OF CARE
Problem: Patient Care Overview  Goal: Discharge Needs Assessment  Outcome: Improving  PRIMARY DIAGNOSIS: GENERALIZED WEAKNESS    OUTPATIENT/OBSERVATION GOALS TO BE MET BEFORE DISCHARGE  1. Orthostatic performed: Yes:          Lying Orthostatic BP: 161/54         Sitting Orthostatic BP: 169/79         Standing Orthostatic BP: 166/79     2. Tolerating PO medications: Yes    3. Return to near baseline physical activity: Yes    4. Cleared for discharge by consultants (if involved): No    Discharge Planner Nurse   Safe discharge environment identified: Yes  Barriers to discharge: No       Entered by: Sabiha Duffy 02/24/2018 9:00am     Please review provider order for any additional goals.   Nurse to notify provider when observation goals have been met and patient is ready for discharge.    BP elevated- hx of HTN, given morning meds. Alert and oriented x4. Up SBA. Denies pain. Denies dizziness/CP. Echo results pending and have a zio patch placed outpatient. NS infusing @ 75. Will continue to monitor.

## 2018-02-24 NOTE — PLAN OF CARE
Problem: Patient Care Overview  Goal: Plan of Care/Patient Progress Review  Outcome: Improving  Problem: Patient Care Overview  Goal: Plan of Care/Patient Progress Review  Outcome: No Change  PRIMARY DIAGNOSIS: SYNCOPE  OUTPATIENT/OBSERVATION GOALS TO BE MET BEFORE DISCHARGE:  1. Orthostatic performed: No    2. Diagnostic testing complete & at baseline neurologic testing: Yes    3. Cleared by consultants (if involved): No    4. Interpretation of cardiac rhythm per telemetry tech: Frequent PVCs with 1st degree AVB    5. Tolerating adequate PO diet and medications: Yes    6. Return to near baseline physical activity or neurologic status: No    Discharge Planner Nurse   Safe discharge environment identified: Yes  Barriers to discharge: Yes       Entered by: Veronica Martin 02/24/2018 6:37AM      Pt still very unsteady with ambulation, she states this is her baseline. Denies pain. Denies dizziness. VSS, A&O. Would like to go home today. Slept on and off throughout night. NS infusing @ 75.     Please review provider order for any additional goals.   Nurse to notify provider when observation goals have been met and patient is ready for discharge.

## 2018-02-24 NOTE — ED NOTES
Witnessed syncopal event during dinner at nursing home. Hypertensive for EMS, c/o substernal CP that she has had for several months. Heart eval by PCP negative. CP worsens with activity.

## 2018-02-24 NOTE — ED NOTES
[facility]  ED Nurse Handoff Report    Elise Mason is a 72 year old female   ED Chief complaint: Loss of Consciousness  . ED Diagnosis:   Final diagnoses:   Syncope and collapse   Lightheadedness   History of renal transplant     Allergies:   Allergies   Allergen Reactions     No Known Allergies        Code Status: Full Code  Activity level - Baseline/Home:  Independent. Activity Level - Current:   Stand with Assist. Lift room needed: No. Bariatric: No   Needed: No   Isolation: No. Infection: Not Applicable.     Vital Signs:   Vitals:    02/23/18 1845 02/23/18 1900 02/23/18 1915 02/23/18 1930   BP: 173/87 (!) 159/92  150/83   Resp: 17 17 14 (!) 35   SpO2: 93% 93% 93% 93%       Cardiac Rhythm:  ,   Cardiac  Cardiac Rhythm: Normal sinus rhythm  Pain level:    Patient confused: Yes. Patient Falls Risk: Yes.   Elimination Status: Has voided   Patient Report - Initial Complaint: Witnessed syncopal event during dinner at nursing home. Hypertensive for EMS, c/o substernal CP that she has had for several months. Heart eval by PCP negative. CP worsens with activity. Focused Assessment: LS clear, cardiac work-up negative, continues with epigastric/substernal pain and worsens with ambulation (has been present for months). Elise Mason is a 72 year old female with a history of cerebral infarction on Warfarin, cervical cancer, basal cell carcinoma, and hypertension who presents with a loss of consciousness. The patient states she had a completely normal day and was sitting down to eat dinner with her spouse at their nursing home around 1730 this evening when she suddenly passed out. Her spouse notes he caught her before she fell to the ground, and estimates she was unconscious for 90 seconds. He denies any unusual behavior including speech difficulties or weakness prior to the syncopal event. The patient reports feeling confused and lightheaded when she came to and could not remember what  happened. She denies any recent illness or changes in medications. The patient notes some mild chest pain when walking every once in a while over the past several months, but denies this immediately prior to the syncopal event. The patient states she feels a little nauseous on presentation to the ED, but otherwise denies any diarrhea, shortness of breath, palpitations, diaphoresis, urinary symptoms, blood in the stool or tarry stools.      Tests Performed: labs Abnormal Results:   Labs Ordered and Resulted from Time of ED Arrival Up to the Time of Departure from the ED   BASIC METABOLIC PANEL - Abnormal; Notable for the following:        Result Value    Glucose 109 (*)     Urea Nitrogen 42 (*)     Creatinine 2.03 (*)     GFR Estimate 24 (*)     GFR Estimate If Black 29 (*)     Calcium 10.8 (*)     All other components within normal limits   INR - Abnormal; Notable for the following:     INR 1.70 (*)     All other components within normal limits   CBC WITH PLATELETS DIFFERENTIAL   D DIMER QUANTITATIVE   ISTAT TROPONIN NURSING POCT   PULSE OXIMETRY NURSING   CARDIAC CONTINUOUS MONITORING   PERIPHERAL IV CATHETER   PATIENT CARE ORDER   VITAL SIGNS   TROPONIN POCT     No orders to display      Treatments provided: cardiac monitor  Family Comments:  at bedside  OBS brochure/video discussed/provided to patient:  Yes  ED Medications: Medications - No data to display  Drips infusing:  No  For the majority of the shift, the patient's behavior Green. Interventions performed were .     Severe Sepsis OR Septic Shock Diagnosis Present: No    ED Nurse Name/Phone Number: Belén Hummel,   7:41 PM  RECEIVING UNIT ED HANDOFF REVIEW    Above ED Nurse Handoff Report was reviewed: Yes  Reviewed by: Sabiha Duffy on February 23, 2018 at 8:54 PM

## 2018-02-24 NOTE — ED NOTES
Bed: ED09  Expected date: 2/23/18  Expected time:   Means of arrival:   Comments:  BVM 2, 71 y/o F syncopal

## 2018-02-24 NOTE — ED PROVIDER NOTES
History     Chief Complaint:  Loss of Consciousness    HPI   Elise Mason is a 72 year old female with a history of cerebral infarction on Warfarin, cervical cancer, basal cell carcinoma, and hypertension who presents with a loss of consciousness. The patient states she had a completely normal day and was sitting down to eat dinner with her spouse at their nursing home around 1730 this evening when she suddenly passed out. Her spouse notes he caught her before she fell to the ground, and estimates she was unconscious for 90 seconds. He denies any unusual behavior including speech difficulties or weakness prior to the syncopal event. The patient reports feeling confused and lightheaded when she came to and could not remember what happened. She denies any recent illness or changes in medications. The patient notes some mild chest pain when walking every once in a while over the past several months, but denies this immediately prior to the syncopal event. The patient states she feels a little nauseous on presentation to the ED, but otherwise denies any diarrhea, shortness of breath, palpitations, diaphoresis, urinary symptoms, blood in the stool or tarry stools.     Allergies:  No known drug allergies     Medications:    Plendil  Warfarin  Folic acid  Fosamax  Albuterol inhaler  Simvastatin  Calcitriol  Bactrim  Tiazac  Clonidine  Lasix  Aspirin  Glucosamine sulfate  Cyclosporine  Ferrous sulfate  Azathioprine    Past Medical History:    Anemia  Cervical cancer  Compression fracture lumbar vertebra  Cerebral infarction  Esophageal reflux  Osteoporosis  Polycystic kidney  Hypertension  Basal cell carcinoma  Squamous cell skin cancer    Past Surgical History:    Left hip replacement  Hysterectomy due to cervical cancer  Transplant kidney, right side    Family History:    Rheumatoid arthritis  Asthma  Non-Hodgkins lymphoma  Polycystic kidney disease with transplant  Myocardial infarction, age 56  Cerebrovascular  disease  Emphysema  Anxiety    Social History:  Smoking status: Former smoker, 1/1/1990  Alcohol use: Yes, rarely  PCP: Cyn Crow  Presents to the ED with her spouse  Marital Status:  [2]     Review of Systems   Constitutional: Negative for chills and fever.   Respiratory: Negative for cough and shortness of breath.    Cardiovascular: Negative for chest pain and palpitations.   Gastrointestinal: Positive for nausea. Negative for blood in stool, diarrhea and vomiting.   Neurological: Positive for syncope and light-headedness.   All other systems reviewed and are negative.    Physical Exam     Patient Vitals for the past 24 hrs:   BP Heart Rate Resp SpO2   02/23/18 1930 150/83 82 (!) 35 93 %   02/23/18 1915 - 83 14 93 %   02/23/18 1900 (!) 159/92 84 17 93 %   02/23/18 1845 173/87 80 17 93 %   02/23/18 1830 - 89 18 93 %     Physical Exam  General: The patient is alert, in no respiratory distress.    HENT: Mucous membranes moist.    Cardiovascular: Regular rate and rhythm. Good pulses in all four extremities. Normal capillary refill and skin turgor.     Respiratory: Lungs are clear. No nasal flaring. No retractions. No wheezing, no crackles.    Gastrointestinal: Abdomen soft. No guarding, no rebound. No palpable hernias.     Musculoskeletal: No gross deformity.     Skin: Mild pallor. No rashes or petechiae.     Neurologic: The patient is alert and oriented x3. GCS 15. No testable cranial nerve deficit. Follows commands with clear and appropriate speech. Gives appropriate answers. Good strength in all extremities. No gross neurologic deficit. Gross sensation intact. Pupils are round and reactive. No meningismus.     Lymphatic: No cervical adenopathy. Some bilateral lower extremity swelling.    Psychiatric: The patient is non-tearful.    Emergency Department Course   ECG (18:17:09):  Rate 83 bpm. VT interval 248. QRS duration 104. QT/QTc 396/465. P-R-T axes 32 -39 75. Sinus rhythm with 1st degree AV block  with occasional premature ventricular complexes. Left axis deviation. Incomplete right bundle branch block. Left ventricular hypertrophy with repolarization abnormality. Abnormal ECG. Interpreted at 1820 by Andrea Reyes MD.    ECG (18:53:14):  Rate 87 bpm. NM interval 226. QRS duration 102. QT/QTc 410/493. P-R-T axes 40 -40 46. Sinus rhythm with 1st degree AV block. Left axis deviation. Incomplete right bundle branch block. Voltage criteria for left ventricular hypertrophy. Prolonged QT. Abnormal ECG. Interpreted at 1856 by Andrea Reyes MD.    Laboratory:  CBC: WNL (WBC 6.6, HGB 14.1, )   Troponin: 0.05  BMP: Glucose 109 (H), BUN 42 (H), Creatinine 2.03 (H), GFR 24 (L), Calcium 10.8 (H), o/w WNL  INR: 1.70 (H)  D-dimer: <0.3    Interventions:  150 mg Imuran PO and 75 mg Cyclosporine PO administered on admission    Emergency Department Course:  The patient arrived in the emergency department via EMS.  Past medical records, nursing notes, and vitals reviewed.  1816: I performed an exam of the patient and obtained history, as documented above.  ECG performed, results above.  IV inserted and blood drawn.    1853: A repeat ECG was performed due to an abnormality in lead AVR, results above.    1914: I rechecked the patient. Explained findings to the patient. We discussed staying for observation due to the unknown etiology of her syncope. She agreed to this plan.    1939: I spoke to Digna Kirkland PA-C for Dr. Madrigal of the hospitalist service who accepts the patient for admission.     Findings and plan explained to the patient who consents to admission. Discussed the patient with Digna Kirkland PA-C for Dr. Madrigal, who will admit the patient to an observation bed for further monitoring, evaluation, and treatment.     Impression & Plan    Medical Decision Making:  Elise Mason is a 72 year old female who presents to the ED via EMS for a syncopal episode witnessed by her .  There was no features that sounded like they led to this or would explain the cause behind this event. The patient had been talking and then began to tumble over, and her  thankfully caught her. Her ECG did show an abnormality isolated to AVR and stable. I think this is likely due to her blood pressure. She does have blood pressure medication she denies taking prior to passing out. Her pressures did trend down nicely here. Her studies came back negative without signs of cardiac ischemia, but she will require admission to the observation unit. She is a renal transplant patient and her renal function has trended down, but does not look significantly depressed from previously. She was admitted to observation in good condition.    Diagnosis:    ICD-10-CM   1. Syncope and collapse R55   2. Lightheadedness R42   3. History of renal transplant Z94.0     Disposition: Admitted to observation    Nicolette Duran  2/23/2018   [facility] EMERGENCY DEPARTMENT    INicolette, am serving as a scribe at 6:16 PM on 2/23/2018 to document services personally performed by Andrea Reyes MD based on my observations and the provider's statements to me.          Andrea Reyes MD  02/23/18 [id number]

## 2018-02-24 NOTE — PLAN OF CARE
Problem: Patient Care Overview  Goal: Plan of Care/Patient Progress Review  Outcome: No Change  PRIMARY DIAGNOSIS: SYNCOPE  OUTPATIENT/OBSERVATION GOALS TO BE MET BEFORE DISCHARGE:  1. Orthostatic performed: No    2. Diagnostic testing complete & at baseline neurologic testing: Yes    3. Cleared by consultants (if involved): No    4. Interpretation of cardiac rhythm per telemetry tech: Frequent PVCs with 1st degree AVB    5. Tolerating adequate PO diet and medications: Yes    6. Return to near baseline physical activity or neurologic status: No    Discharge Planner Nurse   Safe discharge environment identified: Yes  Barriers to discharge: Yes       Entered by: Veronica Martin 02/24/2018 2:31 AM      Pt still very unsteady with ambulation. Possibly ECHO in Am. Would like to go home today. Sleeping well throughout night. NS infusing @ 75  Please review provider order for any additional goals.   Nurse to notify provider when observation goals have been met and patient is ready for discharge.

## 2018-02-24 NOTE — PHARMACY-ANTICOAGULATION SERVICE
Clinical Pharmacy - Warfarin Dosing Consult     Pharmacy has been consulted to manage this patient s warfarin therapy.  Indication: Other - specify in comments  Therapy Goal: INR 2-3  Warfarin Prior to Admission: Yes  Warfarin PTA Regimen: 5 mg MWF, 2.5 mg ROW  Significant drug interactions: imuran  Recent documented change in oral intake/nutrition: Unknown  Dose Comments:  CVA, INR 1.70, warfarin 5 mg today    INR   Date Value Ref Range Status   02/23/2018 1.70 (H) 0.86 - 1.14 Final     INR Protime   Date Value Ref Range Status   02/23/2018 2.1 (A) 0.86 - 1.14 Final       Recommend warfarin 5 mg today.  Pharmacy will monitor Elise Mason daily and order warfarin doses to achieve specified goal.      Please contact pharmacy as soon as possible if the warfarin needs to be held for a procedure or if the warfarin goals change.

## 2018-02-24 NOTE — PHARMACY-ADMISSION MEDICATION HISTORY
Admission medication history interview status for this patient is complete. See [facility] admission navigator for allergy information, prior to admission medications and immunization status.     Medication history interview source(s):Patient  Medication history resources (including written lists, pill bottles, clinic record):[facility] med list  Primary pharmacy:[facility]    Changes made to [facility] medication list:  Added: none  Deleted: [employer]  Changed: none    Actions taken by pharmacist (provider contacted, etc):None     Additional medication history information:None    Medication reconciliation/reorder completed by provider prior to medication history? No    Do you take OTC medications (eg tylenol, ibuprofen, fish oil, eye/ear drops, etc)? yes(Y/N)    For patients on insulin therapy: NO (Y/N)  Lantus/levemir/NPH/Mix 70/30 dose:   (Y/N) (see Med list for doses)   Sliding scale Novolog Y/N  If Yes, do you have a baseline novolog pre-meal dose:  units with meals  Patients eat three meals a day:   Y/N    How many episodes of hypoglycemia do you have per week: _______  How many missed doses do you have per week: ______  How many times do you check your blood glucose per day: _______   Any Barriers to therapy - Be specific :  cost of medications, comfortable with giving injections (if applicable), comfortable and confident with current diabetes regimen: Y/N ______________      Prior to Admission medications    Medication Sig Last Dose Taking? Auth Provider   warfarin (COUMADIN) 2.5 MG tablet Take 5 mg on Mon, Wed, Fri; 2.5 mg all other days or as directed by INR clinic 2/22/2018 at pm 2.5mg Yes Cyn Crow MD   albuterol (PROAIR HFA/PROVENTIL HFA/VENTOLIN HFA) 108 (90 BASE) MCG/ACT Inhaler Inhale 2 puffs into the lungs every 4 hours as needed for shortness of breath / dyspnea or wheezing 2/23/2018 at 0800 Yes Cyn Crow MD   simvastatin (ZOCOR) 20 MG tablet Take 1 tablet (20 mg) by mouth At Bedtime  2/22/2018 at hs Yes Cyn Crow MD   calcitRIOL (ROCALTROL) 0.5 MCG capsule Take 1 capsule (0.5 mcg) by mouth daily 2/23/2018 at am Yes Cyn Crow MD   sulfamethoxazole-trimethoprim (BACTRIM) 400-80 MG per tablet Take 1 tablet by mouth daily 2/23/2018 at am Yes Cyn Crow MD   diltiazem (TIAZAC) 240 MG 24 hr ER beaded capsule Take 1 capsule (240 mg) by mouth daily 2/23/2018 at am Yes Cyn Crow MD   cloNIDine (CATAPRES) 0.2 MG tablet Take 1 tablet (0.2 mg) by mouth 2 times daily 2/23/2018 at am Yes Cyn Crow MD   furosemide (LASIX) 80 MG tablet Take 1 tablet (80 mg) by mouth 2 times daily 2/23/2018 at 1400 Yes Cyn Crow MD   folic acid (FOLVITE) 1 MG tablet TAKE 2 TABLETS (2,000 MCG) BY MOUTH DAILY 2/23/2018 at am Yes Cyn Crow MD   felodipine ER (PLENDIL) 2.5 MG 24 hr tablet TAKE 1 TABLET (2.5 MG) BY MOUTH DAILY 2/23/2018 at am Yes Cyn Crow MD   GLUCOSAMINE SULFATE PO Take 750 mLs by mouth daily 2/23/2018 at Unknown time Yes Reported, Patient   cycloSPORINE modified (GENGRAF) 25 MG capsule Take 3 capsules by mouth 2 times daily. 2/23/2018 at am Yes Sonido Xie MD   ferrous sulfate 325 (65 FE) MG tablet Take 1 tablet by mouth daily. 2/23/2018 at am Yes Laura Gerard MD   VITAMIN B-6 100 MG OR TABS OTC   2 tabs daily per transplant clinic 2/23/2018 at am Yes Reported, Patient   VITAMIN B-12 500 MCG OR TABS OTC    1 tab daily per transplant clinic 2/23/2018 at am Yes Reported, Patient   OMEGA 3 550 MG OR CAPS Take 2,000 mg by mouth 2 times daily. 2/23/2018 at am Yes Reported, Patient   AZATHIOPRINE 50 MG OR TABS Take three tablets by mouth daily 2/22/2018 at 2000 Yes    amoxicillin (AMOXIL) 500 MG capsule Take 1 capsule (500 mg) by mouth daily as needed dental rx  Cyn Crow MD   alendronate (FOSAMAX) 70 MG tablet Take 1 tablet (70 mg) by mouth once a week 2/21/2018 at am  Cyn Crow MD   ASPIRIN NOT  PRESCRIBED, INTENTIONAL, 1 each continuous prn for other Antiplatelet medication not prescribed intentionally due to Current anticoagulant therapy (warfarin/enoxaparin)   Laura Gerard MD   betamethasone dipropionate (DIPROSONE) 0.05 % cream Apply sparingly to affected area twice daily as needed.  Do not apply to face.   Cyn Crow MD   ORDER FOR DME Please dispense 2 pair of knee high compression 20-30 stockings   Laura Gerard MD   ACE NOT PRESCRIBED, INTENTIONAL, by Other route continuous prn.   Laura Gerard MD   ARB NOT PRESCRIBED, INTENTIONAL, by Other route continuous prn.   Laura Gerard MD   TUMS OR Take 2 tablets by mouth 3 times daily.   Reported, Patient

## 2018-02-24 NOTE — DISCHARGE SUMMARY
Formerly [facility] Outpatient / Observation Unit  Discharge Summary        Elise Mason MRN# [id number]   YOB: 1945 Age: 72 year old     Date of Admission:  2/23/2018  Date of Discharge:  2/24/2018  Admitting Physician:  Conchita Madrigal, DO  Discharge Physician: Alecia Aguilar PA-C, HAILEE  Discharging Service: Hospitalist      Primary Provider: Cyn Crow  Primary Care Physician Phone Number: [phone]         Primary Discharge Diagnoses:    Elise Mason was admitted on 2/23/2018 for episode of syncope.     1. Syncopal Episode: Suspect vaso-vagal in nature. Labs were unremarkable other than mild dehydration. Telemetry showed a 1st degree AVB and PVCs. ECHO was obtained and showed no significant abnormalities. The patient will be discharged home with plans for outpatient ZioPatch placement sometime next week.           Secondary Discharge Diagnoses:     Past Medical History:   Diagnosis Date     Anemia, unspecified      Cervical cancer (H) 1972    Hysterectomy, still has ovaries     Compression fracture of lumbar vertebra (H) July 1996    L1 and L4? Happened while walking down stairs. Didn't fall.     CVA (cerebral infarction) Nov 1995 & Nov 1996    on coumadin     Esophageal reflux      Long-term (current) use of anticoagulants      Nonspecific abnormal unspecified cardiovascular function study      Osteoporosis, unspecified      Other specified disorder resulting from impaired renal function      Polycystic kidney, unspecified type      Unspecified essential hypertension             Code Status:      Full Code        Brief Hospital Summary:         Please refer to initial admission history and physical for further details.   Briefly, Elise Mason was admitted on 2/23/2018 for episode of syncope.  Initial work up in the ED did not reveal evidence of significant cardiac arrhthymias or neurologic abnormalities.  Pt was registered to the Observation Unit for further  evaluation.      Pt was placed on telemetry and started on IVF hydration. ECHO was performed to r/o significant valvular dysfunction with results outlined below. Labs reviewed and significant results addressed. On the day of discharge, pt has not had any further episodes of syncope, no significant arrhythmias detected or concerning ECHO findings as the cause of syncope. Vitals were stable and pt was deemed safe for discharge. Medications were reviewed and adjustments made as necessary. Pt is instructed to follow up as below.           Significant Lab During Hospitalization:      No results for input(s): PH, PHV, PO2, PO2V, SAT, PCO2, PCO2V, HCO3, HCO3V in the last 168 hours.    Recent Labs  Lab 02/24/18 0538 02/23/18 1840   WBC 6.2 8.6   HGB 13.2 14.1   HCT 39.7 41.2   MCV 93 91   * 172          Lab Results   Component Value Date     02/24/2018     02/23/2018     02/09/2018    Lab Results   Component Value Date    CHLORIDE 105 02/24/2018    CHLORIDE 103 02/23/2018    CHLORIDE 106 02/09/2018    Lab Results   Component Value Date    BUN 33 02/24/2018    BUN 42 02/23/2018    BUN 41 02/09/2018      Lab Results   Component Value Date    POTASSIUM 3.4 02/24/2018    POTASSIUM 3.4 02/23/2018    POTASSIUM 4.3 02/09/2018    Lab Results   Component Value Date    CO2 30 02/24/2018    CO2 27 02/23/2018    CO2 29 02/09/2018    Lab Results   Component Value Date    CR 1.67 02/24/2018    CR 2.03 02/23/2018    CR 1.92 02/09/2018        No results for input(s): CULT in the last 168 hours.    Recent Labs  Lab 02/24/18  0538 02/23/18  1840    139   POTASSIUM 3.4 3.4   CHLORIDE 105 103   CO2 30 27   ANIONGAP 6 9   * 109*   BUN 33* 42*   CR 1.67* 2.03*   GFRESTIMATED 30* 24*   GFRESTBLACK 36* 29*   KRISTINA 9.4 10.8*     No results for input(s): NTBNPI, NTBNP in the last 168 hours.    Recent Labs  Lab 02/23/18 1840   DD <0.3       Recent Labs  Lab 02/24/18 0538 02/23/18  1840 02/23/18   INR 1.77*  1.70* 2.1*     No results for input(s): LACT in the last 168 hours.  No results for input(s): TSH in the last 168 hours.    Recent Labs  Lab 18  1839   TROPONIN 0.05     No results for input(s): COLOR, APPEARANCE, URINEGLC, URINEBILI, URINEKETONE, SG, UBLD, URINEPH, PROTEIN, UROBILINOGEN, NITRITE, LEUKEST, RBCU, WBCU in the last 168 hours.             Significant Imaging During Hospitalization:      Echocardiogram   Status:  Edited Result - FINAL   Visible to patient:  No (Not Released) Next appt:  2018 at 10:00 AM in Nursing ([facility]) Order: [id number]     Details      Reading Physician Reading Date Result Priority     Yayo Macias MD 2018          Narrative           [id number]  [id number]  [id number]  [id number]^SARAH^TYLER^FRANCISCO           [facility]  Echocardiography Laboratory  [address] [place] [address]        Name: CARLIN SMALL  MRN: [id number]  : 1945  Study Date: 2018 07:42 AM  Age: 72 yrs  Gender: Female  Patient Location: [facility]  Reason For Study: Syncope  Ordering Physician: TYLER SMITH  Referring Physician: [facility]  Performed By: Michel Gutiérrez     BSA: 2.1 m2  Height: 64 in  Weight: 244 lb  BP: 186/80 mmHg  _____________________________________________________________________________  __        Procedure  Complete Portable Echo Adult.  _____________________________________________________________________________  __        Interpretation Summary     The left ventricle is normal in size.  There is borderline concentric left ventricular hypertrophy.  The visual ejection fraction is estimated at 55-60%.  Diastolic Doppler findings (E/E' ratio and/or other parameters) suggest left  ventricular filling pressures are increased.  No regional wall motion abnormalities noted.  There is no color Doppler evidence of an atrial shunt.  The interatrial septum bows toward the right atrium, consistent with increased  left  atrial pressure.  Borderline aortic root dilatation.  Anterior fat pad  _____________________________________________________________________________  __        Left Ventricle  The left ventricle is normal in size. There is borderline concentric left  ventricular hypertrophy. The visual ejection fraction is estimated at 55-60%.  Diastolic Doppler findings (E/E' ratio and/or other parameters) suggest left  ventricular filling pressures are increased. No regional wall motion  abnormalities noted.     Right Ventricle  The right ventricle is normal in size and function.     Atria  The left atrium is moderately dilated. Right atrial size is normal. There is  no color Doppler evidence of an atrial shunt. The interatrial septum bows  toward the right atrium, consistent with increased left atrial pressure.     Mitral Valve  The mitral valve leaflets are mildly thickened. There is moderate (2+) mitral  regurgitation.        Tricuspid Valve  There is mild (1+) tricuspid regurgitation. Normal IVC (1.5-2.5cm) with >50%  respiratory collapse; right atrial pressure is estimated at 5-10mmHg. The  right ventricular systolic pressure is approximated at 33.2 mmHg plus the  right atrial pressure.     Aortic Valve  There is trivial trileaflet aortic sclerosis. There is trace aortic  regurgitation.     Pulmonic Valve  There is trace pulmonic valvular regurgitation.     Vessels  Borderline aortic root dilatation.     Pericardium  There is no pericardial effusion. Anterior fat pad.        Rhythm  Sinus rhythm was noted.  _____________________________________________________________________________  __  MMode/2D Measurements & Calculations     RVDd: 2.5 cm  IVSd: 1.3 cm  LVIDd: 5.7 cm  LVIDs: 3.9 cm  LVPWd: 1.2 cm  FS: 31.4 %  LV mass(C)d: 301.0 grams  LV mass(C)dI: 141.4 grams/m2  Ao root diam: 3.7 cm  LA dimension: 4.1 cm  LA/Ao: 1.1  LA Volume (BP): 97.0 ml  LA Volume Index (BP): 45.5 ml/m2  RWT: 0.41           Doppler Measurements &  Calculations  MV E max ron: 92.3 cm/sec  MV A max ron: 118.0 cm/sec  MV E/A: 0.78  MV P1/2t max ron: 94.3 cm/sec  MV P1/2t: 56.6 msec  MVA(P1/2t): 3.9 cm2  MV dec slope: 488.0 cm/sec2  Ao V2 max: 153.0 cm/sec  Ao max P.0 mmHg  Ao V2 mean: 104.0 cm/sec  Ao mean P.0 mmHg  Ao V2 VTI: 44.0 cm  LV V1 max PG: 3.6 mmHg  LV V1 max: 94.7 cm/sec  LV V1 VTI: 25.0 cm  MR ERO: 0.23 cm2  MR volume: 57.9 ml  PA V2 max: 89.2 cm/sec  PA max PG: 3.2 mmHg  TR max ron: 288.0 cm/sec  TR max P.2 mmHg  E/E' av.3  Lateral E/e': 11.6  Medial E/e': 15.0              _____________________________________________________________________________  __        Report approved by: Rob Kelly 2018 09:50 AM                      Pending Results:        Unresulted Labs Ordered in the Past 30 Days of this Admission     No orders found for last 61 day(s).              Consultations This Hospital Stay:      No consultations were requested during this admission         Discharge Instructions and Follow-Up:      Follow-up Appointments     Follow-up and recommended labs and tests        1. Please call to set up the heart monitor. [phone].  2. Drink plenty of fluids.   3. Follow up with your family doctor in 1 week for recheck.   4. Return to the ER if anything worsens or changes.                  Pt instructed to follow up with PCP in 7 days and with Consultant if indicated.   Follow-up Labs BMP        Discharge Disposition:      Discharged to home         Discharge Medications:        Current Discharge Medication List      CONTINUE these medications which have NOT CHANGED    Details   warfarin (COUMADIN) 2.5 MG tablet Take 5 mg on Mon, Wed, Fri; 2.5 mg all other days or as directed by INR clinic  Qty: 115 tablet, Refills: 1    Associated Diagnoses: Long-term (current) use of anticoagulants; History of CVA (cerebrovascular accident)      albuterol (PROAIR HFA/PROVENTIL HFA/VENTOLIN HFA) 108 (90 BASE) MCG/ACT Inhaler Inhale  2 puffs into the lungs every 4 hours as needed for shortness of breath / dyspnea or wheezing  Qty: 1 Inhaler, Refills: 0    Associated Diagnoses: Dyspnea on exertion      simvastatin (ZOCOR) 20 MG tablet Take 1 tablet (20 mg) by mouth At Bedtime  Qty: 90 tablet, Refills: 2    Associated Diagnoses: Pure hypercholesterolemia      calcitRIOL (ROCALTROL) 0.5 MCG capsule Take 1 capsule (0.5 mcg) by mouth daily  Qty: 90 capsule, Refills: 2    Associated Diagnoses: Kidney replaced by transplant      sulfamethoxazole-trimethoprim (BACTRIM) 400-80 MG per tablet Take 1 tablet by mouth daily  Qty: 90 tablet, Refills: 2    Associated Diagnoses: Kidney replaced by transplant      diltiazem (TIAZAC) 240 MG 24 hr ER beaded capsule Take 1 capsule (240 mg) by mouth daily  Qty: 90 capsule, Refills: 2    Associated Diagnoses: Kidney replaced by transplant; Benign essential hypertension      cloNIDine (CATAPRES) 0.2 MG tablet Take 1 tablet (0.2 mg) by mouth 2 times daily  Qty: 180 tablet, Refills: 2    Associated Diagnoses: Kidney replaced by transplant; Benign essential hypertension      furosemide (LASIX) 80 MG tablet Take 1 tablet (80 mg) by mouth 2 times daily  Qty: 180 tablet, Refills: 2    Associated Diagnoses: Kidney replaced by transplant      folic acid (FOLVITE) 1 MG tablet TAKE 2 TABLETS (2,000 MCG) BY MOUTH DAILY  Qty: 180 tablet, Refills: 0    Associated Diagnoses: Kidney replaced by transplant      felodipine ER (PLENDIL) 2.5 MG 24 hr tablet TAKE 1 TABLET (2.5 MG) BY MOUTH DAILY  Qty: 90 tablet, Refills: 0    Associated Diagnoses: Kidney replaced by transplant; Benign essential hypertension      GLUCOSAMINE SULFATE PO Take 750 mLs by mouth daily      cycloSPORINE modified (GENGRAF) 25 MG capsule Take 3 capsules by mouth 2 times daily.  Qty: 540 capsule, Refills: 11    Associated Diagnoses: Kidney replaced by transplant      ferrous sulfate 325 (65 FE) MG tablet Take 1 tablet by mouth daily.  Qty: 90 tablet, Refills: 1     Associated Diagnoses: Kidney replaced by transplant      VITAMIN B-6 100 MG OR TABS OTC   2 tabs daily per transplant clinic      VITAMIN B-12 500 MCG OR TABS OTC    1 tab daily per transplant clinic      OMEGA 3 550 MG OR CAPS Take 2,000 mg by mouth 2 times daily.      AZATHIOPRINE 50 MG OR TABS Take three tablets by mouth daily  Qty: 90, Refills: 11      amoxicillin (AMOXIL) 500 MG capsule Take 1 capsule (500 mg) by mouth daily as needed  Qty: 4 capsule, Refills: 1    Associated Diagnoses: Kidney replaced by transplant      alendronate (FOSAMAX) 70 MG tablet Take 1 tablet (70 mg) by mouth once a week  Qty: 12 tablet, Refills: 2    Comments: Patient will call when she needs these rx's refilled.  Associated Diagnoses: Age related osteoporosis, unspecified pathological fracture presence      ASPIRIN NOT PRESCRIBED, INTENTIONAL, 1 each continuous prn for other Antiplatelet medication not prescribed intentionally due to Current anticoagulant therapy (warfarin/enoxaparin)  Qty: 0 each, Refills: 0    Associated Diagnoses: History of CVA (cerebrovascular accident)      betamethasone dipropionate (DIPROSONE) 0.05 % cream Apply sparingly to affected area twice daily as needed.  Do not apply to face.  Qty: 45 g, Refills: 4    Associated Diagnoses: Dermatitis      ORDER FOR DME Please dispense 2 pair of knee high compression 20-30 stockings  Qty: 2 each, Refills: 0    Associated Diagnoses: Kidney replaced by transplant; Polycystic kidney, unspecified type; Edema      !! ACE NOT PRESCRIBED, INTENTIONAL, by Other route continuous prn.  Refills: 0    Associated Diagnoses: Polycystic kidney, unspecified type; Kidney replaced by transplant      !! ARB NOT PRESCRIBED, INTENTIONAL, by Other route continuous prn.  Refills: 0    Associated Diagnoses: Polycystic kidney, unspecified type; Kidney replaced by transplant      TUMS OR Take 2 tablets by mouth 3 times daily.       !! - Potential duplicate medications found. Please discuss  "with provider.              Allergies:         Allergies   Allergen Reactions     No Known Allergies            Condition and Physical on Discharge:      Discharge condition: Stable   Vitals: Blood pressure 167/76, temperature 97.7  F (36.5  C), temperature source Oral, resp. rate 20, height 1.626 m (5' 4\"), weight 110.9 kg (244 lb 8 oz), SpO2 91 %.  244 lbs 8 oz      GENERAL:  Comfortable.  PSYCH: pleasant, oriented, No acute distress.  HEENT:  PERRLA. Normal conjunctiva, normal hearing, nasal mucosa and Oropharynx are normal.  NECK:  Supple, no neck vein distention, adenopathy or bruits, normal thyroid.  HEART:  Normal S1, S2 with no murmur, no pericardial rub, gallops or S3 or S4.  LUNGS:  Clear to auscultation, normal Respiratory effort. No wheezing, rales or ronchi.  ABDOMEN:  Soft, no hepatosplenomegaly, normal bowel sounds. Non-tender, non distended.   EXTREMITIES:  No pedal edema, +2 pulses bilateral and equal.  SKIN:  Dry to touch, No rash, wound or ulcerations.  NEUROLOGIC:  CN 2-12 intact, BL 5/5 symmetric upper and lower extremity strength, sensation is intact with no focal deficits.     "

## 2018-02-24 NOTE — PLAN OF CARE
Problem: Patient Care Overview  Goal: Discharge Needs Assessment  Outcome: Adequate for Discharge Date Met: 02/24/18  Patient's After Visit Summary was reviewed with patient.  Patient verbalized understanding of After Visit Summary, recommended follow up and was given an opportunity to ask questions.   Discharge medications sent home with patient/family: No   Discharged with spouse    OBSERVATION patient END time: 11am

## 2018-02-26 ENCOUNTER — TELEPHONE (OUTPATIENT)
Dept: PEDIATRICS | Facility: CLINIC | Age: 73
End: 2018-02-26

## 2018-02-26 DIAGNOSIS — Z86.73 HISTORY OF CVA (CEREBROVASCULAR ACCIDENT): ICD-10-CM

## 2018-02-26 DIAGNOSIS — Z79.01 LONG-TERM (CURRENT) USE OF ANTICOAGULANTS: Primary | ICD-10-CM

## 2018-02-26 NOTE — TELEPHONE ENCOUNTER
"ED / Discharge Outreach Protocol    Patient Contact    Attempt # 1    Was call answered?  Yes.  \"May I please speak with <patient name>\"  Is patient available?   Yes    Hospital/TCU/ED for chronic condition Discharge Protocol    \"Hi, my name is Lori Mclaughlin, a registered nurse, and I am calling from [facility].  I am calling to follow up and see how things are going for you after your recent emergency visit/hospital/TCU stay.\"    Tell me how you are doing now that you are home?\" I vomited when I got home. I think I may have had the flu as well. I had some bruising from when they did blood pressures. That isn't usual for me and my INR in the hospital was abnormal as well.       Discharge Instructions    \"Let's review your discharge instructions.  What is/are the follow-up recommendations?  Pt. Response: I am to follow up in a week.     \"Has an appointment with your primary care provider been scheduled?\"   No (schedule appointment)    \"When you see the provider, I would recommend that you bring your medications with you.\"    Medications    \"Tell me what changed about your medicines when you discharged?\"    Changes to chronic meds?    0-1    \"What questions do you have about your medications?\"    None     New diagnoses of heart failure, COPD, diabetes, or MI?    No          On warfarin: \"Were you given any recommendations for follow-up with the anticoagulation clinic?\" Message sent to coumadin clinic    Medication reconciliation completed? Yes  Was MTM referral placed (*Make sure to put transitions as reason for referral)?   No    Call Summary    \"What questions or concerns do you have about your recent visit and your follow-up care?\"     none    \"If you have questions or things don't continue to improve, we encourage you contact us through the main clinic number (give number).  Even if the clinic is not open, triage nurses are available 24/7 to help you.     We would like you to know that our clinic has " "extended hours (provide information).  We also have urgent care (provide details on closest location and hours/contact info)\"      \"Thank you for your time and take care!\"    Lori Mclaughlin RN               "

## 2018-02-26 NOTE — TELEPHONE ENCOUNTER
"Spoke to patient by phone.  Had some vomiting after being discharged from the hospital. That has resolved.  Now is having diarrhea. No blood noted.  Is drinking fluids to prevent dehydration, but \"they are running right through me\".  Has a follow up appointment on Friday.   No INR nurse in [facility] on Friday, but she will go to the lab for INR recheck.  Instructed her to take 5 mg warfarin Friday and Sat if INR is <1.8.  Will call her next week.    Fallon Velazco RN  [facility]        "

## 2018-02-26 NOTE — TELEPHONE ENCOUNTER
Please contact patient for In-patient follow up.  [phone] (home) none (work)    Visit date: [id number]  Diagnosis listed: Syncope And Collapse  Number of visits in past 12 months: 0 ED / 0 IP

## 2018-02-27 ENCOUNTER — HOSPITAL ENCOUNTER (OUTPATIENT)
Dept: CARDIOLOGY | Facility: CLINIC | Age: 73
Discharge: HOME | End: 2018-02-27
Attending: PHYSICIAN ASSISTANT | Admitting: PHYSICIAN ASSISTANT
Payer: COMMERCIAL

## 2018-02-27 DIAGNOSIS — R55 SYNCOPE AND COLLAPSE: ICD-10-CM

## 2018-02-27 DIAGNOSIS — I47.29 PAROXYSMAL VENTRICULAR TACHYCARDIA (H): Primary | ICD-10-CM

## 2018-02-27 PROCEDURE — 0298T ZZC EXT ECG > 48HR TO 21 DAY REVIEW AND INTERPRETATN: CPT | Performed by: INTERNAL MEDICINE

## 2018-02-27 PROCEDURE — 0296T ZIO PATCH HOLTER: CPT | Performed by: PHYSICIAN ASSISTANT

## 2018-02-27 NOTE — PROGRESS NOTES
Placed a 14 day Zio patch.  Written and verbal instructions were given and all questions were answered.

## 2018-03-02 ENCOUNTER — OFFICE VISIT (OUTPATIENT)
Dept: PEDIATRICS | Facility: CLINIC | Age: 73
End: 2018-03-02
Payer: COMMERCIAL

## 2018-03-02 ENCOUNTER — ANTICOAGULATION THERAPY VISIT (OUTPATIENT)
Dept: NURSING | Facility: CLINIC | Age: 73
End: 2018-03-02
Payer: COMMERCIAL

## 2018-03-02 VITALS
WEIGHT: 242.7 LBS | BODY MASS INDEX: 41.44 KG/M2 | OXYGEN SATURATION: 95 % | SYSTOLIC BLOOD PRESSURE: 122 MMHG | HEIGHT: 64 IN | DIASTOLIC BLOOD PRESSURE: 62 MMHG | HEART RATE: 70 BPM | TEMPERATURE: 98.4 F

## 2018-03-02 DIAGNOSIS — Z79.01 LONG-TERM (CURRENT) USE OF ANTICOAGULANTS: ICD-10-CM

## 2018-03-02 DIAGNOSIS — R55 SYNCOPE, UNSPECIFIED SYNCOPE TYPE: Primary | ICD-10-CM

## 2018-03-02 DIAGNOSIS — Z86.73 HISTORY OF CVA (CEREBROVASCULAR ACCIDENT): ICD-10-CM

## 2018-03-02 DIAGNOSIS — R10.9 RIGHT FLANK PAIN: ICD-10-CM

## 2018-03-02 DIAGNOSIS — I63.50 CEREBRAL ARTERY OCCLUSION WITH CEREBRAL INFARCTION (H): ICD-10-CM

## 2018-03-02 DIAGNOSIS — Z09 HOSPITAL DISCHARGE FOLLOW-UP: ICD-10-CM

## 2018-03-02 LAB
ALBUMIN UR-MCNC: NEGATIVE MG/DL
APPEARANCE UR: CLEAR
BACTERIA #/AREA URNS HPF: ABNORMAL /HPF
BILIRUB UR QL STRIP: NEGATIVE
COLOR UR AUTO: YELLOW
GLUCOSE UR STRIP-MCNC: NEGATIVE MG/DL
HGB UR QL STRIP: NEGATIVE
INR PPP: 8.29 (ref 0.86–1.14)
KETONES UR STRIP-MCNC: NEGATIVE MG/DL
LEUKOCYTE ESTERASE UR QL STRIP: ABNORMAL
NITRATE UR QL: NEGATIVE
NON-SQ EPI CELLS #/AREA URNS LPF: ABNORMAL /LPF
PH UR STRIP: 5.5 PH (ref 5–7)
RBC #/AREA URNS AUTO: ABNORMAL /HPF
SOURCE: ABNORMAL
SP GR UR STRIP: <=1.005 (ref 1–1.03)
UROBILINOGEN UR STRIP-ACNC: 0.2 EU/DL (ref 0.2–1)
WBC #/AREA URNS AUTO: ABNORMAL /HPF

## 2018-03-02 PROCEDURE — 85610 PROTHROMBIN TIME: CPT | Performed by: PEDIATRICS

## 2018-03-02 PROCEDURE — 99207 ZZC NO CHARGE NURSE ONLY: CPT

## 2018-03-02 PROCEDURE — 81001 URINALYSIS AUTO W/SCOPE: CPT | Performed by: INTERNAL MEDICINE

## 2018-03-02 PROCEDURE — 36415 COLL VENOUS BLD VENIPUNCTURE: CPT | Performed by: PEDIATRICS

## 2018-03-02 PROCEDURE — 99214 OFFICE O/P EST MOD 30 MIN: CPT | Performed by: INTERNAL MEDICINE

## 2018-03-02 NOTE — PATIENT INSTRUCTIONS
1) Continue the z-patch monitor.  2) Will check urine today.  Otherwise, observe right flank pain and return to clinic next week (with Dr. Crow) if it is not improving or worsening.  3) For diarrhea, continue to drink plenty of fluids.  If worsening or is not resolved after another week, follow-up with Dr. Crow.    Otherwise, follow-up for your routine yearly physical (around August).      INR is high.  I gave you 1 mg of vitamin K today.  Please follow-up tomorrow (Saturday) at [facility] for a repeat INR test.  I will contact you with further instructions.    Please follow-up in [place] [facility]R clinic on Monday at 11:15 am.

## 2018-03-02 NOTE — MR AVS SNAPSHOT
After Visit Summary   3/2/2018    Elise Mason    MRN: [id number]           Patient Information     Date Of Birth          1945        Visit Information        Provider Department      3/2/2018 10:50 AM Adryan Fonseca Mai, MD [facility]        Today's Diagnoses     Syncope, unspecified syncope type    -  1    Hospital discharge follow-up        Right flank pain        Long-term (current) use of anticoagulants        History of CVA (cerebrovascular accident)          Care Instructions    1) Continue the z-patch monitor.  2) Will check urine today.  Otherwise, observe right flank pain and return to clinic next week (with Dr. Crow) if it is not improving or worsening.  3) For diarrhea, continue to drink plenty of fluids.  If worsening or is not resolved after another week, follow-up with Dr. Crow.    Otherwise, follow-up for your routine yearly physical (around August).      INR is high.  I gave you 1 mg of vitamin K today.  Please follow-up tomorrow (Saturday) at [facility] lab for a repeat INR test.  I will contact you with further instructions.    Please follow-up in [place] INR clinic on Monday at 11:15 am.          Follow-ups after your visit        Your next 10 appointments already scheduled     Mar 05, 2018 11:15 AM CST   Anticoagulation Visit with  ANTICOAGULATION [facility] ([facility])    [address] [place] [address]  [address]  [facility] [address]   [phone]            Apr 06, 2018 10:00 AM CDT   Anticoagulation Visit with  ANTICOAGULATION [facility] ([facility])    [address] [place] [address]  [address]  [facility] [address]   [phone]              Future tests that were ordered for you today     Open Future Orders        Priority Expected Expires Ordered    INR STAT  3/2/2019 3/2/2018            Who to contact     If you have questions or need follow up information about today's clinic visit or  "your schedule please contact [facility] [place] directly at [phone].  Normal or non-critical lab and imaging results will be communicated to you by MyChart, letter or phone within 4 business days after the clinic has received the results. If you do not hear from us within 7 days, please contact the clinic through [employer] or phone. If you have a critical or abnormal lab result, we will notify you by phone as soon as possible.  Submit refill requests through [employer] or call your pharmacy and they will forward the refill request to us. Please allow 3 business days for your refill to be completed.          Additional Information About Your Visit        [employer] Information     [employer] gives you secure access to your electronic health record. If you see a primary care provider, you can also send messages to your care team and make appointments. If you have questions, please call your primary care clinic.  If you do not have a primary care provider, please call [phone] and they will assist you.        Care EveryWhere ID     This is your Care EveryWhere ID. This could be used by other organizations to access your [place] medical records  [id number]        Your Vitals Were     Pulse Temperature Height Pulse Oximetry BMI (Body Mass Index)       70 98.4  F (36.9  C) (Oral) 5' 4\" (1.626 m) 95% 41.66 kg/m2        Blood Pressure from Last 3 Encounters:   03/02/18 122/62   02/24/18 167/76   08/10/17 126/80    Weight from Last 3 Encounters:   03/02/18 242 lb 11.2 oz (110.1 kg)   02/23/18 244 lb 8 oz (110.9 kg)   08/10/17 254 lb 9.6 oz (115.5 kg)              We Performed the Following     INR     UA reflex to Microscopic     Urine Microscopic        Primary Care Provider Office Phone # Fax #    Cyn Crow [phone] [address] [place] [address]        Equal Access to Services     MAGDA LIRIANO AH: Hadii karen Hernandez, waaxda luqadaprabhjot, qaybta kaalmada " bibi jamaтатьяна rothaan ah. So [facility] [phone].    ATENCIÓN: Si ariesla yanna, tiene a mehta disposición servicios gratuitos de asistencia lingüística. Camille al [phone].    We comply with applicable federal civil rights laws and [place] laws. We do not discriminate on the basis of race, color, national origin, age, disability, sex, sexual orientation, or gender identity.            Thank you!     Thank you for choosing [facility] [place]  for your care. Our goal is always to provide you with excellent care. Hearing back from our patients is one way we can continue to improve our services. Please take a few minutes to complete the written survey that you may receive in the mail after your visit with us. Thank you!             Your Updated Medication List - Protect others around you: Learn how to safely use, store and throw away your medicines at www.disposemymeds.org.          This list is accurate as of 3/2/18  2:09 PM.  Always use your most recent med list.                   Brand Name Dispense Instructions for use Diagnosis    * ACE NOT PRESCRIBED (INTENTIONAL)      by Other route continuous prn.    Polycystic kidney, unspecified type, Kidney replaced by transplant       albuterol 108 (90 BASE) MCG/ACT Inhaler    PROAIR HFA/PROVENTIL HFA/VENTOLIN HFA    1 Inhaler    Inhale 2 puffs into the lungs every 4 hours as needed for shortness of breath / dyspnea or wheezing    Dyspnea on exertion       alendronate 70 MG tablet    FOSAMAX    12 tablet    Take 1 tablet (70 mg) by mouth once a week    Age related osteoporosis, unspecified pathological fracture presence       amoxicillin 500 MG capsule    AMOXIL    4 capsule    Take 1 capsule (500 mg) by mouth daily as needed    Kidney replaced by transplant       * ARB NOT PRESCRIBED (INTENTIONAL)      by Other route continuous prn.    Polycystic kidney, unspecified type, Kidney replaced by transplant       ASPIRIN NOT PRESCRIBED     INTENTIONAL    0 each    1 each continuous prn for other Antiplatelet medication not prescribed intentionally due to Current anticoagulant therapy (warfarin/enoxaparin)    History of CVA (cerebrovascular accident)       azaTHIOprine 50 MG tablet    IMURAN    90    Take three tablets by mouth daily        betamethasone dipropionate 0.05 % cream    DIPROSONE    45 g    Apply sparingly to affected area twice daily as needed.  Do not apply to face.    Dermatitis       calcitRIOL 0.5 MCG capsule    ROCALTROL    90 capsule    Take 1 capsule (0.5 mcg) by mouth daily    Kidney replaced by transplant       cloNIDine 0.2 MG tablet    CATAPRES    180 tablet    Take 1 tablet (0.2 mg) by mouth 2 times daily    Kidney replaced by transplant, Benign essential hypertension       cyanocolbalamin 500 MCG tablet    vitamin  B-12     OTC    1 tab daily per transplant clinic        cycloSPORINE modified 25 MG capsule     540 capsule    Take 3 capsules by mouth 2 times daily.    Kidney replaced by transplant       diltiazem 240 MG 24 hr ER beaded capsule    TIAZAC    90 capsule    Take 1 capsule (240 mg) by mouth daily    Kidney replaced by transplant, Benign essential hypertension       felodipine ER 2.5 MG 24 hr tablet    PLENDIL    90 tablet    TAKE 1 TABLET (2.5 MG) BY MOUTH DAILY    Kidney replaced by transplant, Benign essential hypertension       ferrous sulfate 325 (65 FE) MG tablet    IRON    90 tablet    Take 1 tablet by mouth daily.    Kidney replaced by transplant       folic acid 1 MG tablet    FOLVITE    180 tablet    TAKE 2 TABLETS (2,000 MCG) BY MOUTH DAILY    Kidney replaced by transplant       furosemide 80 MG tablet    LASIX    180 tablet    Take 1 tablet (80 mg) by mouth 2 times daily    Kidney replaced by transplant       GLUCOSAMINE SULFATE PO      Take 750 mLs by mouth daily        OMEGA 3 550 MG Caps   Generic drug:  LINOLENIC ACID      Take 2,000 mg by mouth 2 times daily.        * order for DME     2 each     Please dispense 2 pair of knee high compression 20-30 stockings    Kidney replaced by transplant, Polycystic kidney, unspecified type, Edema       pyridoxine 100 MG tablet    VITAMIN B-6     OTC   2 tabs daily per transplant clinic        simvastatin 20 MG tablet    ZOCOR    90 tablet    Take 1 tablet (20 mg) by mouth At Bedtime    Pure hypercholesterolemia       sulfamethoxazole-trimethoprim 400-80 MG per tablet    BACTRIM    90 tablet    Take 1 tablet by mouth daily    Kidney replaced by transplant       TUMS PO      Take 2 tablets by mouth 3 times daily.        warfarin 2.5 MG tablet    COUMADIN    115 tablet    Take 5 mg on Mon, Wed, Fri; 2.5 mg all other days or as directed by INR clinic    Long-term (current) use of anticoagulants, History of CVA (cerebrovascular accident)       * Notice:  This list has 3 medication(s) that are the same as other medications prescribed for you. Read the directions carefully, and ask your doctor or other care provider to review them with you.

## 2018-03-02 NOTE — PROGRESS NOTES
ANTICOAGULATION FOLLOW-UP CLINIC VISIT    Patient Name:  Elise Mason  Date:  3/2/2018  Contact Type:  Telephone/ Consultation with Dr Fonseca    SUBJECTIVE:     Patient Findings     Positives Change in medications (Vit K given by Dr Fonseca today - used [facility] stock), Change in diet/appetite (decreased appetite, taking fluids OK), Hospital admission (d/c hospital 2/24 - syncope), Other complaints (recent history of vomiting 2/24/18, current diarrhea, increased fatigue, c/o right flank pain today), OTC meds (Tylenol for right flank pain - not sure of amount)    Comments INR done at lab today following appt with PCP.   [facility] INR > 8.0, blood sample sent from [facility] to [facility] for confirmation - INR 8.29  All documentation followed discussin with Dr BLAS Fonseca at [facility].     Libby Rios RN  [facility]             OBJECTIVE    INR   Date Value Ref Range Status   03/02/2018 8.29 (HH) 0.86 - 1.14 Final     Comment:     Results confirmed by repeat test  Critical Value called to and read back by  TASHI MORA ON [id number] AT 1540 BY AA  Critical Value called to and read back by  DR FONSECA ON [id number] AT 1542 BY EL         ASSESSMENT / PLAN  INR assessment SUPRA    Recheck INR In: 1 DAY    INR Location Clinic      Anticoagulation Summary as of 3/2/2018     INR goal 2.0-3.0   Today's INR 8.29!   Maintenance plan 5 mg (2.5 mg x 2) on Mon, Wed, Fri; 2.5 mg (2.5 mg x 1) all other days   Full instructions 3/2: Hold; Otherwise 5 mg on Mon, Wed, Fri; 2.5 mg all other days   Weekly total 25 mg   Plan last modified Fallon Velazco, RN (2/9/2018)   Next INR check 3/3/2018   Target end date Indefinite    Indications   Long-term (current) use of anticoagulants [Z79.01]  Cerebral artery occlusion with cerebral infarction (H) [I63.50]  History of CVA (cerebrovascular accident) [Z86.73]         Anticoagulation Episode Summary     INR check location [facility]    Preferred lab     Send INR reminders to  [facility]    Comments 2.5mg tabs - dyllan dose // CALENDAR       Anticoagulation Care Providers     Provider Role Specialty Phone number    Cyn Crow MD  Internal Medicine [phone]            See the Encounter Report to view Anticoagulation Flowsheet and Dosing Calendar (Go to Encounters tab in chart review, and find the Anticoagulation Therapy Visit)    Dosage adjustment made based on physician directed care plan.    Libby Rios RN

## 2018-03-02 NOTE — MR AVS SNAPSHOT
Elise Mason   3/2/2018 12:00 PM   Anticoagulation Therapy Visit    Description:  72 year old female   Provider:  [facility]   Department:   Nurse           INR as of 3/2/2018     Today's INR 8.29!      Anticoagulation Summary as of 3/2/2018     INR goal 2.0-3.0   Today's INR 8.29!   Full instructions 3/2: Hold; Otherwise 5 mg on Mon, Wed, Fri; 2.5 mg all other days   Next INR check 3/3/2018    Indications   Long-term (current) use of anticoagulants [Z79.01]  Cerebral artery occlusion with cerebral infarction (H) [I63.50]  History of CVA (cerebrovascular accident) [Z86.73]         Description     INR done at [facility]      Your next Anticoagulation Clinic appointment(s)     Mar 05, 2018 11:15 AM CST   Anticoagulation Visit with  ANTICOAGULATION CLINIC   [facility] ([facility])    [address] [place] [address]  [address]  [facility] [address]   [phone]            Apr 06, 2018 10:00 AM CDT   Anticoagulation Visit with  ANTICOAGULATION CLINIC   [facility] ([facility])    [address] [place] [address]  [address]  [facility] [address]   [phone]              Contact Numbers     [facility]  Please call to cancel and/or reschedule your appointment, or with any problems or questions regarding your therapy.  Anticoagulation Nurse: [phone]  Main Clinic: [phone]             March 2018 Details    Sun Mon Tue Wed Thu Fri Sat         1               2      Hold   See details      3              4               5               6               7               8               9               10                 11               12               13               14               15               16               17                 18               19               20               21               22               23               24                 25               26               27               28               29                30 31                Date Details   03/02 This INR check   Hold dose   Vit K 1mg given per Dr Fonseca       Date of next INR:  3/3/2018         How to take your warfarin dose     To take:  2.5 mg Take 1 of the 2.5 mg tablets.    Hold Do not take your warfarin dose. See the Details table to the right for additional instructions.

## 2018-03-02 NOTE — PROGRESS NOTES
"  SUBJECTIVE:   Elise Mason is a 72 year old female who presents to clinic today for the following health issues:    Hospital Follow-up Visit:    Hospital/Nursing Home/IP Rehab Facility: [facility]  Date of Admission: 2/23/2018  Date of Discharge: 2/24/2018  Reason(s) for Admission: Syncope            Problems taking medications regularly:  None       Medication changes since discharge: None       Problems adhering to non-medication therapy:  None    Summary of hospitalization:  [facility] discharge summary reviewed  Diagnostic Tests/Treatments reviewed.  Follow up needed: none  Other Healthcare Providers Involved in Patient s Care:         None  Update since discharge: improved.     Post Discharge Medication Reconciliation: discharge medications reconciled, continue medications without change.  Plan of care communicated with patient     Coding guidelines for this visit:  Type of Medical   Decision Making Face-to-Face Visit       within 7 Days of discharge Face-to-Face Visit        within 14 days of discharge   Moderate Complexity [id number]   High Complexity 54440 [id number]            PROBLEMS TO ADD ON  - Having diarrhea and 2 episodes of vomiting after discharge on 2/24/2018.  Since then, has been having diarrhea 3-4 x/day with each meal and some nasal drip.  Took an \"antidiarrheal medication\" that was OTC 2 days ago (x 2) and yesterday had formed stools.  Appetite is coming back today.  No fevers, chills.  Increased fatigue, but improving.  Has been drinking plenty of fluids.    - Right flank pain x 2 days.  Improved with tylenol.  (took 625 mg x 3 total over 2 days).  No dysuria.  Has had pain like this before and it subsided on it's own.  Has known cysts on her old kidneys.    Problem list and histories reviewed & adjusted, as indicated.  Additional history: as documented    Patient Active Problem List   Diagnosis     Essential hypertension     Polycystic kidney     Osteoporosis     " Long-term (current) use of anticoagulants     CKD (chronic kidney disease) stage 3, GFR 30-59 ml/min     Esophageal reflux     Kidney replaced by transplant     Pure hypercholesterolemia     GENERAL OSTEOARTHROSIS     Mixed incontinence urge and stress (male)(female)     HYPERLIPIDEMIA LDL GOAL <100     Chronic back pain     Advanced directives, counseling/discussion     Impaired fasting glucose     History of CVA (cerebrovascular accident)     Squamous cell skin cancer     Cerebral artery occlusion with cerebral infarction (H)     BCC (basal cell carcinoma), arm, left     Polycystic liver disease     Immunosuppressed status (H)     Morbid obesity, unspecified obesity type (H)     Syncope     Past Surgical History:   Procedure Laterality Date     BIOPSY BREAST  1976 or 1977    left breast? No lump, bleeding from nipple     HYSTERECTOMY, PAP NO LONGER INDICATED  1972 or 1973    Hysterectomy due to cervical cancer     JOINT REPLACEMENT, HIP RT/LT      Left Hip Replacement     TRANSPLANT KIDNEY RECIPIENT LIVING RELATED  9/27/1995    Right side, both original kidneys left in place       Social History   Substance Use Topics     Smoking status: Former Smoker     Quit date: 1/1/1990     Smokeless tobacco: Never Used     Alcohol use Yes      Comment: 2-3x per year     Family History   Problem Relation Age of Onset     Arthritis Sister      rheumatoid     Asthma Sister      CANCER Sister 53     Non-Hodgkins lymphoma     KIDNEY DISEASE Sister      Polycystic kidney disease. Transplant age 50     KIDNEY DISEASE Sister      Polycystic kidney disease. Transplant age 51     Myocardial Infarction Mother 53     2nd MI age 56     CEREBROVASCULAR DISEASE Mother 53     Respiratory Father      Emphysema     Myocardial Infarction Maternal Grandmother      Hypertension Maternal Grandfather      KIDNEY DISEASE Maternal Grandfather      polycystic kidney disease     Anxiety Disorder Brother      KIDNEY DISEASE Daughter      Polycystic  kidney disease         Current Outpatient Prescriptions   Medication Sig Dispense Refill     warfarin (COUMADIN) 2.5 MG tablet Take 5 mg on Mon, Wed, Fri; 2.5 mg all other days or as directed by INR clinic 115 tablet 1     alendronate (FOSAMAX) 70 MG tablet Take 1 tablet (70 mg) by mouth once a week 12 tablet 2     albuterol (PROAIR HFA/PROVENTIL HFA/VENTOLIN HFA) 108 (90 BASE) MCG/ACT Inhaler Inhale 2 puffs into the lungs every 4 hours as needed for shortness of breath / dyspnea or wheezing 1 Inhaler 0     simvastatin (ZOCOR) 20 MG tablet Take 1 tablet (20 mg) by mouth At Bedtime 90 tablet 2     calcitRIOL (ROCALTROL) 0.5 MCG capsule Take 1 capsule (0.5 mcg) by mouth daily 90 capsule 2     sulfamethoxazole-trimethoprim (BACTRIM) 400-80 MG per tablet Take 1 tablet by mouth daily 90 tablet 2     diltiazem (TIAZAC) 240 MG 24 hr ER beaded capsule Take 1 capsule (240 mg) by mouth daily 90 capsule 2     cloNIDine (CATAPRES) 0.2 MG tablet Take 1 tablet (0.2 mg) by mouth 2 times daily 180 tablet 2     furosemide (LASIX) 80 MG tablet Take 1 tablet (80 mg) by mouth 2 times daily 180 tablet 2     folic acid (FOLVITE) 1 MG tablet TAKE 2 TABLETS (2,000 MCG) BY MOUTH DAILY 180 tablet 0     felodipine ER (PLENDIL) 2.5 MG 24 hr tablet TAKE 1 TABLET (2.5 MG) BY MOUTH DAILY 90 tablet 0     ASPIRIN NOT PRESCRIBED, INTENTIONAL, 1 each continuous prn for other Antiplatelet medication not prescribed intentionally due to Current anticoagulant therapy (warfarin/enoxaparin) 0 each 0     GLUCOSAMINE SULFATE PO Take 750 mLs by mouth daily       betamethasone dipropionate (DIPROSONE) 0.05 % cream Apply sparingly to affected area twice daily as needed.  Do not apply to face. 45 g 4     cycloSPORINE modified (GENGRAF) 25 MG capsule Take 3 capsules by mouth 2 times daily. 540 capsule 11     ORDER FOR DME Please dispense 2 pair of knee high compression 20-30 stockings 2 each 0     ferrous sulfate 325 (65 FE) MG tablet Take 1 tablet by mouth  "daily. 90 tablet 1     ACE NOT PRESCRIBED, INTENTIONAL, by Other route continuous prn.  0     ARB NOT PRESCRIBED, INTENTIONAL, by Other route continuous prn.  0     VITAMIN B-6 100 MG OR TABS OTC   2 tabs daily per transplant clinic       VITAMIN B-12 500 MCG OR TABS OTC    1 tab daily per transplant clinic       TUMS OR Take 2 tablets by mouth 3 times daily.       OMEGA 3 550 MG OR CAPS Take 2,000 mg by mouth 2 times daily.       AZATHIOPRINE 50 MG OR TABS Take three tablets by mouth daily 90 11     amoxicillin (AMOXIL) 500 MG capsule Take 1 capsule (500 mg) by mouth daily as needed (Patient not taking: Reported on 3/2/2018) 4 capsule 1     Allergies   Allergen Reactions     No Known Allergies        Reviewed and updated as needed this visit by clinical staff       Reviewed and updated as needed this visit by Provider         ROS:  All other systems on a 10-point review are negative, unless otherwise noted in HPI      OBJECTIVE:     /64 (BP Location: Right arm, Patient Position: Chair, Cuff Size: Adult Regular)  Pulse 65  Temp 98.2  F (36.8  C) (Oral)  Ht 5' 4\" (1.626 m)  Wt 242 lb 11.2 oz (110.1 kg)  SpO2 96%  BMI 41.66 kg/m2  Body mass index is 41.66 kg/(m^2).  GENERAL: alert, no distress and over weight  EYES: Eyes grossly normal to inspection, PERRL and conjunctivae and sclerae normal  HENT: ear canals and TM's normal, nose and mouth without ulcers or lesions  NECK: no adenopathy, no asymmetry, masses, or scars and thyroid normal to palpation  RESP: lungs clear to auscultation - no rales, rhonchi or wheezes  CV: regular rate and rhythm, normal S1 S2, no S3 or S4, no murmur, click or rub, no peripheral edema and peripheral pulses strong  ABDOMEN: soft, nontender, no hepatosplenomegaly, no masses and bowel sounds normal  MS: no gross musculoskeletal defects noted, no edema  SKIN: no suspicious lesions or rashes  NEURO: Normal strength and tone, mentation intact and speech normal      ASSESSMENT/PLAN: "     73 yo female with hx of PCKD s/p kidney transplant on long-term cyclosporine and bactrim ppx, hx of stroke on long term anticoagulation, CKD, HTN, HLD.  Patient is s/p discharge for one episode of syncope.  Work up was reassuring.  Cardio monitored for 24 hours with 1st degree AV block and PVCs noted.  ECHO was reassuring.  Labs stable.  Since discharge, no new episodes of syncope or pre-syncope.     1. Syncope, unspecified syncope type  Continue ziopatch monitor.    2. Hospital discharge follow-up  Improving.  Had acute gastroenteritis after discharge but is improved now.  Observe.    3. Right flank pain  Flank pain seems to be improving.  Vitals stable.  Exam normal.  Will check urinalysis and observe.  - UA reflex to Microscopic    Recommend close follow up if symptoms don't resolve.    Jey Fonseca MD  [facility]

## 2018-03-03 ENCOUNTER — HOSPITAL ENCOUNTER (OUTPATIENT)
Dept: LAB | Facility: CLINIC | Age: 73
Discharge: HOME | End: 2018-03-03
Attending: INTERNAL MEDICINE | Admitting: INTERNAL MEDICINE
Payer: COMMERCIAL

## 2018-03-03 DIAGNOSIS — Z79.01 LONG-TERM (CURRENT) USE OF ANTICOAGULANTS: ICD-10-CM

## 2018-03-03 LAB — INR PPP: 3.11 (ref 0.86–1.14)

## 2018-03-03 PROCEDURE — 36415 COLL VENOUS BLD VENIPUNCTURE: CPT | Performed by: INTERNAL MEDICINE

## 2018-03-03 PROCEDURE — 85610 PROTHROMBIN TIME: CPT | Performed by: INTERNAL MEDICINE

## 2018-03-03 NOTE — PROGRESS NOTES
"ADDENDUM:    Notified by lab today after appointment that INR POC came back \"critical\" ( > 8) and sent to lab stat.  Unclear why INR is supra therapeutic. May be related to gastroenteritis and poor PO.  No new medications that could interact.  In setting of multiple comorbidities (CKD, immunocompromised status) and recent syncope, and current flank pain without known source, I feel that she would benefit from further evaluation in the ER.  However, patient very strongly desired to try to manage outpatient.  She is currently vitally stable and able to make good decisions.  We agreed on a plan to give 1 mg of vitamin K now.  I discharged her with additional 1 mg of vitamin K in case INR comes back > 10.  Instructed her to recheck INR tomorrow at [facility] lab (order placed as stat).  Scheduled INR clinic follow-up Monday morning.  Anticipatory guidance about increased risk of bleeding and low threshold to go to ER.  Patient and  both expressed understanding and agreed with plan.    Jey Fonseca MD    "

## 2018-03-05 ENCOUNTER — ANTICOAGULATION THERAPY VISIT (OUTPATIENT)
Dept: NURSING | Facility: CLINIC | Age: 73
End: 2018-03-05
Payer: COMMERCIAL

## 2018-03-05 DIAGNOSIS — I63.50 CEREBRAL ARTERY OCCLUSION WITH CEREBRAL INFARCTION (H): ICD-10-CM

## 2018-03-05 DIAGNOSIS — Z86.73 HISTORY OF CVA (CEREBROVASCULAR ACCIDENT): ICD-10-CM

## 2018-03-05 DIAGNOSIS — Z79.01 LONG-TERM (CURRENT) USE OF ANTICOAGULANTS: ICD-10-CM

## 2018-03-05 LAB — INR POINT OF CARE: 2.3 (ref 0.86–1.14)

## 2018-03-05 PROCEDURE — 36416 COLLJ CAPILLARY BLOOD SPEC: CPT

## 2018-03-05 PROCEDURE — 99207 ZZC NO CHARGE NURSE ONLY: CPT

## 2018-03-05 PROCEDURE — 85610 PROTHROMBIN TIME: CPT | Mod: QW

## 2018-03-05 NOTE — MR AVS SNAPSHOT
Elise Mason   3/5/2018 11:15 AM   Anticoagulation Therapy Visit    Description:  72 year old female   Provider:   [facility]ICOAGULATION CLINIC   Department:   Nurse           INR as of 3/5/2018     Today's INR 2.3      Anticoagulation Summary as of 3/5/2018     INR goal 2.0-3.0   Today's INR 2.3   Full instructions 5 mg on Mon, Wed, Fri; 2.5 mg all other days   Next INR check 3/9/2018    Indications   Long-term (current) use of anticoagulants [Z79.01]  Cerebral artery occlusion with cerebral infarction (H) [I63.50]  History of CVA (cerebrovascular accident) [Z86.73]         Your next Anticoagulation Clinic appointment(s)     Mar 05, 2018 11:15 AM CST   Anticoagulation Visit with  ANTICOAGULATION CLINIC   [facility] ([facility]    [address] [place] [address]  [address]  [facility] [id number] [phone]            Mar 09, 2018 11:00 AM CST   Anticoagulation Visit with  ANTICOAGULATION CLINIC   [facility] ([facility]    [address] [place] [address]  [address]  [facility] [id number] [phone]              Contact Numbers     [facility]  Please call  [phone] to cancel and/or reschedule your appointment   Please call  [phone] with any problems or questions regarding your therapy.        March 2018 Details    Sun Mon Tue Wed Thu Fri Sat         1               2               3                 4               5      5 mg   See details      6      2.5 mg         7      5 mg         8      2.5 mg         9            10                 11               12               13               14               15               16               17                 18               19               20               21               22               23               24                 25               26               27               28               29               30               31                Date Details   03/05 This INR check       Date of  next INR:  3/9/2018         How to take your warfarin dose     To take:  2.5 mg Take 1 of the 2.5 mg tablets.    To take:  5 mg Take 2 of the 2.5 mg tablets.

## 2018-03-05 NOTE — PROGRESS NOTES
ANTICOAGULATION FOLLOW-UP CLINIC VISIT    Patient Name:  Elise Mason  Date:  3/5/2018  Contact Type:  Face to Face    SUBJECTIVE:     Patient Findings     Positives No Problem Findings    Comments Denies bleeding/bruising. Pt experienced flu-like sx's from Sat(02/24), had watery diarrhea for 3 days since Sat(02/24), took otc imodium twice last week(last dose was on 02/27. Appetite is back, able to keep food & fluids down, denies watery diarrhea, still has loose stools, but URI sx's have subsided. Didn't take any tamiflu. Took vitamin K 1 mg on 03/02 & held coumadin till last night. Didn't take the 2nd dose of vitamin K send home with her. Last dose of coumadin was on 03/01 evening.              OBJECTIVE    INR Protime   Date Value Ref Range Status   03/05/2018 2.3 (A) 0.86 - 1.14 Final       ASSESSMENT / PLAN  INR assessment THER    Recheck INR In: 4 DAYS    INR Location Clinic      Anticoagulation Summary as of 3/5/2018     INR goal 2.0-3.0   Today's INR 2.3   Maintenance plan 5 mg (2.5 mg x 2) on Mon, Wed, Fri; 2.5 mg (2.5 mg x 1) all other days   Full instructions 5 mg on Mon, Wed, Fri; 2.5 mg all other days   Weekly total 25 mg   Plan last modified Fallon Velazco, RN (2/9/2018)   Next INR check 3/9/2018   Target end date Indefinite    Indications   Long-term (current) use of anticoagulants [Z79.01]  Cerebral artery occlusion with cerebral infarction (H) [I63.50]  History of CVA (cerebrovascular accident) [Z86.73]         Anticoagulation Episode Summary     [facility]    Preferred lab     Send INR reminders to [facility]    Comments 2.5mg tabs - dyllan dose // CALENDAR       Anticoagulation Care Providers     Provider Role Specialty Phone number    Cyn Crow MD  Internal Medicine [phone]            See the Encounter Report to view Anticoagulation Flowsheet and Dosing Calendar (Go to Encounters tab in chart review, and find the Anticoagulation Therapy  Visit)    Blanca Holbrook RN

## 2018-03-09 ENCOUNTER — ANTICOAGULATION THERAPY VISIT (OUTPATIENT)
Dept: NURSING | Facility: CLINIC | Age: 73
End: 2018-03-09
Payer: COMMERCIAL

## 2018-03-09 DIAGNOSIS — Z86.73 HISTORY OF CVA (CEREBROVASCULAR ACCIDENT): ICD-10-CM

## 2018-03-09 DIAGNOSIS — I63.50 CEREBRAL ARTERY OCCLUSION WITH CEREBRAL INFARCTION (H): ICD-10-CM

## 2018-03-09 DIAGNOSIS — Z79.01 LONG-TERM (CURRENT) USE OF ANTICOAGULANTS: ICD-10-CM

## 2018-03-09 LAB — INR POINT OF CARE: 3.2 (ref 0.86–1.14)

## 2018-03-09 PROCEDURE — 85610 PROTHROMBIN TIME: CPT | Mod: QW

## 2018-03-09 PROCEDURE — 99207 ZZC NO CHARGE NURSE ONLY: CPT

## 2018-03-09 PROCEDURE — 36416 COLLJ CAPILLARY BLOOD SPEC: CPT

## 2018-03-09 NOTE — PROGRESS NOTES
ANTICOAGULATION FOLLOW-UP CLINIC VISIT    Patient Name:  Elise Mason  Date:  3/9/2018  Contact Type:  Face to Face    SUBJECTIVE:     Patient Findings     Comments Has post nasal drip and cough.  Getting better.  Diarrhea has totally resolved.           OBJECTIVE    INR Protime   Date Value Ref Range Status   03/09/2018 3.2 (A) 0.86 - 1.14 Final       ASSESSMENT / PLAN  INR assessment SUPRA    Recheck INR In: 2 WEEKS    INR Location Clinic      Anticoagulation Summary as of 3/9/2018     INR goal 2.0-3.0   Today's INR 3.2!   Maintenance plan 5 mg (2.5 mg x 2) on Mon, Wed, Fri; 2.5 mg (2.5 mg x 1) all other days   Full instructions 3/9: 2.5 mg; Otherwise 5 mg on Mon, Wed, Fri; 2.5 mg all other days   Weekly total 25 mg   Plan last modified Fallon Velazco RN (2/9/2018)   Next INR check 3/23/2018   Target end date Indefinite    Indications   Long-term (current) use of anticoagulants [Z79.01]  Cerebral artery occlusion with cerebral infarction (H) [I63.50]  History of CVA (cerebrovascular accident) [Z86.73]         Anticoagulation Episode Summary     [facility]    Preferred lab     Send INR reminders to [facility]    Comments 2.5mg tabs - dyllan dose // CALENDAR       Anticoagulation Care Providers     Provider Role Specialty Phone number    Cyn Crow MD  Internal Medicine [phone]            See the Encounter Report to view Anticoagulation Flowsheet and Dosing Calendar (Go to Encounters tab in chart review, and find the Anticoagulation Therapy Visit)        Fallon Velazco RN

## 2018-03-09 NOTE — MR AVS SNAPSHOT
Elise Mason   3/9/2018 11:00 AM   Anticoagulation Therapy Visit    Description:  72 year old female   Provider:   ANTICOAGULATION CLINIC   Department:   Nurse           INR as of 3/9/2018     Today's INR 3.2!      Anticoagulation Summary as of 3/9/2018     INR goal 2.0-3.0   Today's INR 3.2!   Full instructions 3/9: 2.5 mg; Otherwise 5 mg on Mon, Wed, Fri; 2.5 mg all other days   Next INR check 3/23/2018    Indications   Long-term (current) use of anticoagulants [Z79.01]  Cerebral artery occlusion with cerebral infarction (H) [I63.50]  History of CVA (cerebrovascular accident) [Z86.73]         Your next Anticoagulation Clinic appointment(s)     Mar 23, 2018 11:15 AM CDT   Anticoagulation Visit with  ANTICOAGULATION CLINIC   [facility] ([facility])    [address] [place] [address]  [address]  [facility] [id number] [phone]            Apr 06, 2018 10:00 AM CDT   Anticoagulation Visit with  ANTICOAGULATION CLINIC   [facility] ([facility])    [address] [place] [address]  [address]  [facility] [id number] [phone]              Contact Numbers     [facility]  Please call  [phone] to cancel and/or reschedule your appointment   Please call  [phone] with any problems or questions regarding your therapy.        March 2018 Details    Sun Mon Tue Wed Thu Fri Sat         1               2               3                 4               5               6               7               8               9      2.5 mg   See details      10      2.5 mg           11      2.5 mg         12      5 mg         13      2.5 mg         14      5 mg         15      2.5 mg         16      5 mg         17      2.5 mg           18      2.5 mg         19      5 mg         20      2.5 mg         21      5 mg         22      2.5 mg         23            24                 25               26               27               28               29               30                31                Date Details   03/09 This INR check       Date of next INR:  3/23/2018         How to take your warfarin dose     To take:  2.5 mg Take 1 of the 2.5 mg tablets.    To take:  5 mg Take 2 of the 2.5 mg tablets.

## 2018-03-23 ENCOUNTER — ANTICOAGULATION THERAPY VISIT (OUTPATIENT)
Dept: NURSING | Facility: CLINIC | Age: 73
End: 2018-03-23
Payer: COMMERCIAL

## 2018-03-23 DIAGNOSIS — Z79.01 LONG-TERM (CURRENT) USE OF ANTICOAGULANTS: ICD-10-CM

## 2018-03-23 DIAGNOSIS — Z86.73 HISTORY OF CVA (CEREBROVASCULAR ACCIDENT): ICD-10-CM

## 2018-03-23 DIAGNOSIS — I63.50 CEREBRAL ARTERY OCCLUSION WITH CEREBRAL INFARCTION (H): ICD-10-CM

## 2018-03-23 LAB — INR POINT OF CARE: 2.4 (ref 0.86–1.14)

## 2018-03-23 PROCEDURE — 85610 PROTHROMBIN TIME: CPT | Mod: QW

## 2018-03-23 PROCEDURE — 36416 COLLJ CAPILLARY BLOOD SPEC: CPT

## 2018-03-23 PROCEDURE — 99207 ZZC NO CHARGE NURSE ONLY: CPT

## 2018-03-23 NOTE — MR AVS SNAPSHOT
Elise Mason   3/23/2018 11:15 AM   Anticoagulation Therapy Visit    Description:  72 year old female   Provider:  [facility]   Department:   Nurse           INR as of 3/23/2018     Today's INR 2.4      Anticoagulation Summary as of 3/23/2018     INR goal 2.0-3.0   Today's INR 2.4   Full instructions 5 mg on Mon, Wed, Fri; 2.5 mg all other days   Next INR check 5/4/2018    Indications   Long-term (current) use of anticoagulants [Z79.01]  Cerebral artery occlusion with cerebral infarction (H) [I63.50]  History of CVA (cerebrovascular accident) [Z86.73]         Your next Anticoagulation Clinic appointment(s)     May 04, 2018 10:00 AM CDT   Anticoagulation Visit with  ANTICOAGULATION CLINIC   [facility] ([facility])    [address] [place] [address]  [address]  [facility] [address] [phone]              Contact Numbers     [facility]  Please call  [phone] to cancel and/or reschedule your appointment   Please call  [phone] with any problems or questions regarding your therapy.        March 2018 Details    Sun Mon Tue Wed Thu Fri Sat         1               2               3                 4               5               6               7               8               9               10                 11               12               13               14               15               16               17                 18               19               20               21               22               23      5 mg   See details      24      2.5 mg           25      2.5 mg         26      5 mg         27      2.5 mg         28      5 mg         29      2.5 mg         30      5 mg         31      2.5 mg          Date Details   03/23 This INR check               How to take your warfarin dose     To take:  2.5 mg Take 1 of the 2.5 mg tablets.    To take:  5 mg Take 2 of the 2.5 mg tablets.           April 2018 Details    Sun Mon Tue Wed Thu Fri Sat      1      2.5 mg         2      5 mg         3      2.5 mg         4      5 mg         5      2.5 mg         6      5 mg         7      2.5 mg           8      2.5 mg         9      5 mg         10      2.5 mg         11      5 mg         12      2.5 mg         13      5 mg         14      2.5 mg           15      2.5 mg         16      5 mg         17      2.5 mg         18      5 mg         19      2.5 mg         20      5 mg         21      2.5 mg           22      2.5 mg         23      5 mg         24      2.5 mg         25      5 mg         26      2.5 mg         27      5 mg         28      2.5 mg           29      2.5 mg         30      5 mg               Date Details   No additional details            How to take your warfarin dose     To take:  2.5 mg Take 1 of the 2.5 mg tablets.    To take:  5 mg Take 2 of the 2.5 mg tablets.           May 2018 Details    Sun Mon Tue Wed Thu Fri Sat       1      2.5 mg         2      5 mg         3      2.5 mg         4            5                 6               7               8               9               10               11               12                 13               14               15               16               17               18               19                 20               21               22               23               24               25               26                 27               28               29               30               31                  Date Details   No additional details    Date of next INR:  5/4/2018         How to take your warfarin dose     To take:  2.5 mg Take 1 of the 2.5 mg tablets.    To take:  5 mg Take 2 of the 2.5 mg tablets.

## 2018-03-23 NOTE — PROGRESS NOTES
ANTICOAGULATION FOLLOW-UP CLINIC VISIT    Patient Name:  Elise Mason  Date:  3/23/2018  Contact Type:  Face to Face    SUBJECTIVE:     Patient Findings     Positives No Problem Findings    Comments Some post nasal drip.  Thinks she may have allergies.           OBJECTIVE    INR Protime   Date Value Ref Range Status   03/23/2018 2.4 (A) 0.86 - 1.14 Final       ASSESSMENT / PLAN  INR assessment THER    Recheck INR In: 6 WEEKS    INR Location Clinic      Anticoagulation Summary as of 3/23/2018     INR goal 2.0-3.0   Today's INR 2.4   Maintenance plan 5 mg (2.5 mg x 2) on Mon, Wed, Fri; 2.5 mg (2.5 mg x 1) all other days   Full instructions 5 mg on Mon, Wed, Fri; 2.5 mg all other days   Weekly total 25 mg   No change documented Fallon Velazco RN   Plan last modified Fallon Velazco RN (2/9/2018)   Next INR check 5/4/2018   Target end date Indefinite    Indications   Long-term (current) use of anticoagulants [Z79.01]  Cerebral artery occlusion with cerebral infarction (H) [I63.50]  History of CVA (cerebrovascular accident) [Z86.73]         Anticoagulation Episode Summary     [facility]    Preferred lab     Send INR reminders to [facility]    Comments 2.5mg tabs - dyllan dose // CALENDAR       Anticoagulation Care Providers     Provider Role Specialty Phone number    Cyn Crow MD  Internal Medicine [phone]            See the Encounter Report to view Anticoagulation Flowsheet and Dosing Calendar (Go to Encounters tab in chart review, and find the Anticoagulation Therapy Visit)        Fallon Velazco RN

## 2018-03-28 ENCOUNTER — OFFICE VISIT (OUTPATIENT)
Dept: CARDIOLOGY | Facility: CLINIC | Age: 73
End: 2018-03-28
Payer: COMMERCIAL

## 2018-03-28 VITALS
OXYGEN SATURATION: 97 % | HEART RATE: 63 BPM | DIASTOLIC BLOOD PRESSURE: 70 MMHG | WEIGHT: 247.1 LBS | BODY MASS INDEX: 42.41 KG/M2 | SYSTOLIC BLOOD PRESSURE: 136 MMHG

## 2018-03-28 DIAGNOSIS — I20.89 STABLE ANGINA PECTORIS (H): ICD-10-CM

## 2018-03-28 DIAGNOSIS — R55 SYNCOPE, UNSPECIFIED SYNCOPE TYPE: Primary | ICD-10-CM

## 2018-03-28 DIAGNOSIS — I47.29 PAROXYSMAL VENTRICULAR TACHYCARDIA (H): ICD-10-CM

## 2018-03-28 PROCEDURE — 36415 COLL VENOUS BLD VENIPUNCTURE: CPT | Performed by: INTERNAL MEDICINE

## 2018-03-28 PROCEDURE — 84443 ASSAY THYROID STIM HORMONE: CPT | Performed by: INTERNAL MEDICINE

## 2018-03-28 PROCEDURE — 99204 OFFICE O/P NEW MOD 45 MIN: CPT | Performed by: INTERNAL MEDICINE

## 2018-03-28 PROCEDURE — 83735 ASSAY OF MAGNESIUM: CPT | Performed by: INTERNAL MEDICINE

## 2018-03-28 NOTE — PROGRESS NOTES
HISTORY:    Elise Mason is a very pleasant 72-year-old female with a history of polycystic kidney disease, status post kidney transplant in 1995, CKD, hypertension, history of CVA on chronic warfarin, anemia, GERD, cervical cancer status post hysterectomy, and lumbar compression fractions.  She struggles with obesity as well.  She was asked to see cardiology today because of a recent syncopal episode and to review the results of her Zio patch Holter.    The patient lives in an independent living home and has meals served in the dining room.  She was experiencing a normal day on February 23 and sat down for dinner feeling fine.  She was with her  and suddenly passed out.  She does not recall having any symptoms prior to this.  Per the chart and her 's recollection she was unresponsive for about 90 seconds with no loss of bowel or bladder control.  There was no seizure activity visible.  When she woke up she was briefly confused.  An ambulance was summoned and because she was in a dining room she walked across the room to get into the stretcher.  This is the only episode of syncope she has ever experienced.  She spent the night in the hospital and went home the next day.  When she arrived home she had a small lunch and felt very nauseated with episodes of vomiting followed by 3 days of diarrhea.  A 14 day monitor was arranged.    The 14 day monitor showed several brief episodes of asymptomatic ventricular tachycardia, the longest being 4 beats at 189 bpm.  She also had a longer run of ventricular arrhythmias and a rate of just 100, which would be an idioventricular rhythm rather than actual ventricular tachycardia.  In addition there were several runs of supraventricular ectopy labeled as SVT but a rate of only about 100 and completely asymptomatic.  Review of the strips show that these may be very transient atrial fibrillation.    The patient describes occasional episodes of dizziness if she  pushes herself too hard walking.  She now uses a walker and has not had these episodes, probably because she goes slower.  She also describes some intermittent aching substernal chest discomfort which is exercise induced.  Typically takes a few deep breaths and this discomfort resolves within a couple of minutes.  The discomfort does not radiate elsewhere and she does not have nausea or diaphoresis.  The discomfort is associated with significant dyspnea.    The patient has a history of smoking which she quit in 1990 after approximately a 50-pack-year smoking history.  She had pulmonary function tests showing a mild ventilatory defect, air trapping, and small airways showing a positive response to bronchodilator.  She does find herself getting short of breath with activity and uses a inhaler which she finds helpful.    Cardiac risk factors include the history of smoking listed above, she is glucose intolerant but not diabetic.  She has a history of treated hypertension and treated hypercholesterolemia.  She also has a relatively strong family history of coronary artery disease with her mother having an MI at age 50 followed by a stroke and then dying at age 53.  Her sister had an MI in her early 50s as well.    Elise reports that she has not had problems with strokelike symptoms recently, orthostasis, any sense of palpitations, symptoms of PND/orthopnea, significant peripheral edema, or claudication symptoms.    An echocardiogram was done which demonstrated borderline LVH, evidence for diastolic dysfunction, but normal ejection fraction without regional wall motion abnormalities.        ASSESSMENT/PLAN:    1.  Syncope.  Unclear etiology, no warning symptoms but the next day she developed gastrointestinal problems.  This may have been third spacing of fluid or vasovagal syncope.  Cardiac etiology is possible, see below.  2.  Chest discomfort.  Suspicious for angina, multiple cardiac risk factors, will arrange  nuclear stress test to evaluate.  3.  Increased ventricular arrhythmia.  The Holter monitor shows some brief episodes of asymptomatic ventricular tachycardia.  This may have been the cause of her syncope.  It may be related to coronary artery disease.  It is also very possible that her syncope was vasovagal.  The ventricular arrhythmia leads me to be more concerned that she may have coronary disease so we will follow up that pathway at this time.  4.  Hypertension.  Adequate control, continue diltiazem, felodipine, and clonidine.    Thank you for asking me to participate in the care of your delightful patient.  Please do not hesitate to call if I can be of further assistance.      Orders Placed This Encounter   Procedures     NM Lexiscan stress test (nuc card)     TSH     Magnesium     No orders of the defined types were placed in this encounter.    There are no discontinued medications.      Encounter Diagnoses   Name Primary?     Syncope, unspecified syncope type Yes     Stable angina pectoris (H)      Paroxysmal ventricular tachycardia (H)        CURRENT MEDICATIONS:  Current Outpatient Prescriptions   Medication Sig Dispense Refill     warfarin (COUMADIN) 2.5 MG tablet Take 5 mg on Mon, Wed, Fri; 2.5 mg all other days or as directed by INR clinic 115 tablet 1     amoxicillin (AMOXIL) 500 MG capsule Take 1 capsule (500 mg) by mouth daily as needed 4 capsule 1     alendronate (FOSAMAX) 70 MG tablet Take 1 tablet (70 mg) by mouth once a week 12 tablet 2     albuterol (PROAIR HFA/PROVENTIL HFA/VENTOLIN HFA) 108 (90 BASE) MCG/ACT Inhaler Inhale 2 puffs into the lungs every 4 hours as needed for shortness of breath / dyspnea or wheezing 1 Inhaler 0     simvastatin (ZOCOR) 20 MG tablet Take 1 tablet (20 mg) by mouth At Bedtime 90 tablet 2     calcitRIOL (ROCALTROL) 0.5 MCG capsule Take 1 capsule (0.5 mcg) by mouth daily 90 capsule 2     sulfamethoxazole-trimethoprim (BACTRIM) 400-80 MG per tablet Take 1 tablet by mouth  daily 90 tablet 2     diltiazem (TIAZAC) 240 MG 24 hr ER beaded capsule Take 1 capsule (240 mg) by mouth daily 90 capsule 2     cloNIDine (CATAPRES) 0.2 MG tablet Take 1 tablet (0.2 mg) by mouth 2 times daily 180 tablet 2     furosemide (LASIX) 80 MG tablet Take 1 tablet (80 mg) by mouth 2 times daily 180 tablet 2     folic acid (FOLVITE) 1 MG tablet TAKE 2 TABLETS (2,000 MCG) BY MOUTH DAILY 180 tablet 0     felodipine ER (PLENDIL) 2.5 MG 24 hr tablet TAKE 1 TABLET (2.5 MG) BY MOUTH DAILY 90 tablet 0     ASPIRIN NOT PRESCRIBED, INTENTIONAL, 1 each continuous prn for other Antiplatelet medication not prescribed intentionally due to Current anticoagulant therapy (warfarin/enoxaparin) 0 each 0     GLUCOSAMINE SULFATE PO Take 750 mLs by mouth daily       betamethasone dipropionate (DIPROSONE) 0.05 % cream Apply sparingly to affected area twice daily as needed.  Do not apply to face. 45 g 4     cycloSPORINE modified (GENGRAF) 25 MG capsule Take 3 capsules by mouth 2 times daily. 540 capsule 11     ORDER FOR DME Please dispense 2 pair of knee high compression 20-30 stockings 2 each 0     ferrous sulfate 325 (65 FE) MG tablet Take 1 tablet by mouth daily. 90 tablet 1     ACE NOT PRESCRIBED, INTENTIONAL, by Other route continuous prn.  0     ARB NOT PRESCRIBED, INTENTIONAL, by Other route continuous prn.  0     VITAMIN B-6 100 MG OR TABS OTC   2 tabs daily per transplant clinic       VITAMIN B-12 500 MCG OR TABS OTC    1 tab daily per transplant clinic       TUMS OR Take 2 tablets by mouth 3 times daily.       OMEGA 3 550 MG OR CAPS Take 2,000 mg by mouth 2 times daily.       AZATHIOPRINE 50 MG OR TABS Take three tablets by mouth daily 90 11       ALLERGIES     Allergies   Allergen Reactions     No Known Allergies        PAST MEDICAL HISTORY:  Past Medical History:   Diagnosis Date     Anemia, unspecified      Cervical cancer (H) 1972    Hysterectomy, still has ovaries     Compression fracture of lumbar vertebra (H) July  1996    L1 and L4? Happened while walking down stairs. Didn't fall.     CVA (cerebral infarction) Nov 1995 & Nov 1996    on coumadin     Esophageal reflux      Long-term (current) use of anticoagulants      Nonspecific abnormal unspecified cardiovascular function study      Osteoporosis, unspecified      Other specified disorder resulting from impaired renal function      Polycystic kidney, unspecified type      Unspecified essential hypertension        PAST SURGICAL HISTORY:  Past Surgical History:   Procedure Laterality Date     BIOPSY BREAST  1976 or 1977    left breast? No lump, bleeding from nipple     HYSTERECTOMY, PAP NO LONGER INDICATED  1972 or 1973    Hysterectomy due to cervical cancer     JOINT REPLACEMENT, HIP RT/LT      Left Hip Replacement     TRANSPLANT KIDNEY RECIPIENT LIVING RELATED  9/27/1995    Right side, both original kidneys left in place       FAMILY HISTORY:  Family History   Problem Relation Age of Onset     Arthritis Sister      rheumatoid     Asthma Sister      CANCER Sister 53     Non-Hodgkins lymphoma     KIDNEY DISEASE Sister      Polycystic kidney disease. Transplant age 50     KIDNEY DISEASE Sister      Polycystic kidney disease. Transplant age 51     Myocardial Infarction Mother 53     2nd MI age 56     CEREBROVASCULAR DISEASE Mother 53     Respiratory Father      Emphysema     Myocardial Infarction Maternal Grandmother      Hypertension Maternal Grandfather      KIDNEY DISEASE Maternal Grandfather      polycystic kidney disease     Anxiety Disorder Brother      KIDNEY DISEASE Daughter      Polycystic kidney disease       SOCIAL HISTORY:  Social History     Social History     Marital status:      Spouse name: N/A     Number of children: 2     Years of education: N/A     Occupational History     disability      was  for [employer]      Retired     Social History Main Topics     Smoking status: Former Smoker     Quit date: 1/1/1990     Smokeless tobacco: Never Used     Alcohol  use Yes      Comment: 2-3x per year     Drug use: No     Sexual activity: Yes     Partners: Male     Other Topics Concern     Parent/Sibling W/ Cabg, Mi Or Angioplasty Before 65f 55m? Yes     Social History Narrative       Review of Systems:  Skin:  Positive for     Eyes:  Positive for glasses;cataracts  ENT:  Negative    Respiratory:  Positive for shortness of breath;cough  Cardiovascular:    syncope or near-syncope;Positive for;lightheadedness;dizziness  Gastroenterology: Negative    Genitourinary:  Negative    Musculoskeletal:  Positive for back pain;joint pain  Neurologic:  Positive for stroke;numbness or tingling of feet  Psychiatric:  Negative    Heme/Lymph/Imm:  Negative    Endocrine:  Negative      Physical Exam:  Vitals: /70 (BP Location: Right arm, Patient Position: Chair, Cuff Size: Adult Large)  Pulse 63  Wt 112.1 kg (247 lb 1.6 oz)  SpO2 97%  BMI 42.41 kg/m2    Constitutional:  cooperative, alert and oriented, well developed, well nourished, in no acute distress obese      Skin:  warm and dry to the touch        Head:  normocephalic        Eyes:  no xanthalasma        ENT:  no pallor or cyanosis, dentition good        Neck:  carotid pulses are full and equal bilaterally, JVP normal, no carotid bruit        Chest:  normal breath sounds, clear to auscultation, normal A-P diameter, normal symmetry, normal respiratory excursion, no use of accessory muscles        Cardiac: regular rhythm;normal S1 and S2;no S3 or S4;apical impulse not displaced       systolic murmur;grade 1          Abdomen:  abdomen soft, non-tender, BS normoactive, no mass, no HSM, no bruits        Vascular:                                   Patient is wearing thick compression stockings, not removed, pulses not palpable through these.  Radial pulses normal bilaterally.    Extremities and Back:           Neurological:  no gross motor deficits          Recent Lab Results:  LIPID RESULTS:  Lab Results   Component Value Date    CHOL  152 09/15/2017    HDL 86 09/15/2017    LDL 45 09/15/2017    TRIG 105 09/15/2017    CHOLHDLRATIO 2.0 02/06/2015       LIVER ENZYME RESULTS:  Lab Results   Component Value Date    AST 29 01/25/2018    ALT 21 01/25/2018       CBC RESULTS:  Lab Results   Component Value Date    WBC 6.2 02/24/2018    RBC 4.27 02/24/2018    HGB 13.2 02/24/2018    HCT 39.7 02/24/2018    MCV 93 02/24/2018    MCH 30.9 02/24/2018    MCHC 33.2 02/24/2018    RDW 14.1 02/24/2018     (L) 02/24/2018       BMP RESULTS:  Lab Results   Component Value Date     02/24/2018    POTASSIUM 3.4 02/24/2018    CHLORIDE 105 02/24/2018    CO2 30 02/24/2018    ANIONGAP 6 02/24/2018     (H) 02/24/2018    BUN 33 (H) 02/24/2018    CR 1.67 (H) 02/24/2018    GFRESTIMATED 30 (L) 02/24/2018    GFRESTBLACK 36 (L) 02/24/2018    KRISTINA 9.4 02/24/2018        A1C RESULTS:  Lab Results   Component Value Date    A1C 5.9 07/30/2015       INR RESULTS:  Lab Results   Component Value Date    INR 2.4 (A) 03/23/2018    INR 3.2 (A) 03/09/2018    INR 3.11 (H) 03/03/2018    INR 8.29 (HH) 03/02/2018         Salomón Minaya MD, [facility]    CC  No referring provider defined for this encounter.

## 2018-03-28 NOTE — LETTER
3/28/2018    Cyn Crow MD  [address] [place] [address]    RE: Elise Mason       Dear Colleague,    I had the pleasure of seeing Elise Mason in the [facility].    HISTORY:    Elise Mason is a very pleasant 72-year-old female with a history of polycystic kidney disease, status post kidney transplant in 1995, CKD, hypertension, history of CVA on chronic warfarin, anemia, GERD, cervical cancer status post hysterectomy, and lumbar compression fractions.  She struggles with obesity as well.  She was asked to see cardiology today because of a recent syncopal episode and to review the results of her Zio patch Holter.    The patient lives in an independent living home and has meals served in the dining room.  She was experiencing a normal day on February 23 and sat down for dinner feeling fine.  She was with her  and suddenly passed out.  She does not recall having any symptoms prior to this.  Per the chart and her 's recollection she was unresponsive for about 90 seconds with no loss of bowel or bladder control.  There was no seizure activity visible.  When she woke up she was briefly confused.  An ambulance was summoned and because she was in a dining room she walked across the room to get into the stretcher.  This is the only episode of syncope she has ever experienced.  She spent the night in the hospital and went home the next day.  When she arrived home she had a small lunch and felt very nauseated with episodes of vomiting followed by 3 days of diarrhea.  A 14 day monitor was arranged.    The 14 day monitor showed several brief episodes of asymptomatic ventricular tachycardia, the longest being 4 beats at 189 bpm.  She also had a longer run of ventricular arrhythmias and a rate of just 100, which would be an idioventricular rhythm rather than actual ventricular tachycardia.  In addition there were several runs of  supraventricular ectopy labeled as SVT but a rate of only about 100 and completely asymptomatic.  Review of the strips show that these may be very transient atrial fibrillation.    The patient describes occasional episodes of dizziness if she pushes herself too hard walking.  She now uses a walker and has not had these episodes, probably because she goes slower.  She also describes some intermittent aching substernal chest discomfort which is exercise induced.  Typically takes a few deep breaths and this discomfort resolves within a couple of minutes.  The discomfort does not radiate elsewhere and she does not have nausea or diaphoresis.  The discomfort is associated with significant dyspnea.    The patient has a history of smoking which she quit in 1990 after approximately a 50-pack-year smoking history.  She had pulmonary function tests showing a mild ventilatory defect, air trapping, and small airways showing a positive response to bronchodilator.  She does find herself getting short of breath with activity and uses a inhaler which she finds helpful.    Cardiac risk factors include the history of smoking listed above, she is glucose intolerant but not diabetic.  She has a history of treated hypertension and treated hypercholesterolemia.  She also has a relatively strong family history of coronary artery disease with her mother having an MI at age 50 followed by a stroke and then dying at age 53.  Her sister had an MI in her early 50s as well.    Chunky reports that she has not had problems with strokelike symptoms recently, orthostasis, any sense of palpitations, symptoms of PND/orthopnea, significant peripheral edema, or claudication symptoms.    An echocardiogram was done which demonstrated borderline LVH, evidence for diastolic dysfunction, but normal ejection fraction without regional wall motion abnormalities.        ASSESSMENT/PLAN:    1.  Syncope.  Unclear etiology, no warning symptoms but the next day  she developed gastrointestinal problems.  This may have been third spacing of fluid or vasovagal syncope.  Cardiac etiology is possible, see below.  2.  Chest discomfort.  Suspicious for angina, multiple cardiac risk factors, will arrange nuclear stress test to evaluate.  3.  Increased ventricular arrhythmia.  The Holter monitor shows some brief episodes of asymptomatic ventricular tachycardia.  This may have been the cause of her syncope.  It may be related to coronary artery disease.  It is also very possible that her syncope was vasovagal.  The ventricular arrhythmia leads me to be more concerned that she may have coronary disease so we will follow up that pathway at this time.  4.  Hypertension.  Adequate control, continue diltiazem, felodipine, and clonidine.    Thank you for asking me to participate in the care of your delightful patient.  Please do not hesitate to call if I can be of further assistance.      Orders Placed This Encounter   Procedures     NM Lexiscan stress test (nuc card)     TSH     Magnesium     No orders of the defined types were placed in this encounter.    There are no discontinued medications.      Encounter Diagnoses   Name Primary?     Syncope, unspecified syncope type Yes     Stable angina pectoris (H)      Paroxysmal ventricular tachycardia (H)        CURRENT MEDICATIONS:  Current Outpatient Prescriptions   Medication Sig Dispense Refill     warfarin (COUMADIN) 2.5 MG tablet Take 5 mg on Mon, Wed, Fri; 2.5 mg all other days or as directed by INR clinic 115 tablet 1     amoxicillin (AMOXIL) 500 MG capsule Take 1 capsule (500 mg) by mouth daily as needed 4 capsule 1     alendronate (FOSAMAX) 70 MG tablet Take 1 tablet (70 mg) by mouth once a week 12 tablet 2     albuterol (PROAIR HFA/PROVENTIL HFA/VENTOLIN HFA) 108 (90 BASE) MCG/ACT Inhaler Inhale 2 puffs into the lungs every 4 hours as needed for shortness of breath / dyspnea or wheezing 1 Inhaler 0     simvastatin (ZOCOR) 20 MG  tablet Take 1 tablet (20 mg) by mouth At Bedtime 90 tablet 2     calcitRIOL (ROCALTROL) 0.5 MCG capsule Take 1 capsule (0.5 mcg) by mouth daily 90 capsule 2     sulfamethoxazole-trimethoprim (BACTRIM) 400-80 MG per tablet Take 1 tablet by mouth daily 90 tablet 2     diltiazem (TIAZAC) 240 MG 24 hr ER beaded capsule Take 1 capsule (240 mg) by mouth daily 90 capsule 2     cloNIDine (CATAPRES) 0.2 MG tablet Take 1 tablet (0.2 mg) by mouth 2 times daily 180 tablet 2     furosemide (LASIX) 80 MG tablet Take 1 tablet (80 mg) by mouth 2 times daily 180 tablet 2     folic acid (FOLVITE) 1 MG tablet TAKE 2 TABLETS (2,000 MCG) BY MOUTH DAILY 180 tablet 0     felodipine ER (PLENDIL) 2.5 MG 24 hr tablet TAKE 1 TABLET (2.5 MG) BY MOUTH DAILY 90 tablet 0     ASPIRIN NOT PRESCRIBED, INTENTIONAL, 1 each continuous prn for other Antiplatelet medication not prescribed intentionally due to Current anticoagulant therapy (warfarin/enoxaparin) 0 each 0     GLUCOSAMINE SULFATE PO Take 750 mLs by mouth daily       betamethasone dipropionate (DIPROSONE) 0.05 % cream Apply sparingly to affected area twice daily as needed.  Do not apply to face. 45 g 4     cycloSPORINE modified (GENGRAF) 25 MG capsule Take 3 capsules by mouth 2 times daily. 540 capsule 11     ORDER FOR DME Please dispense 2 pair of knee high compression 20-30 stockings 2 each 0     ferrous sulfate 325 (65 FE) MG tablet Take 1 tablet by mouth daily. 90 tablet 1     ACE NOT PRESCRIBED, INTENTIONAL, by Other route continuous prn.  0     ARB NOT PRESCRIBED, INTENTIONAL, by Other route continuous prn.  0     VITAMIN B-6 100 MG OR TABS OTC   2 tabs daily per transplant clinic       VITAMIN B-12 500 MCG OR TABS OTC    1 tab daily per transplant clinic       TUMS OR Take 2 tablets by mouth 3 times daily.       OMEGA 3 550 MG OR CAPS Take 2,000 mg by mouth 2 times daily.       AZATHIOPRINE 50 MG OR TABS Take three tablets by mouth daily 90 11       ALLERGIES     Allergies   Allergen  Reactions     No Known Allergies        PAST MEDICAL HISTORY:  Past Medical History:   Diagnosis Date     Anemia, unspecified      Cervical cancer (H) 1972    Hysterectomy, still has ovaries     Compression fracture of lumbar vertebra (H) July 1996    L1 and L4? Happened while walking down stairs. Didn't fall.     CVA (cerebral infarction) Nov 1995 & Nov 1996    on coumadin     Esophageal reflux      Long-term (current) use of anticoagulants      Nonspecific abnormal unspecified cardiovascular function study      Osteoporosis, unspecified      Other specified disorder resulting from impaired renal function      Polycystic kidney, unspecified type      Unspecified essential hypertension        PAST SURGICAL HISTORY:  Past Surgical History:   Procedure Laterality Date     BIOPSY BREAST  1976 or 1977    left breast? No lump, bleeding from nipple     HYSTERECTOMY, PAP NO LONGER INDICATED  1972 or 1973    Hysterectomy due to cervical cancer     JOINT REPLACEMENT, HIP RT/LT      Left Hip Replacement     TRANSPLANT KIDNEY RECIPIENT LIVING RELATED  9/27/1995    Right side, both original kidneys left in place       FAMILY HISTORY:  Family History   Problem Relation Age of Onset     Arthritis Sister      rheumatoid     Asthma Sister      CANCER Sister 53     Non-Hodgkins lymphoma     KIDNEY DISEASE Sister      Polycystic kidney disease. Transplant age 50     KIDNEY DISEASE Sister      Polycystic kidney disease. Transplant age 51     Myocardial Infarction Mother 53     2nd MI age 56     CEREBROVASCULAR DISEASE Mother 53     Respiratory Father      Emphysema     Myocardial Infarction Maternal Grandmother      Hypertension Maternal Grandfather      KIDNEY DISEASE Maternal Grandfather      polycystic kidney disease     Anxiety Disorder Brother      KIDNEY DISEASE Daughter      Polycystic kidney disease       SOCIAL HISTORY:  Social History     Social History     Marital status:      Spouse name: N/A     Number of children:  2     Years of education: N/A     Occupational History     disability      was  for [employer]      Retired     Social History Main Topics     Smoking status: Former Smoker     Quit date: 1/1/1990     Smokeless tobacco: Never Used     Alcohol use Yes      Comment: 2-3x per year     Drug use: No     Sexual activity: Yes     Partners: Male     Other Topics Concern     Parent/Sibling W/ Cabg, Mi Or Angioplasty Before 65f 55m? Yes     Social History Narrative       Review of Systems:  Skin:  Positive for     Eyes:  Positive for glasses;cataracts  ENT:  Negative    Respiratory:  Positive for shortness of breath;cough  Cardiovascular:    syncope or near-syncope;Positive for;lightheadedness;dizziness  Gastroenterology: Negative    Genitourinary:  Negative    Musculoskeletal:  Positive for back pain;joint pain  Neurologic:  Positive for stroke;numbness or tingling of feet  Psychiatric:  Negative    Heme/Lymph/Imm:  Negative    Endocrine:  Negative      Physical Exam:  Vitals: /70 (BP Location: Right arm, Patient Position: Chair, Cuff Size: Adult Large)  Pulse 63  Wt 112.1 kg (247 lb 1.6 oz)  SpO2 97%  BMI 42.41 kg/m2    Constitutional:  cooperative, alert and oriented, well developed, well nourished, in no acute distress obese      Skin:  warm and dry to the touch        Head:  normocephalic        Eyes:  no xanthalasma        ENT:  no pallor or cyanosis, dentition good        Neck:  carotid pulses are full and equal bilaterally, JVP normal, no carotid bruit        Chest:  normal breath sounds, clear to auscultation, normal A-P diameter, normal symmetry, normal respiratory excursion, no use of accessory muscles        Cardiac: regular rhythm;normal S1 and S2;no S3 or S4;apical impulse not displaced       systolic murmur;grade 1          Abdomen:  abdomen soft, non-tender, BS normoactive, no mass, no HSM, no bruits        Vascular:                                   Patient is wearing thick compression stockings,  not removed, pulses not palpable through these.  Radial pulses normal bilaterally.    Extremities and Back:           Neurological:  no gross motor deficits          Recent Lab Results:  LIPID RESULTS:  Lab Results   Component Value Date    CHOL 152 09/15/2017    HDL 86 09/15/2017    LDL 45 09/15/2017    TRIG 105 09/15/2017    CHOLHDLRATIO 2.0 02/06/2015       LIVER ENZYME RESULTS:  Lab Results   Component Value Date    AST 29 01/25/2018    ALT 21 01/25/2018       CBC RESULTS:  Lab Results   Component Value Date    WBC 6.2 02/24/2018    RBC 4.27 02/24/2018    HGB 13.2 02/24/2018    HCT 39.7 02/24/2018    MCV 93 02/24/2018    MCH 30.9 02/24/2018    MCHC 33.2 02/24/2018    RDW 14.1 02/24/2018     (L) 02/24/2018       BMP RESULTS:  Lab Results   Component Value Date     02/24/2018    POTASSIUM 3.4 02/24/2018    CHLORIDE 105 02/24/2018    CO2 30 02/24/2018    ANIONGAP 6 02/24/2018     (H) 02/24/2018    BUN 33 (H) 02/24/2018    CR 1.67 (H) 02/24/2018    GFRESTIMATED 30 (L) 02/24/2018    GFRESTBLACK 36 (L) 02/24/2018    KRISTINA 9.4 02/24/2018        A1C RESULTS:  Lab Results   Component Value Date    A1C 5.9 07/30/2015       INR RESULTS:  Lab Results   Component Value Date    INR 2.4 (A) 03/23/2018    INR 3.2 (A) 03/09/2018    INR 3.11 (H) 03/03/2018    INR 8.29 (HH) 03/02/2018     Thank you for allowing me to participate in the care of your patient.    Sincerely,     Salomón Minaya MD     [facility]

## 2018-03-28 NOTE — MR AVS SNAPSHOT
After Visit Summary   3/28/2018    Elise Mason    MRN: [id number]           Patient Information     Date Of Birth          1945        Visit Information        Provider Department      3/28/2018 11:15 AM Salomón Minaya MD [facility]   [place]        Today's Diagnoses     Syncope, unspecified syncope type    -  1    Stable angina pectoris (H)        Paroxysmal ventricular tachycardia (H)           Follow-ups after your visit        Your next 10 appointments already scheduled     Mar 28, 2018 11:15 AM CDT   New Visit with Salomón [facility] MD   [facility] ([facility]    [address] [place] [address]  [address]  [facility] [address]   [phone]            May 04, 2018 10:00 AM CDT   Anticoagulation Visit with  ANTICOAGULATION CLINIC   [facility] [place] ([facility]])    [address] [place] [address]  [address]  [place] [address]   [phone]              Future tests that were ordered for you today     Open Future Orders        Priority Expected Expires Ordered    NM Lexiscan stress test (nuc card) Routine 4/4/2018 3/28/2019 3/28/2018    Magnesium Routine 3/28/2018 3/28/2019 3/28/2018    TSH Routine 3/28/2018 3/28/2019 3/28/2018            Who to contact     If you have questions or need follow up information about today's clinic visit or your schedule please contact [facility]   [place] directly at [phone].  Normal or non-critical lab and imaging results will be communicated to you by MyChart, letter or phone within 4 business days after the clinic has received the results. If you do not hear from us within 7 days, please contact the clinic through MyChart or phone. If you have a critical or abnormal lab result, we will notify you by phone as soon as possible.  Submit refill requests through [employer] or call your pharmacy and they will forward  the refill request to us. Please allow 3 business days for your refill to be completed.          Additional Information About Your Visit        [employer] Information     [employer] gives you secure access to your electronic health record. If you see a primary care provider, you can also send messages to your care team and make appointments. If you have questions, please call your primary care clinic.  If you do not have a primary care provider, please call [phone] and they will assist you.        Care EveryWhere ID     This is your Care EveryWhere ID. This could be used by other organizations to access your [facility] medical records  [id number]        Your Vitals Were     Pulse Pulse Oximetry BMI (Body Mass Index)             63 97% 42.41 kg/m2          Blood Pressure from Last 3 Encounters:   03/28/18 136/70   03/02/18 122/62   02/24/18 167/76    Weight from Last 3 Encounters:   03/28/18 112.1 kg (247 lb 1.6 oz)   03/02/18 110.1 kg (242 lb 11.2 oz)   02/23/18 110.9 kg (244 lb 8 oz)               Primary Care Provider Office Phone # Fax #    Cyn rCow [phone] [address] [place] [address]        Equal Access to Services     MAGDA LIRIANO AH: Hadii aad ku hadasho Soomaali, waaxda luqadaha, qaybta kaalmada adeegyada, bibi falkin haymiken cuong yee. So [facility] [phone].    ATENCIÓN: Si habla español, tiene a mehta disposición servicios gratuitos de asistencia lingüística. Llame al [phone].    We comply with applicable federal civil rights laws and [place] laws. We do not discriminate on the basis of race, color, national origin, age, disability, sex, sexual orientation, or gender identity.            Thank you!     Thank you for choosing [facility]  for your care. Our goal is always to provide you with excellent care. Hearing back from our patients is one way we can continue to improve our services. Please take a  few minutes to complete the written survey that you may receive in the mail after your visit with us. Thank you!             Your Updated Medication List - Protect others around you: Learn how to safely use, store and throw away your medicines at www.disposemymeds.org.          This list is accurate as of 3/28/18 10:32 AM.  Always use your most recent med list.                   Brand Name Dispense Instructions for use Diagnosis    * ACE NOT PRESCRIBED (INTENTIONAL)      by Other route continuous prn.    Polycystic kidney, unspecified type, Kidney replaced by transplant       albuterol 108 (90 BASE) MCG/ACT Inhaler    PROAIR HFA/PROVENTIL HFA/VENTOLIN HFA    1 Inhaler    Inhale 2 puffs into the lungs every 4 hours as needed for shortness of breath / dyspnea or wheezing    Dyspnea on exertion       alendronate 70 MG tablet    FOSAMAX    12 tablet    Take 1 tablet (70 mg) by mouth once a week    Age related osteoporosis, unspecified pathological fracture presence       amoxicillin 500 MG capsule    AMOXIL    4 capsule    Take 1 capsule (500 mg) by mouth daily as needed    Kidney replaced by transplant       * ARB NOT PRESCRIBED (INTENTIONAL)      by Other route continuous prn.    Polycystic kidney, unspecified type, Kidney replaced by transplant       ASPIRIN NOT PRESCRIBED    INTENTIONAL    0 each    1 each continuous prn for other Antiplatelet medication not prescribed intentionally due to Current anticoagulant therapy (warfarin/enoxaparin)    History of CVA (cerebrovascular accident)       azaTHIOprine 50 MG tablet    IMURAN    90    Take three tablets by mouth daily        betamethasone dipropionate 0.05 % cream    DIPROSONE    45 g    Apply sparingly to affected area twice daily as needed.  Do not apply to face.    Dermatitis       calcitRIOL 0.5 MCG capsule    ROCALTROL    90 capsule    Take 1 capsule (0.5 mcg) by mouth daily    Kidney replaced by transplant       cloNIDine 0.2 MG tablet    CATAPRES    180  tablet    Take 1 tablet (0.2 mg) by mouth 2 times daily    Kidney replaced by transplant, Benign essential hypertension       cyanocolbalamin 500 MCG tablet    vitamin  B-12     OTC    1 tab daily per transplant clinic        cycloSPORINE modified 25 MG capsule     540 capsule    Take 3 capsules by mouth 2 times daily.    Kidney replaced by transplant       diltiazem 240 MG 24 hr ER beaded capsule    TIAZAC    90 capsule    Take 1 capsule (240 mg) by mouth daily    Kidney replaced by transplant, Benign essential hypertension       felodipine ER 2.5 MG 24 hr tablet    PLENDIL    90 tablet    TAKE 1 TABLET (2.5 MG) BY MOUTH DAILY    Kidney replaced by transplant, Benign essential hypertension       ferrous sulfate 325 (65 FE) MG tablet    IRON    90 tablet    Take 1 tablet by mouth daily.    Kidney replaced by transplant       folic acid 1 MG tablet    FOLVITE    180 tablet    TAKE 2 TABLETS (2,000 MCG) BY MOUTH DAILY    Kidney replaced by transplant       furosemide 80 MG tablet    LASIX    180 tablet    Take 1 tablet (80 mg) by mouth 2 times daily    Kidney replaced by transplant       GLUCOSAMINE SULFATE PO      Take 750 mLs by mouth daily        OMEGA 3 550 MG Caps   Generic drug:  LINOLENIC ACID      Take 2,000 mg by mouth 2 times daily.        * order for DME     2 each    Please dispense 2 pair of knee high compression 20-30 stockings    Kidney replaced by transplant, Polycystic kidney, unspecified type, Edema       pyridoxine 100 MG tablet    VITAMIN B-6     OTC   2 tabs daily per transplant clinic        simvastatin 20 MG tablet    ZOCOR    90 tablet    Take 1 tablet (20 mg) by mouth At Bedtime    Pure hypercholesterolemia       sulfamethoxazole-trimethoprim 400-80 MG per tablet    BACTRIM    90 tablet    Take 1 tablet by mouth daily    Kidney replaced by transplant       TUMS PO      Take 2 tablets by mouth 3 times daily.        warfarin 2.5 MG tablet    COUMADIN    115 tablet    Take 5 mg on Mon, Wed, Fri;  2.5 mg all other days or as directed by INR clinic    Long-term (current) use of anticoagulants, History of CVA (cerebrovascular accident)       * Notice:  This list has 3 medication(s) that are the same as other medications prescribed for you. Read the directions carefully, and ask your doctor or other care provider to review them with you.

## 2018-03-29 LAB
MAGNESIUM SERPL-MCNC: 2.1 MG/DL (ref 1.6–2.3)
TSH SERPL DL<=0.005 MIU/L-ACNC: 2.58 MU/L (ref 0.4–4)

## 2018-04-04 ENCOUNTER — HOSPITAL ENCOUNTER (OUTPATIENT)
Dept: CARDIOLOGY | Facility: CLINIC | Age: 73
Discharge: HOME | End: 2018-04-04
Attending: INTERNAL MEDICINE | Admitting: INTERNAL MEDICINE
Payer: COMMERCIAL

## 2018-04-04 ENCOUNTER — HOSPITAL ENCOUNTER (OUTPATIENT)
Dept: NUCLEAR MEDICINE | Facility: CLINIC | Age: 73
Setting detail: NUCLEAR MEDICINE
End: 2018-04-04
Attending: INTERNAL MEDICINE
Payer: COMMERCIAL

## 2018-04-04 DIAGNOSIS — I20.89 STABLE ANGINA PECTORIS (H): ICD-10-CM

## 2018-04-04 DIAGNOSIS — R55 SYNCOPE, UNSPECIFIED SYNCOPE TYPE: ICD-10-CM

## 2018-04-04 DIAGNOSIS — I47.29 PAROXYSMAL VENTRICULAR TACHYCARDIA (H): ICD-10-CM

## 2018-04-04 PROCEDURE — 93017 CV STRESS TEST TRACING ONLY: CPT

## 2018-04-04 PROCEDURE — 93018 CV STRESS TEST I&R ONLY: CPT | Performed by: INTERNAL MEDICINE

## 2018-04-04 PROCEDURE — A9502 TC99M TETROFOSMIN: HCPCS | Performed by: INTERNAL MEDICINE

## 2018-04-04 PROCEDURE — 34300033 ZZH RX 343: Performed by: INTERNAL MEDICINE

## 2018-04-04 PROCEDURE — 93016 CV STRESS TEST SUPVJ ONLY: CPT | Performed by: INTERNAL MEDICINE

## 2018-04-04 PROCEDURE — 78452 HT MUSCLE IMAGE SPECT MULT: CPT

## 2018-04-04 PROCEDURE — 78452 HT MUSCLE IMAGE SPECT MULT: CPT | Mod: 26 | Performed by: INTERNAL MEDICINE

## 2018-04-04 PROCEDURE — 25000128 H RX IP 250 OP 636

## 2018-04-04 RX ORDER — REGADENOSON 0.08 MG/ML
INJECTION, SOLUTION INTRAVENOUS
Status: COMPLETED
Start: 2018-04-04 | End: 2018-04-04

## 2018-04-04 RX ADMIN — TETROFOSMIN 10.2 MCI.: 1.38 INJECTION, POWDER, LYOPHILIZED, FOR SOLUTION INTRAVENOUS at 10:16

## 2018-04-04 RX ADMIN — TETROFOSMIN 31.5 MCI.: 1.38 INJECTION, POWDER, LYOPHILIZED, FOR SOLUTION INTRAVENOUS at 11:48

## 2018-04-04 RX ADMIN — REGADENOSON 0.4 MG: 0.08 INJECTION, SOLUTION INTRAVENOUS at 11:39

## 2018-04-05 ENCOUNTER — TELEPHONE (OUTPATIENT)
Dept: CARDIOLOGY | Facility: CLINIC | Age: 73
End: 2018-04-05

## 2018-04-05 NOTE — TELEPHONE ENCOUNTER
NUC  Date performed: 04-04-18     Impression  1.  Myocardial perfusion imaging using single isotope technique  demonstrated small defect, mild intensity of the basal inferolateral  wall, suspicious for nontransmural infarct, mild simon-infarct  ischemia.   2. Gated images demonstrated normal wall motion.  The left ventricular  systolic function is 70% at rest, 66% with stress.   3. No previous nuclear stress for comparison.     Notes Recorded by Salomón Minaya MD on 4/4/2018 at 2:17 PM  Results of studies reviewed.  Echo shows no wall motion abnormalities, EF normal on nuc scan.  Pt has brief episodes of VT on monitor, has exertional chest pain, and has some ischemia on nuc scan.  She is post kidney transplant with GFR low (25-30).  I think she needs an angiogram (conserve contrast, no LV gram).  I think she told me she has had contrast studies in the past without problems.  ------      Results and recommendations were reviewed with patient over the phone.      Scheduled patient to see Dr. Minaya on Wed (04-11-18) in [place], to review study and do H+P for coronary angiogram.     Patient had no questions.     Marilee FAJARDO  [facility]

## 2018-04-11 ENCOUNTER — OFFICE VISIT (OUTPATIENT)
Dept: CARDIOLOGY | Facility: CLINIC | Age: 73
End: 2018-04-11
Payer: COMMERCIAL

## 2018-04-11 VITALS
BODY MASS INDEX: 42.36 KG/M2 | DIASTOLIC BLOOD PRESSURE: 60 MMHG | OXYGEN SATURATION: 96 % | WEIGHT: 246.8 LBS | SYSTOLIC BLOOD PRESSURE: 118 MMHG | HEART RATE: 69 BPM

## 2018-04-11 DIAGNOSIS — I25.118 CORONARY ARTERY DISEASE OF NATIVE ARTERY OF NATIVE HEART WITH STABLE ANGINA PECTORIS (H): Primary | ICD-10-CM

## 2018-04-11 PROCEDURE — 99213 OFFICE O/P EST LOW 20 MIN: CPT | Performed by: INTERNAL MEDICINE

## 2018-04-11 RX ORDER — NITROGLYCERIN 0.4 MG/1
TABLET SUBLINGUAL
Qty: 25 TABLET | Refills: 1 | Status: SHIPPED | OUTPATIENT
Start: 2018-04-11

## 2018-04-11 NOTE — MR AVS SNAPSHOT
After Visit Summary   4/11/2018    Elise Mason    MRN: [id number]           Patient Information     Date Of Birth          1945        Visit Information        Provider Department      4/11/2018 1:45 PM Salomón Minaya MD [facility]        Today's Diagnoses     Coronary artery disease of native artery of native heart with stable angina pectoris (H)    -  1       Follow-ups after your visit        Additional Services     Follow-Up with Cardiologist                 Your next 10 appointments already scheduled     May 04, 2018 10:00 AM CDT   Anticoagulation Visit with  ANTICOAGULATION CLINIC   [facility] ([facility])    [address]  [address]  [facility] [address]   [phone]              Future tests that were ordered for you today     Open Future Orders        Priority Expected Expires Ordered    Follow-Up with Cardiologist Routine 7/10/2018 4/11/2019 4/11/2018            Who to contact     If you have questions or need follow up information about today's clinic visit or your schedule please contact [facility] directly at [phone].  Normal or non-critical lab and imaging results will be communicated to you by SlidePayhart, letter or phone within 4 business days after the clinic has received the results. If you do not hear from us within 7 days, please contact the clinic through SlidePayhart or phone. If you have a critical or abnormal lab result, we will notify you by phone as soon as possible.  Submit refill requests through NWIX or call your pharmacy and they will forward the refill request to us. Please allow 3 business days for your refill to be completed.          Additional Information About Your Visit        MyChart Information     NWIX gives you secure access to your electronic health record. If you see a primary care provider, you can also send  messages to your care team and make appointments. If you have questions, please call your primary care clinic.  If you do not have a primary care provider, please call [phone] and they will assist you.        Care EveryWhere ID     This is your Care EveryWhere ID. This could be used by other organizations to access your [place] medical records  [id number]        Your Vitals Were     Pulse Pulse Oximetry BMI (Body Mass Index)             69 96% 42.36 kg/m2          Blood Pressure from Last 3 Encounters:   04/11/18 118/60   03/28/18 136/70   03/02/18 122/62    Weight from Last 3 Encounters:   04/11/18 246 lb 12.8 oz (111.9 kg)   03/28/18 247 lb 1.6 oz (112.1 kg)   03/02/18 242 lb 11.2 oz (110.1 kg)                 Today's Medication Changes          These changes are accurate as of 4/11/18  2:21 PM.  If you have any questions, ask your nurse or doctor.               Start taking these medicines.        Dose/Directions    nitroGLYcerin 0.4 MG sublingual tablet   Commonly known as:  NITROSTAT   Used for:  Coronary artery disease of native artery of native heart with stable angina pectoris (H)   Started by:  Salomón Minaya MD        For chest pain place 1 tablet under the tongue every 5 minutes for 3 doses. If symptoms persist 5 minutes after 1st dose call 911.   Quantity:  25 tablet   Refills:  1            Where to get your medicines      These medications were sent to [facility] - [address] [address]     Phone:  [phone]     nitroGLYcerin 0.4 MG sublingual tablet                Primary Care Provider Office Phone # Fax #    Cyn Crow [phone] [address] [place] [address]        Equal Access to Services     MAGDA LIRIANO AH: Jennifer Hernandez, francis charles, bibi weaver. So [facility]  [phone].    ATENCIÓN: Si nette raines, tiene a mehta disposición servicios gratuitos de asistencia lingüística. Camille rosales [phone].    We comply with applicable federal civil rights laws and [place] laws. We do not discriminate on the basis of race, color, national origin, age, disability, sex, sexual orientation, or gender identity.            Thank you!     Thank you for choosing [facility]   [facility]  for your care. Our goal is always to provide you with excellent care. Hearing back from our patients is one way we can continue to improve our services. Please take a few minutes to complete the written survey that you may receive in the mail after your visit with us. Thank you!             Your Updated Medication List - Protect others around you: Learn how to safely use, store and throw away your medicines at www.disposemymeds.org.          This list is accurate as of 4/11/18  2:21 PM.  Always use your most recent med list.                   Brand Name Dispense Instructions for use Diagnosis    * ACE NOT PRESCRIBED (INTENTIONAL)      by Other route continuous prn.    Polycystic kidney, unspecified type, Kidney replaced by transplant       albuterol 108 (90 Base) MCG/ACT Inhaler    PROAIR HFA/PROVENTIL HFA/VENTOLIN HFA    1 Inhaler    Inhale 2 puffs into the lungs every 4 hours as needed for shortness of breath / dyspnea or wheezing    Dyspnea on exertion       alendronate 70 MG tablet    FOSAMAX    12 tablet    Take 1 tablet (70 mg) by mouth once a week    Age related osteoporosis, unspecified pathological fracture presence       amoxicillin 500 MG capsule    AMOXIL    4 capsule    Take 1 capsule (500 mg) by mouth daily as needed    Kidney replaced by transplant       * ARB NOT PRESCRIBED (INTENTIONAL)      by Other route continuous prn.    Polycystic kidney, unspecified type, Kidney replaced by transplant       ASPIRIN NOT PRESCRIBED    INTENTIONAL    0 each    1 each continuous  prn for other Antiplatelet medication not prescribed intentionally due to Current anticoagulant therapy (warfarin/enoxaparin)    History of CVA (cerebrovascular accident)       azaTHIOprine 50 MG tablet    IMURAN    90    Take three tablets by mouth daily        betamethasone dipropionate 0.05 % cream    DIPROSONE    45 g    Apply sparingly to affected area twice daily as needed.  Do not apply to face.    Dermatitis       calcitRIOL 0.5 MCG capsule    ROCALTROL    90 capsule    Take 1 capsule (0.5 mcg) by mouth daily    Kidney replaced by transplant       cloNIDine 0.2 MG tablet    CATAPRES    180 tablet    Take 1 tablet (0.2 mg) by mouth 2 times daily    Kidney replaced by transplant, Benign essential hypertension       cyanocolbalamin 500 MCG tablet    vitamin  B-12     OTC    1 tab daily per transplant clinic        cycloSPORINE modified 25 MG capsule     540 capsule    Take 3 capsules by mouth 2 times daily.    Kidney replaced by transplant       diltiazem 240 MG 24 hr ER beaded capsule    TIAZAC    90 capsule    Take 1 capsule (240 mg) by mouth daily    Kidney replaced by transplant, Benign essential hypertension       felodipine ER 2.5 MG 24 hr tablet    PLENDIL    90 tablet    TAKE 1 TABLET (2.5 MG) BY MOUTH DAILY    Kidney replaced by transplant, Benign essential hypertension       ferrous sulfate 325 (65 Fe) MG tablet    IRON    90 tablet    Take 1 tablet by mouth daily.    Kidney replaced by transplant       folic acid 1 MG tablet    FOLVITE    180 tablet    TAKE 2 TABLETS (2,000 MCG) BY MOUTH DAILY    Kidney replaced by transplant       furosemide 80 MG tablet    LASIX    180 tablet    Take 1 tablet (80 mg) by mouth 2 times daily    Kidney replaced by transplant       GLUCOSAMINE SULFATE PO      Take 750 mLs by mouth daily        nitroGLYcerin 0.4 MG sublingual tablet    NITROSTAT    25 tablet    For chest pain place 1 tablet under the tongue every 5 minutes for 3 doses. If symptoms persist 5 minutes  after 1st dose call 911.    Coronary artery disease of native artery of native heart with stable angina pectoris (H)       OMEGA 3 550 MG Caps   Generic drug:  LINOLENIC ACID      Take 2,000 mg by mouth 2 times daily.        * order for DME     2 each    Please dispense 2 pair of knee high compression 20-30 stockings    Kidney replaced by transplant, Polycystic kidney, unspecified type, Edema       pyridoxine 100 MG tablet    VITAMIN B-6     OTC   2 tabs daily per transplant clinic        simvastatin 20 MG tablet    ZOCOR    90 tablet    Take 1 tablet (20 mg) by mouth At Bedtime    Pure hypercholesterolemia       sulfamethoxazole-trimethoprim 400-80 MG per tablet    BACTRIM    90 tablet    Take 1 tablet by mouth daily    Kidney replaced by transplant       TUMS PO      Take 2 tablets by mouth 3 times daily.        warfarin 2.5 MG tablet    COUMADIN    115 tablet    Take 5 mg on Mon, Wed, Fri; 2.5 mg all other days or as directed by INR clinic    Long-term (current) use of anticoagulants, History of CVA (cerebrovascular accident)       * Notice:  This list has 3 medication(s) that are the same as other medications prescribed for you. Read the directions carefully, and ask your doctor or other care provider to review them with you.

## 2018-04-11 NOTE — PROGRESS NOTES
HISTORY:    Elise Mason is a very pleasant 72-year-old female with a history of polycystic kidney disease, status post kidney transplant in 1995, CKD, hypertension, history of CVA on chronic warfarin, anemia, GERD, cervical cancer status post hysterectomy, and lumbar compression fractions.  She struggles with obesity as well.  She was recently seen in cardiology clinic for follow up of a single syncopal spell.    The patient had a 14 day monitor done which showed some very brief episodes of ventricular arrhythmias, the longest being a 4 beat run at 189 bpm.  She reported to me that she has occasional episodes of dizziness if she pushes herself too hard and had been experiencing some chest discomfort described as an aching sensation substernally when she exercised.  That discomfort did not radiate elsewhere nor was it associated nausea or diaphoresis but it was associated with dyspnea.    A nuclear stress test was completed which demonstrated a small fixed inferior defect with some mild simon-infarct ischemia.  This suggested a previous small MI, there were no associated wall motion abnormalities either on the nuclear stress test or on an echocardiogram.  Her ECG does not suggest previous inferior MI and there is certainly no documentation of coronary disease.    Today Elise reports that she has had no further episodes of chest discomfort since she last saw me.  She has been using her walker regularly and finds that she stands more upright, takes longer steps, and walks faster but is not as short of breath and does not have any further chest discomfort episodes.      ASSESSMENT/PLAN:    1.  Our discussion today centered almost completely on her nuclear stress test.  This is actually a low risk study with just a small area of suspected ischemia.  In fact I would not be surprised if this were a false positive since there are no associated ECG changes or wall motion abnormalities to suggest previous infarct.  In  any case, with her history of kidney transplant I think there is additional risk to coronary angiography.  We talked about these risks and potential benefits.  I believe it is safe at this point to take a conservative approach, using medical therapy and watchful waiting.  If she continues to have further episodes of chest discomfort we can reconsider having an angiogram, but at this point I think the risks exceed the benefits.  In addition, it is reassuring that she has not been having any other episodes of chest discomfort.  This decision was arrived at in a joint manner with the patient, her daughter, and myself.  I reviewed her medications and have added nitroglycerin to her regimen with instructions on its use.  She has been intolerant of beta-blockers in the past so I did not add this class of medications.    Thank you for allowing me to participate in your patient's care.  I have instructed her to call me if she has further chest discomfort and have arranged a 3 month follow-up visit.  Please do not hesitate to call if there are further questions or concerns.  20 minutes face-to-face time today, much greater than 50% counseling.    Orders Placed This Encounter   Procedures     Follow-Up with Cardiologist     Orders Placed This Encounter   Medications     nitroGLYcerin (NITROSTAT) 0.4 MG sublingual tablet     Sig: For chest pain place 1 tablet under the tongue every 5 minutes for 3 doses. If symptoms persist 5 minutes after 1st dose call 911.     Dispense:  25 tablet     Refill:  1     There are no discontinued medications.      Encounter Diagnosis   Name Primary?     Coronary artery disease of native artery of native heart with stable angina pectoris (H) Yes       CURRENT MEDICATIONS:  Current Outpatient Prescriptions   Medication Sig Dispense Refill     nitroGLYcerin (NITROSTAT) 0.4 MG sublingual tablet For chest pain place 1 tablet under the tongue every 5 minutes for 3 doses. If symptoms persist 5 minutes  after 1st dose call 911. 25 tablet 1     warfarin (COUMADIN) 2.5 MG tablet Take 5 mg on Mon, Wed, Fri; 2.5 mg all other days or as directed by INR clinic 115 tablet 1     amoxicillin (AMOXIL) 500 MG capsule Take 1 capsule (500 mg) by mouth daily as needed 4 capsule 1     alendronate (FOSAMAX) 70 MG tablet Take 1 tablet (70 mg) by mouth once a week 12 tablet 2     albuterol (PROAIR HFA/PROVENTIL HFA/VENTOLIN HFA) 108 (90 BASE) MCG/ACT Inhaler Inhale 2 puffs into the lungs every 4 hours as needed for shortness of breath / dyspnea or wheezing 1 Inhaler 0     simvastatin (ZOCOR) 20 MG tablet Take 1 tablet (20 mg) by mouth At Bedtime 90 tablet 2     calcitRIOL (ROCALTROL) 0.5 MCG capsule Take 1 capsule (0.5 mcg) by mouth daily 90 capsule 2     sulfamethoxazole-trimethoprim (BACTRIM) 400-80 MG per tablet Take 1 tablet by mouth daily 90 tablet 2     diltiazem (TIAZAC) 240 MG 24 hr ER beaded capsule Take 1 capsule (240 mg) by mouth daily 90 capsule 2     cloNIDine (CATAPRES) 0.2 MG tablet Take 1 tablet (0.2 mg) by mouth 2 times daily 180 tablet 2     furosemide (LASIX) 80 MG tablet Take 1 tablet (80 mg) by mouth 2 times daily 180 tablet 2     folic acid (FOLVITE) 1 MG tablet TAKE 2 TABLETS (2,000 MCG) BY MOUTH DAILY 180 tablet 0     felodipine ER (PLENDIL) 2.5 MG 24 hr tablet TAKE 1 TABLET (2.5 MG) BY MOUTH DAILY 90 tablet 0     ASPIRIN NOT PRESCRIBED, INTENTIONAL, 1 each continuous prn for other Antiplatelet medication not prescribed intentionally due to Current anticoagulant therapy (warfarin/enoxaparin) 0 each 0     GLUCOSAMINE SULFATE PO Take 750 mLs by mouth daily       betamethasone dipropionate (DIPROSONE) 0.05 % cream Apply sparingly to affected area twice daily as needed.  Do not apply to face. 45 g 4     cycloSPORINE modified (GENGRAF) 25 MG capsule Take 3 capsules by mouth 2 times daily. 540 capsule 11     ORDER FOR DME Please dispense 2 pair of knee high compression 20-30 stockings 2 each 0     ferrous sulfate  325 (65 FE) MG tablet Take 1 tablet by mouth daily. 90 tablet 1     ACE NOT PRESCRIBED, INTENTIONAL, by Other route continuous prn.  0     ARB NOT PRESCRIBED, INTENTIONAL, by Other route continuous prn.  0     VITAMIN B-6 100 MG OR TABS OTC   2 tabs daily per transplant clinic       VITAMIN B-12 500 MCG OR TABS OTC    1 tab daily per transplant clinic       TUMS OR Take 2 tablets by mouth 3 times daily.       OMEGA 3 550 MG OR CAPS Take 2,000 mg by mouth 2 times daily.       AZATHIOPRINE 50 MG OR TABS Take three tablets by mouth daily 90 11       ALLERGIES     Allergies   Allergen Reactions     No Known Allergies        PAST MEDICAL HISTORY:  Past Medical History:   Diagnosis Date     Anemia, unspecified      Cervical cancer (H) 1972    Hysterectomy, still has ovaries     Compression fracture of lumbar vertebra (H) July 1996    L1 and L4? Happened while walking down stairs. Didn't fall.     CVA (cerebral infarction) Nov 1995 & Nov 1996    on coumadin     Esophageal reflux      Long-term (current) use of anticoagulants      Nonspecific abnormal unspecified cardiovascular function study      Osteoporosis, unspecified      Other specified disorder resulting from impaired renal function      Polycystic kidney, unspecified type      Unspecified essential hypertension        PAST SURGICAL HISTORY:  Past Surgical History:   Procedure Laterality Date     BIOPSY BREAST  1976 or 1977    left breast? No lump, bleeding from nipple     HYSTERECTOMY, PAP NO LONGER INDICATED  1972 or 1973    Hysterectomy due to cervical cancer     JOINT REPLACEMENT, HIP RT/LT      Left Hip Replacement     TRANSPLANT KIDNEY RECIPIENT LIVING RELATED  9/27/1995    Right side, both original kidneys left in place       FAMILY HISTORY:  Family History   Problem Relation Age of Onset     Arthritis Sister      rheumatoid     Asthma Sister      CANCER Sister 53     Non-Hodgkins lymphoma     KIDNEY DISEASE Sister      Polycystic kidney disease. Transplant  age 50     KIDNEY DISEASE Sister      Polycystic kidney disease. Transplant age 51     Myocardial Infarction Mother 53     2nd MI age 56     CEREBROVASCULAR DISEASE Mother 53     Respiratory Father      Emphysema     Myocardial Infarction Maternal Grandmother      Hypertension Maternal Grandfather      KIDNEY DISEASE Maternal Grandfather      polycystic kidney disease     Anxiety Disorder Brother      KIDNEY DISEASE Daughter      Polycystic kidney disease       SOCIAL HISTORY:  Social History     Social History     Marital status:      Spouse name: N/A     Number of children: 2     Years of education: N/A     Occupational History     disability      was  for [employer]      Retired     Social History Main Topics     Smoking status: Former Smoker     Quit date: 1/1/1990     Smokeless tobacco: Never Used     Alcohol use Yes      Comment: 2-3x per year     Drug use: No     Sexual activity: Yes     Partners: Male     Other Topics Concern     Parent/Sibling W/ Cabg, Mi Or Angioplasty Before 65f 55m? Yes     Social History Narrative       Review of Systems:  Skin:  Positive for     Eyes:  Positive for glasses;cataracts  ENT:  Negative    Respiratory:  Positive for shortness of breath;cough  Cardiovascular:    syncope or near-syncope;Positive for;lightheadedness;dizziness  Gastroenterology: Negative    Genitourinary:  Negative    Musculoskeletal:  Positive for back pain;joint pain;arthritis  Neurologic:  Positive for stroke;numbness or tingling of feet  Psychiatric:  Negative    Heme/Lymph/Imm:  Negative    Endocrine:  Negative      Physical Exam:  Vitals: /60 (BP Location: Right arm, Patient Position: Chair, Cuff Size: Adult Large)  Pulse 69  Wt 111.9 kg (246 lb 12.8 oz)  SpO2 96%  BMI 42.36 kg/m2    Constitutional:           Skin:           Head:           Eyes:           ENT:           Neck:           Chest:           Cardiac:                    Abdomen:           Vascular:                                         Extremities and Back:           Neurological:             Recent Lab Results:  LIPID RESULTS:  Lab Results   Component Value Date    CHOL 152 09/15/2017    HDL 86 09/15/2017    LDL 45 09/15/2017    TRIG 105 09/15/2017    CHOLHDLRATIO 2.0 02/06/2015       LIVER ENZYME RESULTS:  Lab Results   Component Value Date    AST 29 01/25/2018    ALT 21 01/25/2018       CBC RESULTS:  Lab Results   Component Value Date    WBC 6.2 02/24/2018    RBC 4.27 02/24/2018    HGB 13.2 02/24/2018    HCT 39.7 02/24/2018    MCV 93 02/24/2018    MCH 30.9 02/24/2018    MCHC 33.2 02/24/2018    RDW 14.1 02/24/2018     (L) 02/24/2018       BMP RESULTS:  Lab Results   Component Value Date     02/24/2018    POTASSIUM 3.4 02/24/2018    CHLORIDE 105 02/24/2018    CO2 30 02/24/2018    ANIONGAP 6 02/24/2018     (H) 02/24/2018    BUN 33 (H) 02/24/2018    CR 1.67 (H) 02/24/2018    GFRESTIMATED 30 (L) 02/24/2018    GFRESTBLACK 36 (L) 02/24/2018    KRISTINA 9.4 02/24/2018        A1C RESULTS:  Lab Results   Component Value Date    A1C 5.9 07/30/2015       INR RESULTS:  Lab Results   Component Value Date    INR 2.4 (A) 03/23/2018    INR 3.2 (A) 03/09/2018    INR 3.11 (H) 03/03/2018    INR 8.29 (HH) 03/02/2018         Salomón Minaya MD, [facility]    CC  No referring provider defined for this encounter.

## 2018-04-11 NOTE — LETTER
4/11/2018    Cyn Crow MD  [address] [place] [address]    RE: Elise Mason       Dear Colleague,    I had the pleasure of seeing Elise Mason in the [facility].    HISTORY:    Elise Mason is a very pleasant 72-year-old female with a history of polycystic kidney disease, status post kidney transplant in 1995, CKD, hypertension, history of CVA on chronic warfarin, anemia, GERD, cervical cancer status post hysterectomy, and lumbar compression fractions.  She struggles with obesity as well.  She was recently seen in cardiology clinic for follow up of a single syncopal spell.    The patient had a 14 day monitor done which showed some very brief episodes of ventricular arrhythmias, the longest being a 4 beat run at 189 bpm.  She reported to me that she has occasional episodes of dizziness if she pushes herself too hard and had been experiencing some chest discomfort described as an aching sensation substernally when she exercised.  That discomfort did not radiate elsewhere nor was it associated nausea or diaphoresis but it was associated with dyspnea.    A nuclear stress test was completed which demonstrated a small fixed inferior defect with some mild simon-infarct ischemia.  This suggested a previous small MI, there were no associated wall motion abnormalities either on the nuclear stress test or on an echocardiogram.  Her ECG does not suggest previous inferior MI and there is certainly no documentation of coronary disease.    Today Elise reports that she has had no further episodes of chest discomfort since she last saw me.  She has been using her walker regularly and finds that she stands more upright, takes longer steps, and walks faster but is not as short of breath and does not have any further chest discomfort episodes.      ASSESSMENT/PLAN:    1.  Our discussion today centered almost completely on her nuclear stress test.  This is  actually a low risk study with just a small area of suspected ischemia.  In fact I would not be surprised if this were a false positive since there are no associated ECG changes or wall motion abnormalities to suggest previous infarct.  In any case, with her history of kidney transplant I think there is additional risk to coronary angiography.  We talked about these risks and potential benefits.  I believe it is safe at this point to take a conservative approach, using medical therapy and watchful waiting.  If she continues to have further episodes of chest discomfort we can reconsider having an angiogram, but at this point I think the risks exceed the benefits.  In addition, it is reassuring that she has not been having any other episodes of chest discomfort.  This decision was arrived at in a joint manner with the patient, her daughter, and myself.  I reviewed her medications and have added nitroglycerin to her regimen with instructions on its use.  She has been intolerant of beta-blockers in the past so I did not add this class of medications.    Thank you for allowing me to participate in your patient's care.  I have instructed her to call me if she has further chest discomfort and have arranged a 3 month follow-up visit.  Please do not hesitate to call if there are further questions or concerns.  20 minutes face-to-face time today, much greater than 50% counseling.    Orders Placed This Encounter   Procedures     Follow-Up with Cardiologist     Orders Placed This Encounter   Medications     nitroGLYcerin (NITROSTAT) 0.4 MG sublingual tablet     Sig: For chest pain place 1 tablet under the tongue every 5 minutes for 3 doses. If symptoms persist 5 minutes after 1st dose call 911.     Dispense:  25 tablet     Refill:  1     There are no discontinued medications.      Encounter Diagnosis   Name Primary?     Coronary artery disease of native artery of native heart with stable angina pectoris (H) Yes       CURRENT  MEDICATIONS:  Current Outpatient Prescriptions   Medication Sig Dispense Refill     nitroGLYcerin (NITROSTAT) 0.4 MG sublingual tablet For chest pain place 1 tablet under the tongue every 5 minutes for 3 doses. If symptoms persist 5 minutes after 1st dose call 911. 25 tablet 1     warfarin (COUMADIN) 2.5 MG tablet Take 5 mg on Mon, Wed, Fri; 2.5 mg all other days or as directed by INR clinic 115 tablet 1     amoxicillin (AMOXIL) 500 MG capsule Take 1 capsule (500 mg) by mouth daily as needed 4 capsule 1     alendronate (FOSAMAX) 70 MG tablet Take 1 tablet (70 mg) by mouth once a week 12 tablet 2     albuterol (PROAIR HFA/PROVENTIL HFA/VENTOLIN HFA) 108 (90 BASE) MCG/ACT Inhaler Inhale 2 puffs into the lungs every 4 hours as needed for shortness of breath / dyspnea or wheezing 1 Inhaler 0     simvastatin (ZOCOR) 20 MG tablet Take 1 tablet (20 mg) by mouth At Bedtime 90 tablet 2     calcitRIOL (ROCALTROL) 0.5 MCG capsule Take 1 capsule (0.5 mcg) by mouth daily 90 capsule 2     sulfamethoxazole-trimethoprim (BACTRIM) 400-80 MG per tablet Take 1 tablet by mouth daily 90 tablet 2     diltiazem (TIAZAC) 240 MG 24 hr ER beaded capsule Take 1 capsule (240 mg) by mouth daily 90 capsule 2     cloNIDine (CATAPRES) 0.2 MG tablet Take 1 tablet (0.2 mg) by mouth 2 times daily 180 tablet 2     furosemide (LASIX) 80 MG tablet Take 1 tablet (80 mg) by mouth 2 times daily 180 tablet 2     folic acid (FOLVITE) 1 MG tablet TAKE 2 TABLETS (2,000 MCG) BY MOUTH DAILY 180 tablet 0     felodipine ER (PLENDIL) 2.5 MG 24 hr tablet TAKE 1 TABLET (2.5 MG) BY MOUTH DAILY 90 tablet 0     ASPIRIN NOT PRESCRIBED, INTENTIONAL, 1 each continuous prn for other Antiplatelet medication not prescribed intentionally due to Current anticoagulant therapy (warfarin/enoxaparin) 0 each 0     GLUCOSAMINE SULFATE PO Take 750 mLs by mouth daily       betamethasone dipropionate (DIPROSONE) 0.05 % cream Apply sparingly to affected area twice daily as needed.  Do  not apply to face. 45 g 4     cycloSPORINE modified (GENGRAF) 25 MG capsule Take 3 capsules by mouth 2 times daily. 540 capsule 11     ORDER FOR DME Please dispense 2 pair of knee high compression 20-30 stockings 2 each 0     ferrous sulfate 325 (65 FE) MG tablet Take 1 tablet by mouth daily. 90 tablet 1     ACE NOT PRESCRIBED, INTENTIONAL, by Other route continuous prn.  0     ARB NOT PRESCRIBED, INTENTIONAL, by Other route continuous prn.  0     VITAMIN B-6 100 MG OR TABS OTC   2 tabs daily per transplant clinic       VITAMIN B-12 500 MCG OR TABS OTC    1 tab daily per transplant clinic       TUMS OR Take 2 tablets by mouth 3 times daily.       OMEGA 3 550 MG OR CAPS Take 2,000 mg by mouth 2 times daily.       AZATHIOPRINE 50 MG OR TABS Take three tablets by mouth daily 90 11       ALLERGIES     Allergies   Allergen Reactions     No Known Allergies        PAST MEDICAL HISTORY:  Past Medical History:   Diagnosis Date     Anemia, unspecified      Cervical cancer (H) 1972    Hysterectomy, still has ovaries     Compression fracture of lumbar vertebra (H) July 1996    L1 and L4? Happened while walking down stairs. Didn't fall.     CVA (cerebral infarction) Nov 1995 & Nov 1996    on coumadin     Esophageal reflux      Long-term (current) use of anticoagulants      Nonspecific abnormal unspecified cardiovascular function study      Osteoporosis, unspecified      Other specified disorder resulting from impaired renal function      Polycystic kidney, unspecified type      Unspecified essential hypertension        PAST SURGICAL HISTORY:  Past Surgical History:   Procedure Laterality Date     BIOPSY BREAST  1976 or 1977    left breast? No lump, bleeding from nipple     HYSTERECTOMY, PAP NO LONGER INDICATED  1972 or 1973    Hysterectomy due to cervical cancer     JOINT REPLACEMENT, HIP RT/LT      Left Hip Replacement     TRANSPLANT KIDNEY RECIPIENT LIVING RELATED  9/27/1995    Right side, both original kidneys left in place        FAMILY HISTORY:  Family History   Problem Relation Age of Onset     Arthritis Sister      rheumatoid     Asthma Sister      CANCER Sister 53     Non-Hodgkins lymphoma     KIDNEY DISEASE Sister      Polycystic kidney disease. Transplant age 50     KIDNEY DISEASE Sister      Polycystic kidney disease. Transplant age 51     Myocardial Infarction Mother 53     2nd MI age 56     CEREBROVASCULAR DISEASE Mother 53     Respiratory Father      Emphysema     Myocardial Infarction Maternal Grandmother      Hypertension Maternal Grandfather      KIDNEY DISEASE Maternal Grandfather      polycystic kidney disease     Anxiety Disorder Brother      KIDNEY DISEASE Daughter      Polycystic kidney disease       SOCIAL HISTORY:  Social History     Social History     Marital status:      Spouse name: N/A     Number of children: 2     Years of education: N/A     Occupational History     disability      was  for [employer]      Retired     Social History Main Topics     Smoking status: Former Smoker     Quit date: 1/1/1990     Smokeless tobacco: Never Used     Alcohol use Yes      Comment: 2-3x per year     Drug use: No     Sexual activity: Yes     Partners: Male     Other Topics Concern     Parent/Sibling W/ Cabg, Mi Or Angioplasty Before 65f 55m? Yes     Social History Narrative       Review of Systems:  Skin:  Positive for     Eyes:  Positive for glasses;cataracts  ENT:  Negative    Respiratory:  Positive for shortness of breath;cough  Cardiovascular:    syncope or near-syncope;Positive for;lightheadedness;dizziness  Gastroenterology: Negative    Genitourinary:  Negative    Musculoskeletal:  Positive for back pain;joint pain;arthritis  Neurologic:  Positive for stroke;numbness or tingling of feet  Psychiatric:  Negative    Heme/Lymph/Imm:  Negative    Endocrine:  Negative      Physical Exam:  Vitals: /60 (BP Location: Right arm, Patient Position: Chair, Cuff Size: Adult Large)  Pulse 69  Wt 111.9 kg (246 lb 12.8  oz)  SpO2 96%  BMI 42.36 kg/m2    Constitutional:           Skin:           Head:           Eyes:           ENT:           Neck:           Chest:           Cardiac:                    Abdomen:           Vascular:                                        Extremities and Back:           Neurological:             Recent Lab Results:  LIPID RESULTS:  Lab Results   Component Value Date    CHOL 152 09/15/2017    HDL 86 09/15/2017    LDL 45 09/15/2017    TRIG 105 09/15/2017    CHOLHDLRATIO 2.0 02/06/2015       LIVER ENZYME RESULTS:  Lab Results   Component Value Date    AST 29 01/25/2018    ALT 21 01/25/2018       CBC RESULTS:  Lab Results   Component Value Date    WBC 6.2 02/24/2018    RBC 4.27 02/24/2018    HGB 13.2 02/24/2018    HCT 39.7 02/24/2018    MCV 93 02/24/2018    MCH 30.9 02/24/2018    MCHC 33.2 02/24/2018    RDW 14.1 02/24/2018     (L) 02/24/2018       BMP RESULTS:  Lab Results   Component Value Date     02/24/2018    POTASSIUM 3.4 02/24/2018    CHLORIDE 105 02/24/2018    CO2 30 02/24/2018    ANIONGAP 6 02/24/2018     (H) 02/24/2018    BUN 33 (H) 02/24/2018    CR 1.67 (H) 02/24/2018    GFRESTIMATED 30 (L) 02/24/2018    GFRESTBLACK 36 (L) 02/24/2018    KRISTINA 9.4 02/24/2018        A1C RESULTS:  Lab Results   Component Value Date    A1C 5.9 07/30/2015       INR RESULTS:  Lab Results   Component Value Date    INR 2.4 (A) 03/23/2018    INR 3.2 (A) 03/09/2018    INR 3.11 (H) 03/03/2018    INR 8.29 (HH) 03/02/2018       Thank you for allowing me to participate in the care of your patient.    Sincerely,     Salomón Minaya MD     [facility]

## 2018-05-03 ENCOUNTER — TRANSFERRED RECORDS (OUTPATIENT)
Dept: HEALTH INFORMATION MANAGEMENT | Facility: CLINIC | Age: 73
End: 2018-05-03

## 2018-05-04 ENCOUNTER — ANTICOAGULATION THERAPY VISIT (OUTPATIENT)
Dept: NURSING | Facility: CLINIC | Age: 73
End: 2018-05-04
Payer: COMMERCIAL

## 2018-05-04 DIAGNOSIS — Z79.01 LONG-TERM (CURRENT) USE OF ANTICOAGULANTS: ICD-10-CM

## 2018-05-04 DIAGNOSIS — I63.50 CEREBRAL ARTERY OCCLUSION WITH CEREBRAL INFARCTION (H): ICD-10-CM

## 2018-05-04 DIAGNOSIS — Z86.73 HISTORY OF CVA (CEREBROVASCULAR ACCIDENT): ICD-10-CM

## 2018-05-04 LAB — INR POINT OF CARE: 2 (ref 0.86–1.14)

## 2018-05-04 PROCEDURE — 36416 COLLJ CAPILLARY BLOOD SPEC: CPT

## 2018-05-04 PROCEDURE — 99207 ZZC NO CHARGE NURSE ONLY: CPT

## 2018-05-04 PROCEDURE — 85610 PROTHROMBIN TIME: CPT | Mod: QW

## 2018-05-04 NOTE — MR AVS SNAPSHOT
Elise Mason   5/4/2018 10:00 AM   Anticoagulation Therapy Visit    Description:  72 year old female   Provider:  [facility]   Department:  [facility]           INR as of 5/4/2018     Today's INR 2.0      Anticoagulation Summary as of 5/4/2018     INR goal 2.0-3.0   Today's INR 2.0   Full instructions 5 mg on Mon, Wed, Fri; 2.5 mg all other days   Next INR check 6/15/2018    Indications   Long-term (current) use of anticoagulants [Z79.01]  Cerebral artery occlusion with cerebral infarction (H) [I63.50]  History of CVA (cerebrovascular accident) [Z86.73]         Your next Anticoagulation Clinic appointment(s)     Harman 15, 2018 10:00 AM CDT   Anticoagulation Visit with  ANTICOAGULATION CLINIC   [facility] ([facility])    [address] [place] [address]  [address]  [facility] [address] [phone]              Contact Numbers     [facility]  Please call  [phone] to cancel and/or reschedule your appointment   Please call  [phone] with any problems or questions regarding your therapy.        May 2018 Details    Sun Mon Tue Wed Thu Fri Sat       1               2               3               4      5 mg   See details      5      2.5 mg           6      2.5 mg         7      5 mg         8      2.5 mg         9      5 mg         10      2.5 mg         11      5 mg         12      2.5 mg           13      2.5 mg         14      5 mg         15      2.5 mg         16      5 mg         17      2.5 mg         18      5 mg         19      2.5 mg           20      2.5 mg         21      5 mg         22      2.5 mg         23      5 mg         24      2.5 mg         25      5 mg         26      2.5 mg           27      2.5 mg         28      5 mg         29      2.5 mg         30      5 mg         31      2.5 mg            Date Details   05/04 This INR check               How to take your warfarin dose     To take:  2.5 mg Take 1 of the 2.5 mg tablets.    To take:  5  mg Take 2 of the 2.5 mg tablets.           June 2018 Details    Sun Mon Tue Wed Thu Fri Sat          1      5 mg         2      2.5 mg           3      2.5 mg         4      5 mg         5      2.5 mg         6      5 mg         7      2.5 mg         8      5 mg         9      2.5 mg           10      2.5 mg         11      5 mg         12      2.5 mg         13      5 mg         14      2.5 mg         15            16                 17               18               19               20               21               22               23                 24               25               26               27               28               29               30                Date Details   No additional details    Date of next INR:  6/15/2018         How to take your warfarin dose     To take:  2.5 mg Take 1 of the 2.5 mg tablets.    To take:  5 mg Take 2 of the 2.5 mg tablets.

## 2018-05-04 NOTE — PROGRESS NOTES
ANTICOAGULATION FOLLOW-UP CLINIC VISIT    Patient Name:  Elise Mason  Date:  5/4/2018  Contact Type:  Face to Face    SUBJECTIVE:     Patient Findings     Positives No Problem Findings           OBJECTIVE    INR Protime   Date Value Ref Range Status   05/04/2018 2.0 (A) 0.86 - 1.14 Final       ASSESSMENT / PLAN  INR assessment THER    Recheck INR In: 6 WEEKS    INR Location Clinic      Anticoagulation Summary as of 5/4/2018     INR goal 2.0-3.0   Today's INR 2.0   Maintenance plan 5 mg (2.5 mg x 2) on Mon, Wed, Fri; 2.5 mg (2.5 mg x 1) all other days   Full instructions 5 mg on Mon, Wed, Fri; 2.5 mg all other days   Weekly total 25 mg   No change documented Fallon Velazco RN   Plan last modified Fallon Velazco RN (2/9/2018)   Next INR check 6/15/2018   Target end date Indefinite    Indications   Long-term (current) use of anticoagulants [Z79.01]  Cerebral artery occlusion with cerebral infarction (H) [I63.50]  History of CVA (cerebrovascular accident) [Z86.73]         Anticoagulation Episode Summary     [facility]    Preferred lab     Send INR reminders to [facility]    Comments 2.5mg tabs - dyllan dose // CALENDAR       Anticoagulation Care Providers     Provider Role Specialty Phone number    Cyn Crow MD  Internal Medicine [phone]            See the Encounter Report to view Anticoagulation Flowsheet and Dosing Calendar (Go to Encounters tab in chart review, and find the Anticoagulation Therapy Visit)        Fallon Velazco RN

## 2018-05-07 DIAGNOSIS — Z94.0 KIDNEY REPLACED BY TRANSPLANT: ICD-10-CM

## 2018-05-07 DIAGNOSIS — I10 BENIGN ESSENTIAL HYPERTENSION: ICD-10-CM

## 2018-05-09 RX ORDER — FELODIPINE 2.5 MG/1
TABLET, EXTENDED RELEASE ORAL
Qty: 90 TABLET | Refills: 3 | Status: SHIPPED | OUTPATIENT
Start: 2018-05-09 | End: 2019-01-01

## 2018-05-21 DIAGNOSIS — Z86.73 HISTORY OF CVA (CEREBROVASCULAR ACCIDENT): ICD-10-CM

## 2018-05-21 DIAGNOSIS — Z79.01 LONG-TERM (CURRENT) USE OF ANTICOAGULANTS: ICD-10-CM

## 2018-05-21 RX ORDER — WARFARIN SODIUM 2.5 MG/1
TABLET ORAL
Qty: 115 TABLET | Refills: 1 | Status: SHIPPED | OUTPATIENT
Start: 2018-05-21 | End: 2018-01-01

## 2018-05-21 NOTE — TELEPHONE ENCOUNTER
"Requested Prescriptions   Pending Prescriptions Disp Refills     warfarin (COUMADIN) 2.5 MG tablet [Pharmacy Med Name: WARFARIN TABS 2.5MG]    Last Written Prescription Date:  2/9/2018  Last Fill Quantity: 115,  # refills: 1   Last office visit: 3/2/2018 with prescribing provider:  Cyn Crow     Future Office Visit:   Next 5 appointments (look out 90 days)     Jul 18, 2018  9:45 AM CDT   Return Visit with Salomón Minaya MD   [facility]    [address] [place] [address]  [address]  [facility] [address]   [phone]                  115 tablet 1     Sig: TAKE 2 TABLETS (5 MG) MONDAY AND FRIDAY AND 1 TABLET (2.5 MG) TUESDAY, WEDNESDAY, THURSDAY, SATURDAY AND SUNDAY OR AS DIRECTED BY INR CLINIC    Vitamin K Antagonists Failed    5/21/2018  9:58 AM       Failed - INR is within goal in the past 6 weeks    Confirm INR is within goal in the past 6 weeks.     Recent Labs   Lab Test 05/04/18   INR  2.0*                      Passed - Recent (12 mo) or future (30 days) visit within the authorizing provider's specialty    Patient had office visit in the last 12 months or has a visit in the next 30 days with authorizing provider or within the authorizing provider's specialty.  See \"Patient Info\" tab in inbasket, or \"Choose Columns\" in Meds & Orders section of the refill encounter.           Passed - Patient is 18 years of age or older       Passed - Patient is not pregnant       Passed - No positive pregnancy on file in past 12 months          "

## 2018-06-15 ENCOUNTER — ANTICOAGULATION THERAPY VISIT (OUTPATIENT)
Dept: NURSING | Facility: CLINIC | Age: 73
End: 2018-06-15
Payer: COMMERCIAL

## 2018-06-15 DIAGNOSIS — Z79.01 LONG-TERM (CURRENT) USE OF ANTICOAGULANTS: ICD-10-CM

## 2018-06-15 DIAGNOSIS — Z86.73 HISTORY OF CVA (CEREBROVASCULAR ACCIDENT): ICD-10-CM

## 2018-06-15 DIAGNOSIS — I63.50 CEREBRAL ARTERY OCCLUSION WITH CEREBRAL INFARCTION (H): ICD-10-CM

## 2018-06-15 LAB — INR POINT OF CARE: 2.9 (ref 0.86–1.14)

## 2018-06-15 PROCEDURE — 36416 COLLJ CAPILLARY BLOOD SPEC: CPT

## 2018-06-15 PROCEDURE — 85610 PROTHROMBIN TIME: CPT | Mod: QW

## 2018-06-15 PROCEDURE — 99207 ZZC NO CHARGE NURSE ONLY: CPT

## 2018-06-15 NOTE — MR AVS SNAPSHOT
Elise Mason   6/15/2018 10:00 AM   Anticoagulation Therapy Visit    Description:  72 year old female   Provider:  [facility]   Department:   Nurse           INR as of 6/15/2018     Today's INR 2.9      Anticoagulation Summary as of 6/15/2018     INR goal 2.0-3.0   Today's INR 2.9   Full warfarin instructions 5 mg on Mon, Wed, Fri; 2.5 mg all other days   Next INR check 7/27/2018    Indications   Long-term (current) use of anticoagulants [Z79.01]  Cerebral artery occlusion with cerebral infarction (H) [I63.50]  History of CVA (cerebrovascular accident) [Z86.73]         Your next Anticoagulation Clinic appointment(s)     Jul 27, 2018 10:00 AM CDT   Anticoagulation Visit with  ANTICOAGULATION CLINIC   [facility] ([facility])    [address] [place] [address]  [address]  [facility] [address] [phone]              Contact Numbers     [facility]  Please call  [phone] to cancel and/or reschedule your appointment   Please call  [phone] with any problems or questions regarding your therapy.        June 2018 Details    Sun Mon Tue Wed Thu Fri Sat          1               2                 3               4               5               6               7               8               9                 10               11               12               13               14               15      5 mg   See details      16      2.5 mg           17      2.5 mg         18      5 mg         19      2.5 mg         20      5 mg         21      2.5 mg         22      5 mg         23      2.5 mg           24      2.5 mg         25      5 mg         26      2.5 mg         27      5 mg         28      2.5 mg         29      5 mg         30      2.5 mg          Date Details   06/15 This INR check               How to take your warfarin dose     To take:  2.5 mg Take 1 of the 2.5 mg tablets.    To take:  5 mg Take 2 of the 2.5 mg tablets.           July 2018 Details     Sun Mon Tue Wed Thu Fri Sat     1      2.5 mg         2      5 mg         3      2.5 mg         4      5 mg         5      2.5 mg         6      5 mg         7      2.5 mg           8      2.5 mg         9      5 mg         10      2.5 mg         11      5 mg         12      2.5 mg         13      5 mg         14      2.5 mg           15      2.5 mg         16      5 mg         17      2.5 mg         18      5 mg         19      2.5 mg         20      5 mg         21      2.5 mg           22      2.5 mg         23      5 mg         24      2.5 mg         25      5 mg         26      2.5 mg         27            28                 29               30               31                    Date Details   No additional details    Date of next INR:  7/27/2018         How to take your warfarin dose     To take:  2.5 mg Take 1 of the 2.5 mg tablets.    To take:  5 mg Take 2 of the 2.5 mg tablets.

## 2018-06-15 NOTE — PROGRESS NOTES
ANTICOAGULATION FOLLOW-UP CLINIC VISIT    Patient Name:  Elise Mason  Date:  6/15/2018  Contact Type:  Face to Face    SUBJECTIVE:     Patient Findings     Positives No Problem Findings           OBJECTIVE    INR Protime   Date Value Ref Range Status   06/15/2018 2.9 (A) 0.86 - 1.14 Final       ASSESSMENT / PLAN  INR assessment THER    Recheck INR In: 6 WEEKS    INR Location Clinic      Anticoagulation Summary as of 6/15/2018     INR goal 2.0-3.0   Today's INR 2.9   Warfarin maintenance plan 5 mg (2.5 mg x 2) on Mon, Wed, Fri; 2.5 mg (2.5 mg x 1) all other days   Full warfarin instructions 5 mg on Mon, Wed, Fri; 2.5 mg all other days   Weekly warfarin total 25 mg   No change documented Fallon Velazco RN   Plan last modified Fallon Velazco RN (2/9/2018)   Next INR check 7/27/2018   Target end date Indefinite    Indications   Long-term (current) use of anticoagulants [Z79.01]  Cerebral artery occlusion with cerebral infarction (H) [I63.50]  History of CVA (cerebrovascular accident) [Z86.73]         Anticoagulation Episode Summary     INR check location [facility]    Preferred lab     Send INR reminders to [facility]    Comments 2.5mg tabs - dyllan dose // CALENDAR       Anticoagulation Care Providers     Provider Role Specialty Phone number    Cyn Crow MD  Internal Medicine [phone]            See the Encounter Report to view Anticoagulation Flowsheet and Dosing Calendar (Go to Encounters tab in chart review, and find the Anticoagulation Therapy Visit)        Fallon Velazco RN

## 2018-07-18 ENCOUNTER — OFFICE VISIT (OUTPATIENT)
Dept: CARDIOLOGY | Facility: CLINIC | Age: 73
End: 2018-07-18
Payer: COMMERCIAL

## 2018-07-18 VITALS
HEART RATE: 56 BPM | WEIGHT: 244.1 LBS | BODY MASS INDEX: 41.9 KG/M2 | OXYGEN SATURATION: 95 % | SYSTOLIC BLOOD PRESSURE: 110 MMHG | DIASTOLIC BLOOD PRESSURE: 60 MMHG

## 2018-07-18 DIAGNOSIS — I25.118 CORONARY ARTERY DISEASE OF NATIVE ARTERY OF NATIVE HEART WITH STABLE ANGINA PECTORIS (H): ICD-10-CM

## 2018-07-18 DIAGNOSIS — R55 SYNCOPE, UNSPECIFIED SYNCOPE TYPE: Primary | ICD-10-CM

## 2018-07-18 PROCEDURE — 99213 OFFICE O/P EST LOW 20 MIN: CPT | Performed by: INTERNAL MEDICINE

## 2018-07-18 NOTE — LETTER
7/18/2018    Cyn Crow MD  [address] [place] [address]    RE: Elise Mason       Dear Colleague,    I had the pleasure of seeing Elise Mason in the [facility].    HISTORY:    Elise Mason is a pleasant 72-year-old female with a history of polycystic kidney disease, previous kidney transplant in 1995, CKD, hypertension, history of CVA on chronic warfarin, anemia, GERD, cervical cancer post hysterectomy, lumbar compression fractures, and obesity.  She also has suspected coronary artery disease with a nuclear stress test suggesting a previous small nontransmural infarct with mild simon-infarct ischemia.  Patient has never had an angiogram completed.    Initially Elise  was seen when she experienced an episode of syncope.  A 14 day monitor showed some arrhythmias, the longest being a 4 beat run at 189 bpm.  This led to her nuclear stress test discussed above.  Today she reports that she has not had any further episodes of syncope or near syncope.  She continues to walk on a regular basis and her nursing home, with the assistance of a walker.  She has some mild dyspnea on exertion, but it does not limit her from the activities she wishes to do.  She denies any exertional chest, arm, neck, or jaw discomfort.  She also denies symptoms of recurrent syncope/near syncope, strokelike symptoms, orthostasis, PND/orthopnea, palpitations, or claudication.  She does have chronic lower extremity edema.      ASSESSMENT/PLAN:    1.  Likely coronary artery disease based on positive nuclear scan.  Given her history of kidney problems in the past and the small, nonthreatening size and location of the detected ischemia, as well as her lack of symptoms, a conservative approach is being pursued.  She has not had any cardiac symptoms since our last meeting.  We will continue current medications and she will let me know if she develops exertional chest pain or has  further syncopal spells.  2.  Pleural edema.  Spoke with [place] extensively about management of peripheral edema.  We talked about raising of the feet when possible and limitation of sodium intake, with extra attention to food labels.  She is already using compression stockings.  I also encouraged her to exercise as much as possible.    Thank you for allowing me to participate in your patient's care.  I will plan a one-year follow-up visit but would be more than happy to see her should problems arise.  Please do not hesitate to call if I can be of further assistance.    Orders Placed This Encounter   Procedures     Follow-Up with Cardiologist     No orders of the defined types were placed in this encounter.    There are no discontinued medications.    10 year ASCVD risk: The ASCVD Risk score ([place] ALESSANDRO Jr, et al., 2013) failed to calculate for the following reasons:    The patient has a prior MCI or stroke diagnosis    Encounter Diagnoses   Name Primary?     Coronary artery disease of native artery of native heart with stable angina pectoris (H)      Syncope, unspecified syncope type Yes       CURRENT MEDICATIONS:  Current Outpatient Prescriptions   Medication Sig Dispense Refill     ACE NOT PRESCRIBED, INTENTIONAL, by Other route continuous prn.  0     albuterol (PROAIR HFA/PROVENTIL HFA/VENTOLIN HFA) 108 (90 BASE) MCG/ACT Inhaler Inhale 2 puffs into the lungs every 4 hours as needed for shortness of breath / dyspnea or wheezing 1 Inhaler 0     alendronate (FOSAMAX) 70 MG tablet Take 1 tablet (70 mg) by mouth once a week 12 tablet 2     amoxicillin (AMOXIL) 500 MG capsule Take 1 capsule (500 mg) by mouth daily as needed 4 capsule 1     ARB NOT PRESCRIBED, INTENTIONAL, by Other route continuous prn.  0     ASPIRIN NOT PRESCRIBED, INTENTIONAL, 1 each continuous prn for other Antiplatelet medication not prescribed intentionally due to Current anticoagulant therapy (warfarin/enoxaparin) 0 each 0     AZATHIOPRINE 50 MG  OR TABS Take three tablets by mouth daily 90 11     betamethasone dipropionate (DIPROSONE) 0.05 % cream Apply sparingly to affected area twice daily as needed.  Do not apply to face. 45 g 4     calcitRIOL (ROCALTROL) 0.5 MCG capsule Take 1 capsule (0.5 mcg) by mouth daily 90 capsule 2     cloNIDine (CATAPRES) 0.2 MG tablet Take 1 tablet (0.2 mg) by mouth 2 times daily 180 tablet 2     cycloSPORINE modified (GENGRAF) 25 MG capsule Take 3 capsules by mouth 2 times daily. 540 capsule 11     diltiazem (TIAZAC) 240 MG 24 hr ER beaded capsule Take 1 capsule (240 mg) by mouth daily 90 capsule 2     felodipine ER (PLENDIL) 2.5 MG 24 hr tablet TAKE 1 TABLET DAILY 90 tablet 3     ferrous sulfate 325 (65 FE) MG tablet Take 1 tablet by mouth daily. 90 tablet 1     folic acid (FOLVITE) 1 MG tablet TAKE 2 TABLETS (2,000 MCG) BY MOUTH DAILY 180 tablet 0     furosemide (LASIX) 80 MG tablet Take 1 tablet (80 mg) by mouth 2 times daily 180 tablet 2     GLUCOSAMINE SULFATE PO Take 750 mLs by mouth daily       nitroGLYcerin (NITROSTAT) 0.4 MG sublingual tablet For chest pain place 1 tablet under the tongue every 5 minutes for 3 doses. If symptoms persist 5 minutes after 1st dose call 911. 25 tablet 1     OMEGA 3 550 MG OR CAPS Take 2,000 mg by mouth 2 times daily.       ORDER FOR DME Please dispense 2 pair of knee high compression 20-30 stockings 2 each 0     simvastatin (ZOCOR) 20 MG tablet Take 1 tablet (20 mg) by mouth At Bedtime 90 tablet 2     sulfamethoxazole-trimethoprim (BACTRIM) 400-80 MG per tablet Take 1 tablet by mouth daily 90 tablet 2     TUMS OR Take 2 tablets by mouth 3 times daily.       VITAMIN B-12 500 MCG OR TABS OTC    1 tab daily per transplant clinic       VITAMIN B-6 100 MG OR TABS OTC   2 tabs daily per transplant clinic       warfarin (COUMADIN) 2.5 MG tablet TAKE 2 TABLETS (5 MG) MONDAY AND FRIDAY AND 1 TABLET (2.5 MG) TUESDAY, WEDNESDAY, THURSDAY, SATURDAY AND SUNDAY OR AS DIRECTED BY INR CLINIC 115 tablet 1        ALLERGIES     Allergies   Allergen Reactions     No Known Allergies        PAST MEDICAL HISTORY:  Past Medical History:   Diagnosis Date     Anemia, unspecified      Cervical cancer (H) 1972    Hysterectomy, still has ovaries     Compression fracture of lumbar vertebra (H) July 1996    L1 and L4? Happened while walking down stairs. Didn't fall.     CVA (cerebral infarction) Nov 1995 & Nov 1996    on coumadin     Esophageal reflux      Long-term (current) use of anticoagulants      Nonspecific abnormal unspecified cardiovascular function study      Osteoporosis, unspecified      Other specified disorder resulting from impaired renal function      Polycystic kidney, unspecified type      Unspecified essential hypertension        PAST SURGICAL HISTORY:  Past Surgical History:   Procedure Laterality Date     BIOPSY BREAST  1976 or 1977    left breast? No lump, bleeding from nipple     HYSTERECTOMY, PAP NO LONGER INDICATED  1972 or 1973    Hysterectomy due to cervical cancer     JOINT REPLACEMENT, HIP RT/LT      Left Hip Replacement     TRANSPLANT KIDNEY RECIPIENT LIVING RELATED  9/27/1995    Right side, both original kidneys left in place       FAMILY HISTORY:  Family History   Problem Relation Age of Onset     Arthritis Sister      rheumatoid     Asthma Sister      Cancer Sister 53     Non-Hodgkins lymphoma     KIDNEY DISEASE Sister      Polycystic kidney disease. Transplant age 50     KIDNEY DISEASE Sister      Polycystic kidney disease. Transplant age 51     Myocardial Infarction Mother 53     2nd MI age 56     Cerebrovascular Disease Mother 53     Respiratory Father      Emphysema     Myocardial Infarction Maternal Grandmother      Hypertension Maternal Grandfather      KIDNEY DISEASE Maternal Grandfather      polycystic kidney disease     Anxiety Disorder Brother      KIDNEY DISEASE Daughter      Polycystic kidney disease       SOCIAL HISTORY:  Social History     Social History     Marital status:       Spouse name: N/A     Number of children: 2     Years of education: N/A     Occupational History     disability      was  for [employer]      Retired     Social History Main Topics     Smoking status: Former Smoker     Quit date: 1/1/1990     Smokeless tobacco: Never Used     Alcohol use Yes      Comment: 2-3x per year     Drug use: No     Sexual activity: Yes     Partners: Male     Other Topics Concern     Parent/Sibling W/ Cabg, Mi Or Angioplasty Before 65f 55m? Yes     Social History Narrative       Review of Systems:  Skin:  Positive for     Eyes:  Positive for glasses;cataracts  ENT:  Negative    Respiratory:  Positive for shortness of breath;cough  Cardiovascular:    syncope or near-syncope;Positive for;lightheadedness;dizziness  Gastroenterology: Negative    Genitourinary:  Negative    Musculoskeletal:  Positive for back pain;joint pain;arthritis  Neurologic:  Positive for stroke;numbness or tingling of feet  Psychiatric:  Negative    Heme/Lymph/Imm:  Negative    Endocrine:  Negative      Physical Exam:  Vitals: /60 (BP Location: Right arm, Patient Position: Chair, Cuff Size: Adult Regular)  Pulse 56  Wt 110.7 kg (244 lb 1.6 oz)  SpO2 95%  BMI 41.9 kg/m2    Constitutional:  cooperative, alert and oriented, well developed, well nourished, in no acute distress morbidly obese      Skin:  warm and dry to the touch        Head:  normocephalic        Eyes:  no xanthalasma        ENT:  no pallor or cyanosis        Neck:  carotid pulses are full and equal bilaterally, JVP normal, no carotid bruit        Chest:      Use of breath sounds at both bases, otherwise clear lungs.    Cardiac: regular rhythm;normal S1 and S2;no S3 or S4;apical impulse not displaced       systolic murmur;grade 1          Abdomen:  abdomen soft, non-tender, BS normoactive, no mass, no HSM, no bruits        Vascular:                                   Patient is wearing thick compression stockings, not removed, pulses not palpable  through these.  Radial pulses normal bilaterally.    Extremities and Back:           Neurological:  no gross motor deficits          Recent Lab Results:  LIPID RESULTS:  Lab Results   Component Value Date    CHOL 152 09/15/2017    HDL 86 09/15/2017    LDL 45 09/15/2017    TRIG 105 09/15/2017    CHOLHDLRATIO 2.0 02/06/2015       LIVER ENZYME RESULTS:  Lab Results   Component Value Date    AST 29 01/25/2018    ALT 21 01/25/2018       CBC RESULTS:  Lab Results   Component Value Date    WBC 6.2 02/24/2018    RBC 4.27 02/24/2018    HGB 13.2 02/24/2018    HCT 39.7 02/24/2018    MCV 93 02/24/2018    MCH 30.9 02/24/2018    MCHC 33.2 02/24/2018    RDW 14.1 02/24/2018     (L) 02/24/2018       BMP RESULTS:  Lab Results   Component Value Date     02/24/2018    POTASSIUM 3.4 02/24/2018    CHLORIDE 105 02/24/2018    CO2 30 02/24/2018    ANIONGAP 6 02/24/2018     (H) 02/24/2018    BUN 33 (H) 02/24/2018    CR 1.67 (H) 02/24/2018    GFRESTIMATED 30 (L) 02/24/2018    GFRESTBLACK 36 (L) 02/24/2018    KRISTINA 9.4 02/24/2018        A1C RESULTS:  Lab Results   Component Value Date    A1C 5.9 07/30/2015       INR RESULTS:  Lab Results   Component Value Date    INR 2.9 (A) 06/15/2018    INR 2.0 (A) 05/04/2018    INR 3.11 (H) 03/03/2018    INR 8.29 (HH) 03/02/2018       Thank you for allowing me to participate in the care of your patient.    Sincerely,     Salomón Minaya MD     [facility]

## 2018-07-18 NOTE — PROGRESS NOTES
HISTORY:    Elise Mason is a pleasant 72-year-old female with a history of polycystic kidney disease, previous kidney transplant in 1995, CKD, hypertension, history of CVA on chronic warfarin, anemia, GERD, cervical cancer post hysterectomy, lumbar compression fractures, and obesity.  She also has suspected coronary artery disease with a nuclear stress test suggesting a previous small nontransmural infarct with mild simon-infarct ischemia.  Patient has never had an angiogram completed.    Initially Elise  was seen when she experienced an episode of syncope.  A 14 day monitor showed some arrhythmias, the longest being a 4 beat run at 189 bpm.  This led to her nuclear stress test discussed above.  Today she reports that she has not had any further episodes of syncope or near syncope.  She continues to walk on a regular basis and her nursing home, with the assistance of a walker.  She has some mild dyspnea on exertion, but it does not limit her from the activities she wishes to do.  She denies any exertional chest, arm, neck, or jaw discomfort.  She also denies symptoms of recurrent syncope/near syncope, strokelike symptoms, orthostasis, PND/orthopnea, palpitations, or claudication.  She does have chronic lower extremity edema.      ASSESSMENT/PLAN:    1.  Likely coronary artery disease based on positive nuclear scan.  Given her history of kidney problems in the past and the small, nonthreatening size and location of the detected ischemia, as well as her lack of symptoms, a conservative approach is being pursued.  She has not had any cardiac symptoms since our last meeting.  We will continue current medications and she will let me know if she develops exertional chest pain or has further syncopal spells.  2.  Pleural edema.  Spoke with El[place] extensively about management of peripheral edema.  We talked about raising of the feet when possible and limitation of sodium intake, with extra attention to food labels.   She is already using compression stockings.  I also encouraged her to exercise as much as possible.    Thank you for allowing me to participate in your patient's care.  I will plan a one-year follow-up visit but would be more than happy to see her should problems arise.  Please do not hesitate to call if I can be of further assistance.    Orders Placed This Encounter   Procedures     Follow-Up with Cardiologist     No orders of the defined types were placed in this encounter.    There are no discontinued medications.    10 year ASCVD risk: The ASCVD Risk score ([place] ALESSANDRO Jr, et al., 2013) failed to calculate for the following reasons:    The patient has a prior MCI or stroke diagnosis    Encounter Diagnoses   Name Primary?     Coronary artery disease of native artery of native heart with stable angina pectoris (H)      Syncope, unspecified syncope type Yes       CURRENT MEDICATIONS:  Current Outpatient Prescriptions   Medication Sig Dispense Refill     ACE NOT PRESCRIBED, INTENTIONAL, by Other route continuous prn.  0     albuterol (PROAIR HFA/PROVENTIL HFA/VENTOLIN HFA) 108 (90 BASE) MCG/ACT Inhaler Inhale 2 puffs into the lungs every 4 hours as needed for shortness of breath / dyspnea or wheezing 1 Inhaler 0     alendronate (FOSAMAX) 70 MG tablet Take 1 tablet (70 mg) by mouth once a week 12 tablet 2     amoxicillin (AMOXIL) 500 MG capsule Take 1 capsule (500 mg) by mouth daily as needed 4 capsule 1     ARB NOT PRESCRIBED, INTENTIONAL, by Other route continuous prn.  0     ASPIRIN NOT PRESCRIBED, INTENTIONAL, 1 each continuous prn for other Antiplatelet medication not prescribed intentionally due to Current anticoagulant therapy (warfarin/enoxaparin) 0 each 0     AZATHIOPRINE 50 MG OR TABS Take three tablets by mouth daily 90 11     betamethasone dipropionate (DIPROSONE) 0.05 % cream Apply sparingly to affected area twice daily as needed.  Do not apply to face. 45 g 4     calcitRIOL (ROCALTROL) 0.5 MCG capsule Take  1 capsule (0.5 mcg) by mouth daily 90 capsule 2     cloNIDine (CATAPRES) 0.2 MG tablet Take 1 tablet (0.2 mg) by mouth 2 times daily 180 tablet 2     cycloSPORINE modified (GENGRAF) 25 MG capsule Take 3 capsules by mouth 2 times daily. 540 capsule 11     diltiazem (TIAZAC) 240 MG 24 hr ER beaded capsule Take 1 capsule (240 mg) by mouth daily 90 capsule 2     felodipine ER (PLENDIL) 2.5 MG 24 hr tablet TAKE 1 TABLET DAILY 90 tablet 3     ferrous sulfate 325 (65 FE) MG tablet Take 1 tablet by mouth daily. 90 tablet 1     folic acid (FOLVITE) 1 MG tablet TAKE 2 TABLETS (2,000 MCG) BY MOUTH DAILY 180 tablet 0     furosemide (LASIX) 80 MG tablet Take 1 tablet (80 mg) by mouth 2 times daily 180 tablet 2     GLUCOSAMINE SULFATE PO Take 750 mLs by mouth daily       nitroGLYcerin (NITROSTAT) 0.4 MG sublingual tablet For chest pain place 1 tablet under the tongue every 5 minutes for 3 doses. If symptoms persist 5 minutes after 1st dose call 911. 25 tablet 1     OMEGA 3 550 MG OR CAPS Take 2,000 mg by mouth 2 times daily.       ORDER FOR DME Please dispense 2 pair of knee high compression 20-30 stockings 2 each 0     simvastatin (ZOCOR) 20 MG tablet Take 1 tablet (20 mg) by mouth At Bedtime 90 tablet 2     sulfamethoxazole-trimethoprim (BACTRIM) 400-80 MG per tablet Take 1 tablet by mouth daily 90 tablet 2     TUMS OR Take 2 tablets by mouth 3 times daily.       VITAMIN B-12 500 MCG OR TABS OTC    1 tab daily per transplant clinic       VITAMIN B-6 100 MG OR TABS OTC   2 tabs daily per transplant clinic       warfarin (COUMADIN) 2.5 MG tablet TAKE 2 TABLETS (5 MG) MONDAY AND FRIDAY AND 1 TABLET (2.5 MG) TUESDAY, WEDNESDAY, THURSDAY, SATURDAY AND SUNDAY OR AS DIRECTED BY INR CLINIC 115 tablet 1       ALLERGIES     Allergies   Allergen Reactions     No Known Allergies        PAST MEDICAL HISTORY:  Past Medical History:   Diagnosis Date     Anemia, unspecified      Cervical cancer (H) 1972    Hysterectomy, still has ovaries      Compression fracture of lumbar vertebra (H) July 1996    L1 and L4? Happened while walking down stairs. Didn't fall.     CVA (cerebral infarction) Nov 1995 & Nov 1996    on coumadin     Esophageal reflux      Long-term (current) use of anticoagulants      Nonspecific abnormal unspecified cardiovascular function study      Osteoporosis, unspecified      Other specified disorder resulting from impaired renal function      Polycystic kidney, unspecified type      Unspecified essential hypertension        PAST SURGICAL HISTORY:  Past Surgical History:   Procedure Laterality Date     BIOPSY BREAST  1976 or 1977    left breast? No lump, bleeding from nipple     HYSTERECTOMY, PAP NO LONGER INDICATED  1972 or 1973    Hysterectomy due to cervical cancer     JOINT REPLACEMENT, HIP RT/LT      Left Hip Replacement     TRANSPLANT KIDNEY RECIPIENT LIVING RELATED  9/27/1995    Right side, both original kidneys left in place       FAMILY HISTORY:  Family History   Problem Relation Age of Onset     Arthritis Sister      rheumatoid     Asthma Sister      Cancer Sister 53     Non-Hodgkins lymphoma     KIDNEY DISEASE Sister      Polycystic kidney disease. Transplant age 50     KIDNEY DISEASE Sister      Polycystic kidney disease. Transplant age 51     Myocardial Infarction Mother 53     2nd MI age 56     Cerebrovascular Disease Mother 53     Respiratory Father      Emphysema     Myocardial Infarction Maternal Grandmother      Hypertension Maternal Grandfather      KIDNEY DISEASE Maternal Grandfather      polycystic kidney disease     Anxiety Disorder Brother      KIDNEY DISEASE Daughter      Polycystic kidney disease       SOCIAL HISTORY:  Social History     Social History     Marital status:      Spouse name: N/A     Number of children: 2     Years of education: N/A     Occupational History     disability      was  for [employer]      Retired     Social History Main Topics     Smoking status: Former Smoker     Quit date:  1/1/1990     Smokeless tobacco: Never Used     Alcohol use Yes      Comment: 2-3x per year     Drug use: No     Sexual activity: Yes     Partners: Male     Other Topics Concern     Parent/Sibling W/ Cabg, Mi Or Angioplasty Before 65f 55m? Yes     Social History Narrative       Review of Systems:  Skin:  Positive for     Eyes:  Positive for glasses;cataracts  ENT:  Negative    Respiratory:  Positive for shortness of breath;cough  Cardiovascular:    syncope or near-syncope;Positive for;lightheadedness;dizziness  Gastroenterology: Negative    Genitourinary:  Negative    Musculoskeletal:  Positive for back pain;joint pain;arthritis  Neurologic:  Positive for stroke;numbness or tingling of feet  Psychiatric:  Negative    Heme/Lymph/Imm:  Negative    Endocrine:  Negative      Physical Exam:  Vitals: /60 (BP Location: Right arm, Patient Position: Chair, Cuff Size: Adult Regular)  Pulse 56  Wt 110.7 kg (244 lb 1.6 oz)  SpO2 95%  BMI 41.9 kg/m2    Constitutional:  cooperative, alert and oriented, well developed, well nourished, in no acute distress morbidly obese      Skin:  warm and dry to the touch        Head:  normocephalic        Eyes:  no xanthalasma        ENT:  no pallor or cyanosis        Neck:  carotid pulses are full and equal bilaterally, JVP normal, no carotid bruit        Chest:      Use of breath sounds at both bases, otherwise clear lungs.    Cardiac: regular rhythm;normal S1 and S2;no S3 or S4;apical impulse not displaced       systolic murmur;grade 1          Abdomen:  abdomen soft, non-tender, BS normoactive, no mass, no HSM, no bruits        Vascular:                                   Patient is wearing thick compression stockings, not removed, pulses not palpable through these.  Radial pulses normal bilaterally.    Extremities and Back:           Neurological:  no gross motor deficits          Recent Lab Results:  LIPID RESULTS:  Lab Results   Component Value Date    CHOL 152 09/15/2017    HDL  86 09/15/2017    LDL 45 09/15/2017    TRIG 105 09/15/2017    CHOLHDLRATIO 2.0 02/06/2015       LIVER ENZYME RESULTS:  Lab Results   Component Value Date    AST 29 01/25/2018    ALT 21 01/25/2018       CBC RESULTS:  Lab Results   Component Value Date    WBC 6.2 02/24/2018    RBC 4.27 02/24/2018    HGB 13.2 02/24/2018    HCT 39.7 02/24/2018    MCV 93 02/24/2018    MCH 30.9 02/24/2018    MCHC 33.2 02/24/2018    RDW 14.1 02/24/2018     (L) 02/24/2018       BMP RESULTS:  Lab Results   Component Value Date     02/24/2018    POTASSIUM 3.4 02/24/2018    CHLORIDE 105 02/24/2018    CO2 30 02/24/2018    ANIONGAP 6 02/24/2018     (H) 02/24/2018    BUN 33 (H) 02/24/2018    CR 1.67 (H) 02/24/2018    GFRESTIMATED 30 (L) 02/24/2018    GFRESTBLACK 36 (L) 02/24/2018    KRISTINA 9.4 02/24/2018        A1C RESULTS:  Lab Results   Component Value Date    A1C 5.9 07/30/2015       INR RESULTS:  Lab Results   Component Value Date    INR 2.9 (A) 06/15/2018    INR 2.0 (A) 05/04/2018    INR 3.11 (H) 03/03/2018    INR 8.29 (HH) 03/02/2018         Salomón Minaya MD, FACC    CC  Salomón Minaya MD  [address] [place] [id number]

## 2018-07-18 NOTE — MR AVS SNAPSHOT
After Visit Summary   7/18/2018    Elise Mason    MRN: [id number]           Patient Information     Date Of Birth          1945        Visit Information        Provider Department      7/18/2018 9:45 AM Salomón Minaya MD [facility]        Today's Diagnoses     Syncope, unspecified syncope type    -  1    Coronary artery disease of native artery of native heart with stable angina pectoris (H)           Follow-ups after your visit        Additional Services     Follow-Up with Cardiologist                 Your next 10 appointments already scheduled     Jul 27, 2018 10:00 AM CDT   Antico[facility]  [facility] CLINIC   [facility]    [address]  [address]  [facility] [facility] [address]   [phone]            Aug 23, 2018  8:40 AM CDT   PHYSICAL with Cyn Crow MD   [facility] ([facility]    [address]  [address]  [place] [facility] [address]   [phone]              Future tests that were ordered for you today     Open Future Orders        Priority Expected Expires Ordered    Follow-Up with Cardiologist Routine 7/18/2019 7/19/2019 7/18/2018            Who to contact     If you have questions or need follow up information about today's clinic visit or your schedule please contact [facility] directly at [phone].  Normal or non-critical lab and imaging results will be communicated to you by MyChart, letter or phone within 4 business days after the clinic has received the results. If you do not hear from us within 7 days, please contact the clinic through MyChart or phone. If you have a critical or abnormal lab result, we will notify you by phone as soon as possible.  Submit refill requests through [employer] or call your pharmacy and they will forward the refill request to us. Please allow 3  business days for your refill to be completed.          Additional Information About Your Visit        MyChart Information     [employer] gives you secure access to your electronic health record. If you see a primary care provider, you can also send messages to your care team and make appointments. If you have questions, please call your primary care clinic.  If you do not have a primary care provider, please call [phone] and they will assist you.        Care EveryWhere ID     This is your Care EveryWhere ID. This could be used by other organizations to access your [place] medical records  [id number]        Your Vitals Were     Pulse Pulse Oximetry BMI (Body Mass Index)             56 95% 41.9 kg/m2          Blood Pressure from Last 3 Encounters:   07/18/18 110/60   04/11/18 118/60   03/28/18 136/70    Weight from Last 3 Encounters:   07/18/18 110.7 kg (244 lb 1.6 oz)   04/11/18 111.9 kg (246 lb 12.8 oz)   03/28/18 112.1 kg (247 lb 1.6 oz)              We Performed the Following     Follow-Up with Cardiologist        Primary Care Provider Office Phone # Fax #    Cyn Crow [phone] [address] [place] [address]        Equal Access to Services     [facility] AH: Hadii aad ku hadasho Soomaali, waaxda luqadaha, qaybta kaalmada adeegyada, bibi yee. So [facility] [phone].    ATENCIÓN: Si habla español, tiene a mehta disposición servicios gratuitos de asistencia lingüística. Llame al [phone].    We comply with applicable federal civil rights laws and [place] laws. We do not discriminate on the basis of race, color, national origin, age, disability, sex, sexual orientation, or gender identity.            Thank you!     Thank you for choosing [facility]   [facility]  for your care. Our goal is always to provide you with excellent care. Hearing back from our patients is one way we can continue to improve our  services. Please take a few minutes to complete the written survey that you may receive in the mail after your visit with us. Thank you!             Your Updated Medication List - Protect others around you: Learn how to safely use, store and throw away your medicines at www.disposemymeds.org.          This list is accurate as of 7/18/18 10:16 AM.  Always use your most recent med list.                   Brand Name Dispense Instructions for use Diagnosis    * ACE NOT PRESCRIBED (INTENTIONAL)      by Other route continuous prn.    Polycystic kidney, unspecified type, Kidney replaced by transplant       albuterol 108 (90 Base) MCG/ACT Inhaler    PROAIR HFA/PROVENTIL HFA/VENTOLIN HFA    1 Inhaler    Inhale 2 puffs into the lungs every 4 hours as needed for shortness of breath / dyspnea or wheezing    Dyspnea on exertion       alendronate 70 MG tablet    FOSAMAX    12 tablet    Take 1 tablet (70 mg) by mouth once a week    Age related osteoporosis, unspecified pathological fracture presence       amoxicillin 500 MG capsule    AMOXIL    4 capsule    Take 1 capsule (500 mg) by mouth daily as needed    Kidney replaced by transplant       * ARB NOT PRESCRIBED (INTENTIONAL)      by Other route continuous prn.    Polycystic kidney, unspecified type, Kidney replaced by transplant       ASPIRIN NOT PRESCRIBED    INTENTIONAL    0 each    1 each continuous prn for other Antiplatelet medication not prescribed intentionally due to Current anticoagulant therapy (warfarin/enoxaparin)    History of CVA (cerebrovascular accident)       azaTHIOprine 50 MG tablet    IMURAN    90    Take three tablets by mouth daily        betamethasone dipropionate 0.05 % cream    DIPROSONE    45 g    Apply sparingly to affected area twice daily as needed.  Do not apply to face.    Dermatitis       calcitRIOL 0.5 MCG capsule    ROCALTROL    90 capsule    Take 1 capsule (0.5 mcg) by mouth daily    Kidney replaced by transplant       cloNIDine 0.2 MG tablet     CATAPRES    180 tablet    Take 1 tablet (0.2 mg) by mouth 2 times daily    Kidney replaced by transplant, Benign essential hypertension       cyanocolbalamin 500 MCG tablet    vitamin  B-12     OTC    1 tab daily per transplant clinic        cycloSPORINE modified 25 MG capsule     540 capsule    Take 3 capsules by mouth 2 times daily.    Kidney replaced by transplant       diltiazem 240 MG 24 hr ER beaded capsule    TIAZAC    90 capsule    Take 1 capsule (240 mg) by mouth daily    Kidney replaced by transplant, Benign essential hypertension       felodipine ER 2.5 MG 24 hr tablet    PLENDIL    90 tablet    TAKE 1 TABLET DAILY    Kidney replaced by transplant, Benign essential hypertension       ferrous sulfate 325 (65 Fe) MG tablet    IRON    90 tablet    Take 1 tablet by mouth daily.    Kidney replaced by transplant       folic acid 1 MG tablet    FOLVITE    180 tablet    TAKE 2 TABLETS (2,000 MCG) BY MOUTH DAILY    Kidney replaced by transplant       furosemide 80 MG tablet    LASIX    180 tablet    Take 1 tablet (80 mg) by mouth 2 times daily    Kidney replaced by transplant       GLUCOSAMINE SULFATE PO      Take 750 mLs by mouth daily        nitroGLYcerin 0.4 MG sublingual tablet    NITROSTAT    25 tablet    For chest pain place 1 tablet under the tongue every 5 minutes for 3 doses. If symptoms persist 5 minutes after 1st dose call 911.    Coronary artery disease of native artery of native heart with stable angina pectoris (H)       OMEGA 3 550 MG Caps   Generic drug:  LINOLENIC ACID      Take 2,000 mg by mouth 2 times daily.        * order for DME     2 each    Please dispense 2 pair of knee high compression 20-30 stockings    Kidney replaced by transplant, Polycystic kidney, unspecified type, Edema       pyridoxine 100 MG tablet    VITAMIN B-6     OTC   2 tabs daily per transplant clinic        simvastatin 20 MG tablet    ZOCOR    90 tablet    Take 1 tablet (20 mg) by mouth At Bedtime    Pure  hypercholesterolemia       sulfamethoxazole-trimethoprim 400-80 MG per tablet    BACTRIM    90 tablet    Take 1 tablet by mouth daily    Kidney replaced by transplant       TUMS PO      Take 2 tablets by mouth 3 times daily.        warfarin 2.5 MG tablet    COUMADIN    115 tablet    TAKE 2 TABLETS (5 MG) MONDAY AND FRIDAY AND 1 TABLET (2.5 MG) TUESDAY, WEDNESDAY, THURSDAY, SATURDAY AND SUNDAY OR AS DIRECTED BY INR CLINIC    Long-term (current) use of anticoagulants, History of CVA (cerebrovascular accident)       * Notice:  This list has 3 medication(s) that are the same as other medications prescribed for you. Read the directions carefully, and ask your doctor or other care provider to review them with you.

## 2018-07-23 DIAGNOSIS — R06.09 DYSPNEA ON EXERTION: ICD-10-CM

## 2018-07-23 NOTE — TELEPHONE ENCOUNTER
"Requested Prescriptions   Pending Prescriptions Disp Refills     PROAIR  (90 Base) MCG/ACT inhaler [Pharmacy Med Name: PROAIR HFA INH 8.5GM W/COUNT 90MCG]    Last Written Prescription Date:  12/1/2017  Last Fill Quantity: 1,  # refills: 0   Last office visit: 3/2/2018 with prescribing provider:  Cyn Crow     Future Office Visit:   Next 5 appointments (look out 90 days)     Aug 23, 2018  8:40 AM CDT   PHYSICAL with Cyn Crow MD   [facility] ([facility]    [address] [place] [address] [address]  [facility] [address]   [phone]                  8.5 g 0     Sig: USE 2 INHALATIONS EVERY 4 HOURS AS NEEDED FOR SHORTNESS OF BREATH, DYSPNEA OR WHEEZING    Asthma Maintenance Inhalers - Anticholinergics Passed    7/23/2018  2:17 PM       Passed - Patient is age 12 years or older       Passed - Recent (12 mo) or future (30 days) visit within the authorizing provider's specialty    Patient had office visit in the last 12 months or has a visit in the next 30 days with authorizing provider or within the authorizing provider's specialty.  See \"Patient Info\" tab in inbasket, or \"Choose Columns\" in Meds & Orders section of the refill encounter.              "

## 2018-07-25 RX ORDER — ALBUTEROL SULFATE 90 UG/1
AEROSOL, METERED RESPIRATORY (INHALATION)
Qty: 8.5 G | Refills: 3 | Status: SHIPPED | OUTPATIENT
Start: 2018-07-25 | End: 2019-01-01

## 2018-07-27 ENCOUNTER — ANTICOAGULATION THERAPY VISIT (OUTPATIENT)
Dept: NURSING | Facility: CLINIC | Age: 73
End: 2018-07-27
Payer: COMMERCIAL

## 2018-07-27 DIAGNOSIS — I63.50 CEREBRAL ARTERY OCCLUSION WITH CEREBRAL INFARCTION (H): ICD-10-CM

## 2018-07-27 DIAGNOSIS — Z86.73 HISTORY OF CVA (CEREBROVASCULAR ACCIDENT): ICD-10-CM

## 2018-07-27 DIAGNOSIS — Z79.01 LONG-TERM (CURRENT) USE OF ANTICOAGULANTS: ICD-10-CM

## 2018-07-27 LAB — INR POINT OF CARE: 3.3 (ref 0.86–1.14)

## 2018-07-27 PROCEDURE — 85610 PROTHROMBIN TIME: CPT | Mod: QW

## 2018-07-27 PROCEDURE — 36416 COLLJ CAPILLARY BLOOD SPEC: CPT

## 2018-07-27 PROCEDURE — 99207 ZZC NO CHARGE NURSE ONLY: CPT

## 2018-07-27 NOTE — MR AVS SNAPSHOT
Elise Mason   7/27/2018 10:00 AM   Anticoagulation Therapy Visit    Description:  72 year old female   Provider:  [facility]   Department:   Nurse           INR as of 7/27/2018     Today's INR 3.3!      Anticoagulation Summary as of 7/27/2018     INR goal 2.0-3.0   Today's INR 3.3!   Full warfarin instructions 7/27: 2.5 mg; Otherwise 5 mg on Mon, Wed, Fri; 2.5 mg all other days   Next INR check 8/23/2018    Indications   Long-term (current) use of anticoagulants [Z79.01]  Cerebral artery occlusion with cerebral infarction (H) [I63.50]  History of CVA (cerebrovascular accident) [Z86.73]         Your next Anticoagulation Clinic appointment(s)     Aug 23, 2018  9:30 AM CDT   Anticoagulation Visit with  ANTICOAGULATION CLINIC   [facility] ([facility])    [address]  [address]  [place] [facility] [place] [id number] [phone]              Contact Numbers     [facility]  Please call  [phone] to cancel and/or reschedule your appointment   Please call  [phone] with any problems or questions regarding your therapy.        July 2018 Details    Sun Mon Tue Wed Thu Fri Sat     1               2               3               4               5               6               7                 8               9               10               11               12               13               14                 15               16               17               18               19               20               21                 22               23               24               25               26               27      2.5 mg   See details      28      2.5 mg           29      2.5 mg         30      5 mg         31      2.5 mg              Date Details   07/27 This INR check               How to take your warfarin dose     To take:  2.5 mg Take 1 of the 2.5 mg tablets.    To take:  5 mg Take 2 of the 2.5 mg tablets.           August 2018 Details    Sun Mon Tue  Wed Thu Fri Sat        1      5 mg         2      2.5 mg         3      5 mg         4      2.5 mg           5      2.5 mg         6      5 mg         7      2.5 mg         8      5 mg         9      2.5 mg         10      5 mg         11      2.5 mg           12      2.5 mg         13      5 mg         14      2.5 mg         15      5 mg         16      2.5 mg         17      5 mg         18      2.5 mg           19      2.5 mg         20      5 mg         21      2.5 mg         22      5 mg         23            24               25                 26               27               28               29               30               31                 Date Details   No additional details    Date of next INR:  8/23/2018         How to take your warfarin dose     To take:  2.5 mg Take 1 of the 2.5 mg tablets.    To take:  5 mg Take 2 of the 2.5 mg tablets.

## 2018-07-27 NOTE — PROGRESS NOTES
ANTICOAGULATION FOLLOW-UP CLINIC VISIT    Patient Name:  Elise Mason  Date:  7/27/2018  Contact Type:  Face to Face    SUBJECTIVE:     Patient Findings     Positives Change in diet/appetite    Comments Ate less green vegetables than usual.  Will eat some broccoli tonight    Patient denies: extra doses, illness, inflammation,   bleeding/bruises, medication changes           OBJECTIVE    INR Protime   Date Value Ref Range Status   07/27/2018 3.3 (A) 0.86 - 1.14 Final       ASSESSMENT / PLAN  INR assessment SUPRA    Recheck INR In: 4 WEEKS    INR Location Clinic      Anticoagulation Summary as of 7/27/2018     INR goal 2.0-3.0   Today's INR 3.3!   Warfarin maintenance plan 5 mg (2.5 mg x 2) on Mon, Wed, Fri; 2.5 mg (2.5 mg x 1) all other days   Full warfarin instructions 7/27: 2.5 mg; Otherwise 5 mg on Mon, Wed, Fri; 2.5 mg all other days   Weekly warfarin total 25 mg   Plan last modified Fallon Velazco RN (2/9/2018)   Next INR check 8/23/2018   Target end date Indefinite    Indications   Long-term (current) use of anticoagulants [Z79.01]  Cerebral artery occlusion with cerebral infarction (H) [I63.50]  History of CVA (cerebrovascular accident) [Z86.73]         Anticoagulation Episode Summary     INR check location [facility]    Preferred lab     Send INR reminders to [facility]    Comments 2.5mg tabs - dyllan dose // CALENDAR       Anticoagulation Care Providers     Provider Role Specialty Phone number    Cyn Crow MD  Internal Medicine [phone]            See the Encounter Report to view Anticoagulation Flowsheet and Dosing Calendar (Go to Encounters tab in chart review, and find the Anticoagulation Therapy Visit)        Fallon Velazco, TANA

## 2018-08-23 ENCOUNTER — DOCUMENTATION ONLY (OUTPATIENT)
Dept: PEDIATRICS | Facility: CLINIC | Age: 73
End: 2018-08-23

## 2018-08-23 ENCOUNTER — ANTICOAGULATION THERAPY VISIT (OUTPATIENT)
Dept: NURSING | Facility: CLINIC | Age: 73
End: 2018-08-23
Payer: COMMERCIAL

## 2018-08-23 ENCOUNTER — OFFICE VISIT (OUTPATIENT)
Dept: PEDIATRICS | Facility: CLINIC | Age: 73
End: 2018-08-23
Payer: COMMERCIAL

## 2018-08-23 VITALS
SYSTOLIC BLOOD PRESSURE: 118 MMHG | DIASTOLIC BLOOD PRESSURE: 80 MMHG | OXYGEN SATURATION: 95 % | WEIGHT: 243 LBS | BODY MASS INDEX: 41.48 KG/M2 | TEMPERATURE: 99.5 F | HEART RATE: 89 BPM | HEIGHT: 64 IN

## 2018-08-23 DIAGNOSIS — Z79.01 LONG-TERM (CURRENT) USE OF ANTICOAGULANTS: Primary | ICD-10-CM

## 2018-08-23 DIAGNOSIS — Z86.73 HISTORY OF CVA (CEREBROVASCULAR ACCIDENT): ICD-10-CM

## 2018-08-23 DIAGNOSIS — C44.619 BCC (BASAL CELL CARCINOMA), ARM, LEFT: ICD-10-CM

## 2018-08-23 DIAGNOSIS — N25.81 SECONDARY RENAL HYPERPARATHYROIDISM (H): ICD-10-CM

## 2018-08-23 DIAGNOSIS — I10 BENIGN ESSENTIAL HYPERTENSION: ICD-10-CM

## 2018-08-23 DIAGNOSIS — E78.00 PURE HYPERCHOLESTEROLEMIA: ICD-10-CM

## 2018-08-23 DIAGNOSIS — Z79.01 LONG-TERM (CURRENT) USE OF ANTICOAGULANTS: ICD-10-CM

## 2018-08-23 DIAGNOSIS — Z94.0 KIDNEY REPLACED BY TRANSPLANT: ICD-10-CM

## 2018-08-23 DIAGNOSIS — I63.50 CEREBRAL ARTERY OCCLUSION WITH CEREBRAL INFARCTION (H): ICD-10-CM

## 2018-08-23 DIAGNOSIS — M81.0 OSTEOPOROSIS, UNSPECIFIED OSTEOPOROSIS TYPE, UNSPECIFIED PATHOLOGICAL FRACTURE PRESENCE: ICD-10-CM

## 2018-08-23 DIAGNOSIS — I10 ESSENTIAL HYPERTENSION: ICD-10-CM

## 2018-08-23 DIAGNOSIS — E66.01 MORBID OBESITY, UNSPECIFIED OBESITY TYPE (H): ICD-10-CM

## 2018-08-23 DIAGNOSIS — Z00.00 MEDICARE ANNUAL WELLNESS VISIT, SUBSEQUENT: Primary | ICD-10-CM

## 2018-08-23 DIAGNOSIS — D84.9 IMMUNOSUPPRESSED STATUS (H): ICD-10-CM

## 2018-08-23 DIAGNOSIS — Q61.3 POLYCYSTIC KIDNEY: ICD-10-CM

## 2018-08-23 LAB — INR POINT OF CARE: 2.9 (ref 0.86–1.14)

## 2018-08-23 PROCEDURE — 36416 COLLJ CAPILLARY BLOOD SPEC: CPT

## 2018-08-23 PROCEDURE — 85610 PROTHROMBIN TIME: CPT | Mod: QW

## 2018-08-23 PROCEDURE — 99207 ZZC NO CHARGE NURSE ONLY: CPT

## 2018-08-23 PROCEDURE — G0439 PPPS, SUBSEQ VISIT: HCPCS | Performed by: PEDIATRICS

## 2018-08-23 RX ORDER — CLONIDINE HYDROCHLORIDE 0.2 MG/1
0.2 TABLET ORAL 2 TIMES DAILY
Qty: 180 TABLET | Refills: 3 | Status: SHIPPED | OUTPATIENT
Start: 2018-08-23 | End: 2019-01-01

## 2018-08-23 RX ORDER — SIMVASTATIN 20 MG
20 TABLET ORAL AT BEDTIME
Qty: 90 TABLET | Refills: 3 | Status: SHIPPED | OUTPATIENT
Start: 2018-08-23 | End: 2019-01-01

## 2018-08-23 RX ORDER — FUROSEMIDE 80 MG
80 TABLET ORAL 2 TIMES DAILY
Qty: 180 TABLET | Refills: 3 | Status: SHIPPED | OUTPATIENT
Start: 2018-08-23 | End: 2019-01-01

## 2018-08-23 RX ORDER — DILTIAZEM HYDROCHLORIDE 240 MG/1
240 CAPSULE, EXTENDED RELEASE ORAL DAILY
Qty: 90 CAPSULE | Refills: 3 | Status: SHIPPED | OUTPATIENT
Start: 2018-08-23 | End: 2019-01-01

## 2018-08-23 ASSESSMENT — ANXIETY QUESTIONNAIRES
3. WORRYING TOO MUCH ABOUT DIFFERENT THINGS: NOT AT ALL
2. NOT BEING ABLE TO STOP OR CONTROL WORRYING: NOT AT ALL
5. BEING SO RESTLESS THAT IT IS HARD TO SIT STILL: NOT AT ALL
1. FEELING NERVOUS, ANXIOUS, OR ON EDGE: NOT AT ALL
IF YOU CHECKED OFF ANY PROBLEMS ON THIS QUESTIONNAIRE, HOW DIFFICULT HAVE THESE PROBLEMS MADE IT FOR YOU TO DO YOUR WORK, TAKE CARE OF THINGS AT HOME, OR GET ALONG WITH OTHER PEOPLE: NOT DIFFICULT AT ALL
6. BECOMING EASILY ANNOYED OR IRRITABLE: NOT AT ALL
GAD7 TOTAL SCORE: 0
7. FEELING AFRAID AS IF SOMETHING AWFUL MIGHT HAPPEN: NOT AT ALL

## 2018-08-23 ASSESSMENT — PATIENT HEALTH QUESTIONNAIRE - PHQ9: 5. POOR APPETITE OR OVEREATING: NOT AT ALL

## 2018-08-23 NOTE — PROGRESS NOTES
SUBJECTIVE:   Elise Mason is a 72 year old female who presents for Preventive Visit.      Are you in the first 12 months of your Medicare Part B coverage?  No    Healthy Habits:    Do you get at least three servings of calcium containing foods daily (dairy, green leafy vegetables, etc.)? yes    Amount of exercise or daily activities, outside of work: None     Problems taking medications regularly No    Medication side effects: No    Have you had an eye exam in the past two years? yes    Do you see a dentist twice per year? yes    Do you have sleep apnea, excessive snoring or daytime drowsiness?no      Ability to successfully perform activities of daily living: Yes, no assistance needed    Home safety:  none identified     Hearing impairment: No    Fall risk:  Fallen 2 or more times in the past year?: No  Any fall with injury in the past year?: No        COGNITIVE SCREEN  1) Repeat 3 items (Leader, Season, Table)    2) Clock draw: NORMAL  3) 3 item recall: Recalls 3 objects  Results: 3 items recalled: COGNITIVE IMPAIRMENT LESS LIKELY    Mini-CogTM Copyright MASOUD Callaway. Licensed by the author for use in [facility]; reprinted with permission ([email or web address]). All rights reserved.              Reviewed and updated as needed this visit by clinical staff  Tobacco  Allergies  Meds  Med Hx  Surg Hx  Fam Hx  Soc Hx        Reviewed and updated as needed this visit by Provider        Social History   Substance Use Topics     Smoking status: Former Smoker     Quit date: 1/1/1990     Smokeless tobacco: Never Used     Alcohol use Yes      Comment: 2-3x per year       If you drink alcohol do you typically have >3 drinks per day or >7 drinks per week? No                        Today's PHQ-2 Score:   PHQ-2 ( 1999 [employer]) 8/23/2018 8/10/2017   Q1: Little interest or pleasure in doing things 0 0   Q2: Feeling down, depressed or hopeless 0 0   PHQ-2 Score 0 0   Q1: Little interest or pleasure in doing things -  Not at all   Q2: Feeling down, depressed or hopeless - Not at all   PHQ-2 Score - 0       Do you feel safe in your environment - Yes    Do you have a Health Care Directive?: No: Advance care planning reviewed with patient; information given to patient to review.    Current providers sharing in care for this patient include:   Patient Care Team:  Cyn Crow MD as PCP - General (Internal Medicine)  Michel Ventura MD as MD (Nephrology)    The following health maintenance items are reviewed in Epic and correct as of today:  Health Maintenance   Topic Date Due     SARAH QUESTIONNAIRE 1 YEAR  09/26/1963     PHQ-9 Q1YR  09/26/1963     MEDICARE ANNUAL WELLNESS VISIT  08/10/2018     FALL RISK ASSESSMENT  08/10/2018     LIPID MONITORING Q1 YEAR  09/15/2018     INFLUENZA VACCINE (1) 09/01/2018     MAMMO Q1 YR  10/20/2018     BMP Q1 YR  02/24/2019     COLONOSCOPY Q10 YR  07/02/2019     DEXA Q2 YR  10/23/2019     TETANUS Q10 YR  08/09/2022     ADVANCE DIRECTIVE PLANNING Q5 YRS  03/02/2023     PNEUMOCOCCAL  Completed     HEPATITIS C SCREENING  Completed       Syncopal episode earlier this year without recurrence - reviewed hospital stay, work up, course since that time -  has been following with cardiology.  Mild irregularities on stress testing.  No recurrence of symptoms and has been taking medications as prescribed.  Has been working to walk more in the hallways of her building.    Transplanted kidney - now greater than 20 years out from her transplant and doing well.  Stable immunosuppression.  No recent infections.  Due for gengraf trough.  Follows with Dr Isidro.    Blood pressure has been well controlled.    Lost weight when moved to new place earlier this year and has maintained weight loss.    Hx of CVA - on warfarin - no recent concern for bleeding events.  No symptoms that have concerned her for recurrence  of stroke.    Hx of BCC - following with dermatology - no recent skin changes    UTD  "on cancer screening    Fosamax - tolerating well.  Hx of vertebral fractures - no new related pain.    Right ankle pain - intermittently swells and gets tender.  Not currently problematic.    LE edema - wears compression stockings and has been trying to elevated legs during the day more with improvement in symptoms.    Echocardiogram performed 2/2018.      ROS:  Constitutional, HEENT, cardiovascular, pulmonary, GI, , musculoskeletal, neuro, skin, endocrine and psych systems are negative, except as otherwise noted.    OBJECTIVE:   /80 (BP Location: Right arm, Patient Position: Right side, Cuff Size: Adult Large)  Pulse 89  Temp 99.5  F (37.5  C) (Tympanic)  Ht 5' 4\" (1.626 m)  Wt 243 lb (110.2 kg)  SpO2 95%  BMI 41.71 kg/m2 Estimated body mass index is 41.71 kg/(m^2) as calculated from the following:    Height as of this encounter: 5' 4\" (1.626 m).    Weight as of this encounter: 243 lb (110.2 kg).  EXAM:   GENERAL: alert, no distress, obese, very pleasant and talkative  EYES: Eyes grossly normal to inspection, PERRL and conjunctivae and sclerae normal  HENT: ear canals and TM's normal, nose and mouth without ulcers or lesions  NECK: no adenopathy, no asymmetry, masses, or scars and thyroid normal to palpation  RESP: lungs clear to auscultation - no rales, rhonchi or wheezes  CV: regular rates and rhythm, normal S1 S2, no S3 or S4, grade 2/6 abraham murmur heard best over the lusb and peripheral pulses strong  ABDOMEN: obese, +BS, no rebound or guarding  MS: no gross deformities, bilateral LE edema to knees - compression stockings in place  SKIN: no suspicious lesions or rashes  NEURO: mentation intact, oriented times 3, speech normal, cranial nerves 2-12 intact and gait slightly unsteady with need to use arms to rise from a seated position  PSYCH: mentation appears normal, affect normal/bright    Diagnostic Test Results:  pending    ASSESSMENT / PLAN:       ICD-10-CM    1. Medicare annual wellness visit, " subsequent Z00.00 Comprehensive metabolic panel     Parathyroid Hormone Intact     Phosphorus     CBC with platelets     *MA Screening Digital Bilateral     TSH with free T4 reflex     Hemoglobin A1c   2. Polycystic kidney Q61.3 Comprehensive metabolic panel   3. Essential hypertension I10 Comprehensive metabolic panel  Well controlled, continue current medications     4. Osteoporosis, unspecified osteoporosis type, unspecified pathological fracture presence M81.0 Continue fosamax   5. Long-term (current) use of anticoagulants Z79.01 Follows with INR clinic -doing well    6. Kidney replaced by transplant Z94.0 Parathyroid Hormone Intact     Phosphorus     CBC with platelets     diltiazem (TIAZAC) 240 MG 24 hr ER beaded capsule     cloNIDine (CATAPRES) 0.2 MG tablet     furosemide (LASIX) 80 MG tablet    Due for lab work for Dr Lorenzo camagro - she will return next week to have all lab work completed   7. History of CVA (cerebrovascular accident) Z86.73 On warfarin, no evidence of recurrence   8. Immunosuppressed status (H) D89.9 No current evidence for infection, discussed flu shot this fall   9. Morbid obesity, unspecified obesity type (H) E66.01 Comprehensive metabolic panel     Hemoglobin A1c     Lipid panel reflex to direct LDL Fasting   10. BCC (basal cell carcinoma), arm, left C44.619 Follows with dermatology, no new concerns   11. Secondary renal hyperparathyroidism (H) N25.81 Parathyroid Hormone Intact     Phosphorus   12. Pure hypercholesterolemia E78.00 Comprehensive metabolic panel     Lipid panel reflex to direct LDL Fasting     simvastatin (ZOCOR) 20 MG tablet   13. Benign essential hypertension I10 diltiazem (TIAZAC) 240 MG 24 hr ER beaded capsule     cloNIDine (CATAPRES) 0.2 MG tablet  Well controlled, continue current medications         End of Life Planning:  Patient currently has an advanced directive: Yes.  Practitioner is supportive of decision.    COUNSELING:  Special attention given to:        "Regular exercise       Healthy diet/nutrition       Vision screening       Osteoporosis Prevention/Bone Health    BP Readings from Last 1 Encounters:   08/23/18 118/80     Estimated body mass index is 41.71 kg/(m^2) as calculated from the following:    Height as of this encounter: 5' 4\" (1.626 m).    Weight as of this encounter: 243 lb (110.2 kg).      Weight management plan: Discussed healthy diet and exercise guidelines and patient will follow up in 12 months in clinic to re-evaluate.     reports that she quit smoking about 28 years ago. She has never used smokeless tobacco.      Appropriate preventive services were discussed with this patient, including applicable screening as appropriate for cardiovascular disease, diabetes, osteopenia/osteoporosis, and glaucoma.  As appropriate for age/gender, discussed screening for colorectal cancer, prostate cancer, breast cancer, and cervical cancer. Checklist reviewing preventive services available has been given to the patient.    Reviewed patients plan of care and provided an AVS. The Intermediate Care Plan ( asthma action plan, low back pain action plan, and migraine action plan) for Elise meets the Care Plan requirement. This Care Plan has been established and reviewed with the Patient.    Counseling Resources:  ATP IV Guidelines  Pooled Cohorts Equation Calculator  Breast Cancer Risk Calculator  FRAX Risk Assessment  ICSI Preventive Guidelines  Dietary Guidelines for Americans, 2010  USDA's MyPlate  ASA Prophylaxis  Lung CA Screening    Cyn Crow MD  [facility]  "

## 2018-08-23 NOTE — PROGRESS NOTES
Reimbursement rules require all INR Clinic patients to have a yearly renewal of an INR Clinic Referral order.  Referral must be signed by the PCP for each patient.      New dot phrase added to referral. <dot>inrref  Referral is pended. Please complete and sign.     Fallon Velazco RN

## 2018-08-23 NOTE — PATIENT INSTRUCTIONS
Flu shot in September or October    Come back for fasting labs when it works for you    Mammogram in late October or November    Refills at the pharmacy          Preventive Health Recommendations    Female Ages 65 +    Yearly exam:     See your health care provider every year in order to  o Review health changes.   o Discuss preventive care.    o Review your medicines if your doctor has prescribed any.      You no longer need a yearly Pap test unless you've had an abnormal Pap test in the past 10 years. If you have vaginal symptoms, such as bleeding or discharge, be sure to talk with your provider about a Pap test.      Every 1 to 2 years, have a mammogram.  If you are over 69, talk with your health care provider about whether or not you want to continue having screening mammograms.      Every 10 years, have a colonoscopy. Or, have a yearly FIT test (stool test). These exams will check for colon cancer.       Have a cholesterol test every 5 years, or more often if your doctor advises it.       Have a diabetes test (fasting glucose) every three years. If you are at risk for diabetes, you should have this test more often.       At age 65, have a bone density scan (DEXA) to check for osteoporosis (brittle bone disease).    Shots:    Get a flu shot each year.    Get a tetanus shot every 10 years.    Talk to your doctor about your pneumonia vaccines. There are now two you should receive - Pneumovax (PPSV 23) and Prevnar (PCV 13).    Talk to your pharmacist about the shingles vaccine.    Talk to your doctor about the hepatitis B vaccine.    Nutrition:     Eat at least 5 servings of fruits and vegetables each day.      Eat whole-grain bread, whole-wheat pasta and brown rice instead of white grains and rice.      Get adequate Calcium and Vitamin D.     Lifestyle    Exercise at least 150 minutes a week (30 minutes a day, 5 days a week). This will help you control your weight and prevent disease.      Limit alcohol to one  drink per day.      No smoking.       Wear sunscreen to prevent skin cancer.       See your dentist twice a year for an exam and cleaning.      See your eye doctor every 1 to 2 years to screen for conditions such as glaucoma, macular degeneration and cataracts.

## 2018-08-23 NOTE — PROGRESS NOTES
ANTICOAGULATION FOLLOW-UP CLINIC VISIT    Patient Name:  Elise Mason  Date:  8/23/2018  Contact Type:  Face to Face    SUBJECTIVE:     Patient Findings     Positives Bruising    Comments Small bruises on arms - unknown cause.           OBJECTIVE    INR Protime   Date Value Ref Range Status   08/23/2018 2.9 (A) 0.86 - 1.14 Final       ASSESSMENT / PLAN  INR assessment THER    Recheck INR In: 6 WEEKS    INR Location Clinic      Anticoagulation Summary as of 8/23/2018     INR goal 2.0-3.0   Today's INR 2.9   Warfarin maintenance plan 5 mg (2.5 mg x 2) on Mon, Wed, Fri; 2.5 mg (2.5 mg x 1) all other days   Full warfarin instructions 5 mg on Mon, Wed, Fri; 2.5 mg all other days   Weekly warfarin total 25 mg   No change documented Fallon Velazco RN   Plan last modified Fallon Velazco RN (2/9/2018)   Next INR check 10/4/2018   Target end date Indefinite    Indications   Long-term (current) use of anticoagulants [Z79.01]  Cerebral artery occlusion with cerebral infarction (H) [I63.50]  History of CVA (cerebrovascular accident) [Z86.73]         Anticoagulation Episode Summary     INR check location [facility]    Preferred lab     Send INR reminders to [facility]    Comments 2.5mg tabs - dyllan dose // CALENDAR       Anticoagulation Care Providers     Provider Role Specialty Phone number    Cyn Crow MD  Internal Medicine [phone]            See the Encounter Report to view Anticoagulation Flowsheet and Dosing Calendar (Go to Encounters tab in chart review, and find the Anticoagulation Therapy Visit)        Fallon Velazco RN

## 2018-08-23 NOTE — MR AVS SNAPSHOT
After Visit Summary   8/23/2018    Elise Mason    MRN: [id number]           Patient Information     Date Of Birth          1945        Visit Information        Provider Department      8/23/2018 8:40 AM Cyn Crow MD [facility]        Today's Diagnoses     Secondary renal hyperparathyroidism (H)    -  1    Polycystic kidney        Essential hypertension        Osteoporosis, unspecified osteoporosis type, unspecified pathological fracture presence        Long-term (current) use of anticoagulants        Kidney replaced by transplant        History of CVA (cerebrovascular accident)        Immunosuppressed status (H)        Morbid obesity, unspecified obesity type (H)        BCC (basal cell carcinoma), arm, left        Medicare annual wellness visit, subsequent        Pure hypercholesterolemia        Benign essential hypertension          Care Instructions    Flu shot in September or October    Come back for fasting labs when it works for you    Mammogram in late October or November    Refills at the pharmacy          Preventive Health Recommendations    Female Ages 65 +    Yearly exam:     See your health care provider every year in order to  o Review health changes.   o Discuss preventive care.    o Review your medicines if your doctor has prescribed any.      You no longer need a yearly Pap test unless you've had an abnormal Pap test in the past 10 years. If you have vaginal symptoms, such as bleeding or discharge, be sure to talk with your provider about a Pap test.      Every 1 to 2 years, have a mammogram.  If you are over 69, talk with your health care provider about whether or not you want to continue having screening mammograms.      Every 10 years, have a colonoscopy. Or, have a yearly FIT test (stool test). These exams will check for colon cancer.       Have a cholesterol test every 5 years, or more often if your doctor advises it.       Have a diabetes test  (fasting glucose) every three years. If you are at risk for diabetes, you should have this test more often.       At age 65, have a bone density scan (DEXA) to check for osteoporosis (brittle bone disease).    Shots:    Get a flu shot each year.    Get a tetanus shot every 10 years.    Talk to your doctor about your pneumonia vaccines. There are now two you should receive - Pneumovax (PPSV 23) and Prevnar (PCV 13).    Talk to your pharmacist about the shingles vaccine.    Talk to your doctor about the hepatitis B vaccine.    Nutrition:     Eat at least 5 servings of fruits and vegetables each day.      Eat whole-grain bread, whole-wheat pasta and brown rice instead of white grains and rice.      Get adequate Calcium and Vitamin D.     Lifestyle    Exercise at least 150 minutes a week (30 minutes a day, 5 days a week). This will help you control your weight and prevent disease.      Limit alcohol to one drink per day.      No smoking.       Wear sunscreen to prevent skin cancer.       See your dentist twice a year for an exam and cleaning.      See your eye doctor every 1 to 2 years to screen for conditions such as glaucoma, macular degeneration and cataracts.          Follow-ups after your visit        Your next 10 appointments already scheduled     Aug 23, 2018  9:30 AM CDT   Anticoagulation Visit with  ANTICOAGULATION CLINIC   [facility] ([facility]    [address] [place] [address] [phone]              Future tests that were ordered for you today     Open Future Orders        Priority Expected Expires Ordered    Comprehensive metabolic panel Routine  8/23/2019 8/23/2018    Parathyroid Hormone Intact Routine  8/23/2019 8/23/2018    Phosphorus Routine  8/23/2019 8/23/2018    CBC with platelets Routine  8/23/2019 8/23/2018    *MA Screening Digital Bilateral Routine  8/23/2019 8/23/2018    TSH with free T4 reflex Routine  8/23/2019 8/23/2018     "Hemoglobin A1c Routine  8/23/2019 8/23/2018    Lipid panel reflex to direct LDL Fasting Routine 8/23/2018 8/23/2019 8/23/2018            Who to contact     If you have questions or need follow up information about today's clinic visit or your schedule please contact [facility] directly at [phone].  Normal or non-critical lab and imaging results will be communicated to you by MyChart, letter or phone within 4 business days after the clinic has received the results. If you do not hear from us within 7 days, please contact the clinic through [employer] or phone. If you have a critical or abnormal lab result, we will notify you by phone as soon as possible.  Submit refill requests through [employer] or call your pharmacy and they will forward the refill request to us. Please allow 3 business days for your refill to be completed.          Additional Information About Your Visit        [employer] Information     [employer] gives you secure access to your electronic health record. If you see a primary care provider, you can also send messages to your care team and make appointments. If you have questions, please call your primary care clinic.  If you do not have a primary care provider, please call [phone] and they will assist you.        Care EveryWhere ID     This is your Care EveryWhere ID. This could be used by other organizations to access your [place] medical records  [id number]        Your Vitals Were     Pulse Temperature Height Pulse Oximetry BMI (Body Mass Index)       89 99.5  F (37.5  C) (Tympanic) 5' 4\" (1.626 m) 95% 41.71 kg/m2        Blood Pressure from Last 3 Encounters:   08/23/18 118/80   07/18/18 110/60   04/11/18 118/60    Weight from Last 3 Encounters:   08/23/18 243 lb (110.2 kg)   07/18/18 244 lb 1.6 oz (110.7 kg)   04/11/18 246 lb 12.8 oz (111.9 kg)                 Where to get your medicines      These medications were sent to [facility] - [place], [place] - [address] [place] [address] " [facility]  [address] [facility] [facility] [address]     Phone:  [phone]     cloNIDine 0.2 MG tablet    diltiazem 240 MG 24 hr ER beaded capsule    furosemide 80 MG tablet    simvastatin 20 MG tablet          Primary Care Provider Office Phone # Fax #    Cyn Crow [phone] [address] [place] [address]        Equal Access to Services     [facility]: Hadii aad ku hadasho Soomaali, waaxda luqadaha, qaybta kaalmada adeegyada, waxay idiin hayaan adeeg kharash la'aan ah. So [facility] [phone].    ATENCIÓN: Si nette raines, tiene a mehta disposición servicios gratuitos de asistencia lingüística. Llame al [phone].    We comply with applicable federal civil rights laws and [place] laws. We do not discriminate on the basis of race, color, national origin, age, disability, sex, sexual orientation, or gender identity.            Thank you!     Thank you for choosing [facility]  for your care. Our goal is always to provide you with excellent care. Hearing back from our patients is one way we can continue to improve our services. Please take a few minutes to complete the written survey that you may receive in the mail after your visit with us. Thank you!             Your Updated Medication List - Protect others around you: Learn how to safely use, store and throw away your medicines at www.disposemymeds.org.          This list is accurate as of 8/23/18  9:21 AM.  Always use your most recent med list.                   Brand Name Dispense Instructions for use Diagnosis    * ACE NOT PRESCRIBED (INTENTIONAL)      by Other route continuous prn.    Polycystic kidney, unspecified type, Kidney replaced by transplant       alendronate 70 MG tablet    FOSAMAX    12 tablet    Take 1 tablet (70 mg) by mouth once a week    Age related osteoporosis, unspecified pathological fracture presence       amoxicillin 500 MG capsule    AMOXIL    4 capsule    Take 1 capsule  (500 mg) by mouth daily as needed    Kidney replaced by transplant       * ARB NOT PRESCRIBED (INTENTIONAL)      by Other route continuous prn.    Polycystic kidney, unspecified type, Kidney replaced by transplant       ASPIRIN NOT PRESCRIBED    INTENTIONAL    0 each    1 each continuous prn for other Antiplatelet medication not prescribed intentionally due to Current anticoagulant therapy (warfarin/enoxaparin)    History of CVA (cerebrovascular accident)       azaTHIOprine 50 MG tablet    IMURAN    90    Take three tablets by mouth daily        betamethasone dipropionate 0.05 % cream    DIPROSONE    45 g    Apply sparingly to affected area twice daily as needed.  Do not apply to face.    Dermatitis       calcitRIOL 0.5 MCG capsule    ROCALTROL    90 capsule    Take 1 capsule (0.5 mcg) by mouth daily    Kidney replaced by transplant       cloNIDine 0.2 MG tablet    CATAPRES    180 tablet    Take 1 tablet (0.2 mg) by mouth 2 times daily    Kidney replaced by transplant, Benign essential hypertension       cyanocolbalamin 500 MCG tablet    vitamin  B-12     OTC    1 tab daily per transplant clinic        cycloSPORINE modified 25 MG capsule     540 capsule    Take 3 capsules by mouth 2 times daily.    Kidney replaced by transplant       diltiazem 240 MG 24 hr ER beaded capsule    TIAZAC    90 capsule    Take 1 capsule (240 mg) by mouth daily    Kidney replaced by transplant, Benign essential hypertension       felodipine ER 2.5 MG 24 hr tablet    PLENDIL    90 tablet    TAKE 1 TABLET DAILY    Kidney replaced by transplant, Benign essential hypertension       ferrous sulfate 325 (65 Fe) MG tablet    IRON    90 tablet    Take 1 tablet by mouth daily.    Kidney replaced by transplant       folic acid 1 MG tablet    FOLVITE    180 tablet    TAKE 2 TABLETS (2,000 MCG) BY MOUTH DAILY    Kidney replaced by transplant       furosemide 80 MG tablet    LASIX    180 tablet    Take 1 tablet (80 mg) by mouth 2 times daily    Kidney  replaced by transplant       GLUCOSAMINE SULFATE PO      Take 750 mLs by mouth daily        nitroGLYcerin 0.4 MG sublingual tablet    NITROSTAT    25 tablet    For chest pain place 1 tablet under the tongue every 5 minutes for 3 doses. If symptoms persist 5 minutes after 1st dose call 911.    Coronary artery disease of native artery of native heart with stable angina pectoris (H)       OMEGA 3 550 MG Caps   Generic drug:  LINOLENIC ACID      Take 2,000 mg by mouth 2 times daily.        * order for DME     2 each    Please dispense 2 pair of knee high compression 20-30 stockings    Kidney replaced by transplant, Polycystic kidney, unspecified type, Edema       PROAIR  (90 Base) MCG/ACT inhaler   Generic drug:  albuterol     8.5 g    USE 2 INHALATIONS EVERY 4 HOURS AS NEEDED FOR SHORTNESS OF BREATH, DYSPNEA OR WHEEZING    Dyspnea on exertion       pyridoxine 100 MG tablet    VITAMIN B-6     OTC   2 tabs daily per transplant clinic        simvastatin 20 MG tablet    ZOCOR    90 tablet    Take 1 tablet (20 mg) by mouth At Bedtime    Pure hypercholesterolemia       sulfamethoxazole-trimethoprim 400-80 MG per tablet    BACTRIM    90 tablet    Take 1 tablet by mouth daily    Kidney replaced by transplant       TUMS PO      Take 2 tablets by mouth 3 times daily.        warfarin 2.5 MG tablet    COUMADIN    115 tablet    TAKE 2 TABLETS (5 MG) MONDAY AND FRIDAY AND 1 TABLET (2.5 MG) TUESDAY, WEDNESDAY, THURSDAY, SATURDAY AND SUNDAY OR AS DIRECTED BY INR CLINIC    Long-term (current) use of anticoagulants, History of CVA (cerebrovascular accident)       * Notice:  This list has 3 medication(s) that are the same as other medications prescribed for you. Read the directions carefully, and ask your doctor or other care provider to review them with you.

## 2018-08-23 NOTE — MR AVS SNAPSHOT
Elies Mason   8/23/2018 9:30 AM   Anticoagulation Therapy Visit    Description:  72 year old female   Provider:  [facility]   Department:   Nurse           INR as of 8/23/2018     Today's INR 2.9      Anticoagulation Summary as of 8/23/2018     INR goal 2.0-3.0   Today's INR 2.9   Full warfarin instructions 5 mg on Mon, Wed, Fri; 2.5 mg all other days   Next INR check 10/4/2018    Indications   Long-term (current) use of anticoagulants [Z79.01]  Cerebral artery occlusion with cerebral infarction (H) [I63.50]  History of CVA (cerebrovascular accident) [Z86.73]         Your next Anticoagulation Clinic appointment(s)     Oct 04, 2018 10:00 AM CDT   Anticoagulation Visit with  ANTICOAGULATION CLINIC   [facility] ([facility])    [address] [place] [address]  [address]  [facility] [address] [phone]              Contact Numbers     [facility]  Please call  [phone] to cancel and/or reschedule your appointment   Please call  [phone] with any problems or questions regarding your therapy.        August 2018 Details    Sun Mon Tue Wed Thu Fri Sat        1               2               3               4                 5               6               7               8               9               10               11                 12               13               14               15               16               17               18                 19               20               21               22               23      2.5 mg   See details      24      5 mg         25      2.5 mg           26      2.5 mg         27      5 mg         28      2.5 mg         29      5 mg         30      2.5 mg         31      5 mg           Date Details   08/23 This INR check               How to take your warfarin dose     To take:  2.5 mg Take 1 of the 2.5 mg tablets.    To take:  5 mg Take 2 of the 2.5 mg tablets.           September 2018 Details    Sun Mon Tue Wed  Thu Fri Sat           1      2.5 mg           2      2.5 mg         3      5 mg         4      2.5 mg         5      5 mg         6      2.5 mg         7      5 mg         8      2.5 mg           9      2.5 mg         10      5 mg         11      2.5 mg         12      5 mg         13      2.5 mg         14      5 mg         15      2.5 mg           16      2.5 mg         17      5 mg         18      2.5 mg         19      5 mg         20      2.5 mg         21      5 mg         22      2.5 mg           23      2.5 mg         24      5 mg         25      2.5 mg         26      5 mg         27      2.5 mg         28      5 mg         29      2.5 mg           30      2.5 mg                Date Details   No additional details            How to take your warfarin dose     To take:  2.5 mg Take 1 of the 2.5 mg tablets.    To take:  5 mg Take 2 of the 2.5 mg tablets.           October 2018 Details    Sun Mon Tue Wed Thu Fri Sat      1      5 mg         2      2.5 mg         3      5 mg         4            5               6                 7               8               9               10               11               12               13                 14               15               16               17               18               19               20                 21               22               23               24               25               26               27                 28               29               30               31                   Date Details   No additional details    Date of next INR:  10/4/2018         How to take your warfarin dose     To take:  2.5 mg Take 1 of the 2.5 mg tablets.    To take:  5 mg Take 2 of the 2.5 mg tablets.

## 2018-08-24 ASSESSMENT — PATIENT HEALTH QUESTIONNAIRE - PHQ9: SUM OF ALL RESPONSES TO PHQ QUESTIONS 1-9: 2

## 2018-08-24 ASSESSMENT — ANXIETY QUESTIONNAIRES: GAD7 TOTAL SCORE: 0

## 2018-09-05 DIAGNOSIS — E66.01 MORBID OBESITY, UNSPECIFIED OBESITY TYPE (H): ICD-10-CM

## 2018-09-05 DIAGNOSIS — Z94.0 KIDNEY REPLACED BY TRANSPLANT: ICD-10-CM

## 2018-09-05 DIAGNOSIS — N25.81 SECONDARY RENAL HYPERPARATHYROIDISM (H): ICD-10-CM

## 2018-09-05 DIAGNOSIS — I10 ESSENTIAL HYPERTENSION: ICD-10-CM

## 2018-09-05 DIAGNOSIS — Q61.3 POLYCYSTIC KIDNEY: ICD-10-CM

## 2018-09-05 DIAGNOSIS — Z00.00 MEDICARE ANNUAL WELLNESS VISIT, SUBSEQUENT: ICD-10-CM

## 2018-09-05 DIAGNOSIS — E78.00 PURE HYPERCHOLESTEROLEMIA: ICD-10-CM

## 2018-09-05 LAB
ALBUMIN SERPL-MCNC: 3.7 G/DL (ref 3.4–5)
ALP SERPL-CCNC: 71 U/L (ref 40–150)
ALT SERPL W P-5'-P-CCNC: 20 U/L (ref 0–50)
ANION GAP SERPL CALCULATED.3IONS-SCNC: 10 MMOL/L (ref 3–14)
AST SERPL W P-5'-P-CCNC: 27 U/L (ref 0–45)
BILIRUB SERPL-MCNC: 0.7 MG/DL (ref 0.2–1.3)
BUN SERPL-MCNC: 38 MG/DL (ref 7–30)
CALCIUM SERPL-MCNC: 10.4 MG/DL (ref 8.5–10.1)
CHLORIDE SERPL-SCNC: 103 MMOL/L (ref 94–109)
CHOLEST SERPL-MCNC: 144 MG/DL
CO2 SERPL-SCNC: 29 MMOL/L (ref 20–32)
CREAT SERPL-MCNC: 1.86 MG/DL (ref 0.52–1.04)
ERYTHROCYTE [DISTWIDTH] IN BLOOD BY AUTOMATED COUNT: 14.9 % (ref 10–15)
GFR SERPL CREATININE-BSD FRML MDRD: 27 ML/MIN/1.7M2
GLUCOSE SERPL-MCNC: 124 MG/DL (ref 70–99)
HBA1C MFR BLD: 5.7 % (ref 0–5.6)
HCT VFR BLD AUTO: 41.3 % (ref 35–47)
HDLC SERPL-MCNC: 82 MG/DL
HGB BLD-MCNC: 13.5 G/DL (ref 11.7–15.7)
LDLC SERPL CALC-MCNC: 33 MG/DL
MCH RBC QN AUTO: 31 PG (ref 26.5–33)
MCHC RBC AUTO-ENTMCNC: 32.7 G/DL (ref 31.5–36.5)
MCV RBC AUTO: 95 FL (ref 78–100)
NONHDLC SERPL-MCNC: 62 MG/DL
PHOSPHATE SERPL-MCNC: 2.3 MG/DL (ref 2.5–4.5)
PLATELET # BLD AUTO: 166 10E9/L (ref 150–450)
POTASSIUM SERPL-SCNC: 4.1 MMOL/L (ref 3.4–5.3)
PROT SERPL-MCNC: 7.5 G/DL (ref 6.8–8.8)
PTH-INTACT SERPL-MCNC: 96 PG/ML (ref 18–80)
RBC # BLD AUTO: 4.36 10E12/L (ref 3.8–5.2)
SODIUM SERPL-SCNC: 142 MMOL/L (ref 133–144)
TRIGL SERPL-MCNC: 144 MG/DL
TSH SERPL DL<=0.005 MIU/L-ACNC: 3.04 MU/L (ref 0.4–4)
WBC # BLD AUTO: 8.2 10E9/L (ref 4–11)

## 2018-09-05 PROCEDURE — 83970 ASSAY OF PARATHORMONE: CPT | Performed by: PEDIATRICS

## 2018-09-05 PROCEDURE — 84100 ASSAY OF PHOSPHORUS: CPT | Performed by: PEDIATRICS

## 2018-09-05 PROCEDURE — 80158 DRUG ASSAY CYCLOSPORINE: CPT | Performed by: PEDIATRICS

## 2018-09-05 PROCEDURE — 84443 ASSAY THYROID STIM HORMONE: CPT | Performed by: PEDIATRICS

## 2018-09-05 PROCEDURE — 36415 COLL VENOUS BLD VENIPUNCTURE: CPT | Performed by: PEDIATRICS

## 2018-09-05 PROCEDURE — 83036 HEMOGLOBIN GLYCOSYLATED A1C: CPT | Performed by: PEDIATRICS

## 2018-09-05 PROCEDURE — 80061 LIPID PANEL: CPT | Performed by: PEDIATRICS

## 2018-09-05 PROCEDURE — 85027 COMPLETE CBC AUTOMATED: CPT | Performed by: PEDIATRICS

## 2018-09-05 PROCEDURE — 80053 COMPREHEN METABOLIC PANEL: CPT | Performed by: PEDIATRICS

## 2018-09-07 LAB
CYCLOSPORINE BLD LC/MS/MS-MCNC: 96 UG/L (ref 50–400)
TME LAST DOSE: NORMAL H

## 2018-09-24 DIAGNOSIS — M81.0 AGE RELATED OSTEOPOROSIS, UNSPECIFIED PATHOLOGICAL FRACTURE PRESENCE: ICD-10-CM

## 2018-09-24 NOTE — TELEPHONE ENCOUNTER
"Requested Prescriptions   Pending Prescriptions Disp Refills     alendronate (FOSAMAX) 70 MG tablet [Pharmacy Med Name: ALENDRONATE SOD TABS 4'S 70MG]    Last Written Prescription Date:  12/1/2017  Last Fill Quantity: 12,  # refills: 2   Last office visit: 8/23/2018 with prescribing provider:  Cyn Crow     Future Office Visit:     12 tablet 2     Sig: TAKE 1 TABLET ONCE A WEEK    Bisphosphonates Failed    9/24/2018 11:33 AM       Failed - Normal serum creatinine on file within past 12 months    Recent Labs   Lab Test  09/05/18   1008   CR  1.86*            Passed - Recent (12 mo) or future (30 days) visit within the authorizing provider's specialty    Patient had office visit in the last 12 months or has a visit in the next 30 days with authorizing provider or within the authorizing provider's specialty.  See \"Patient Info\" tab in inbasket, or \"Choose Columns\" in Meds & Orders section of the refill encounter.           Passed - Dexa on file within past 2 years    Please review last Dexa result.          Passed - Patient is age 18 or older          "

## 2018-09-26 RX ORDER — ALENDRONATE SODIUM 70 MG/1
TABLET ORAL
Qty: 12 TABLET | Refills: 2 | OUTPATIENT
Start: 2018-09-26

## 2018-09-26 NOTE — TELEPHONE ENCOUNTER
Based on patient's most recent Creatinine Clearance (measure of her kidney function), I would recommend that she stop her fosamax.   I will not refill it today and will take it off her medication list.  Please let her know.      Cyn Crow MD  Internal Medicine - Pediatrics

## 2018-09-26 NOTE — TELEPHONE ENCOUNTER
Routing refill request to provider for review/approval because:  Labs out of range:  Creatinine  Loraine MENDIOLA RN

## 2018-09-27 RX ORDER — ALENDRONATE SODIUM 70 MG/1
70 TABLET ORAL WEEKLY
Qty: 12 TABLET | Refills: 2 | OUTPATIENT
Start: 2018-09-27

## 2018-09-27 NOTE — TELEPHONE ENCOUNTER
Updated patient on below, she agrees with plan. She will no longer take Fosamax.     I also updated [facility].

## 2018-10-01 DIAGNOSIS — Z94.0 KIDNEY REPLACED BY TRANSPLANT: ICD-10-CM

## 2018-10-01 NOTE — TELEPHONE ENCOUNTER
Requested Prescriptions   Pending Prescriptions Disp Refills     calcitRIOL (ROCALTROL) 0.5 MCG capsule [Pharmacy Med Name: CALCITRIOL CAPS 0.5MCG]        Last Written Prescription Date:  12/1/2017  Last Fill Quantity: 90,   # refills: 2  Last Office Visit: 8/23/2018  Future Office visit:       Routing refill request to provider for review/approval because:  Drug not on the [facility], [facility] or [facility] refill protocol or controlled substance   90 capsule 2     Sig: TAKE 1 CAPSULE DAILY    There is no refill protocol information for this order

## 2018-10-03 RX ORDER — CALCITRIOL 0.5 UG/1
CAPSULE, LIQUID FILLED ORAL
Qty: 90 CAPSULE | Refills: 3 | Status: SHIPPED | OUTPATIENT
Start: 2018-10-03 | End: 2019-01-01

## 2018-10-03 NOTE — TELEPHONE ENCOUNTER
Routing refill request to provider for review/approval because:  Drug not on the [facility] refill protocol     Lori Mclaughlin RN

## 2018-10-04 ENCOUNTER — ANTICOAGULATION THERAPY VISIT (OUTPATIENT)
Dept: NURSING | Facility: CLINIC | Age: 73
End: 2018-10-04
Payer: COMMERCIAL

## 2018-10-04 DIAGNOSIS — I63.50 CEREBRAL ARTERY OCCLUSION WITH CEREBRAL INFARCTION (H): ICD-10-CM

## 2018-10-04 DIAGNOSIS — Z79.01 LONG TERM CURRENT USE OF ANTICOAGULANT THERAPY: Primary | ICD-10-CM

## 2018-10-04 DIAGNOSIS — Z86.73 HISTORY OF CVA (CEREBROVASCULAR ACCIDENT): ICD-10-CM

## 2018-10-04 LAB — INR POINT OF CARE: 2.7 (ref 0.86–1.14)

## 2018-10-04 PROCEDURE — 85610 PROTHROMBIN TIME: CPT | Mod: QW

## 2018-10-04 PROCEDURE — 99207 ZZC NO CHARGE NURSE ONLY: CPT

## 2018-10-04 PROCEDURE — 36416 COLLJ CAPILLARY BLOOD SPEC: CPT

## 2018-10-04 NOTE — MR AVS SNAPSHOT
Elise Mason   10/4/2018 10:00 AM   Anticoagulation Therapy Visit    Description:  73 year old female   Provider:  [facility]   Department:   Nurse           INR as of 10/4/2018     Today's INR 2.7      Anticoagulation Summary as of 10/4/2018     INR goal 2.0-3.0   Today's INR 2.7   Full warfarin instructions 5 mg on Mon, Wed, Fri; 2.5 mg all other days   Next INR check 11/15/2018    Indications   Long-term (current) use of anticoagulants [Z79.01]  Cerebral artery occlusion with cerebral infarction (H) [I63.50]  History of CVA (cerebrovascular accident) [Z86.73]         Your next Anticoagulation Clinic appointment(s)     Nov 15, 2018 10:00 AM CST   Anticoagulation Visit with  ANTICOAGULATION CLINIC   [facility] ([facility])    [address] [place] [address]  [address]  [facility] [address] [phone]              Contact Numbers     [facility]  Please call  [phone] to cancel and/or reschedule your appointment   Please call  [phone] with any problems or questions regarding your therapy.        October 2018 Details    Sun Mon Tue Wed Thu Fri Sat      1               2               3               4      2.5 mg   See details      5      5 mg         6      2.5 mg           7      2.5 mg         8      5 mg         9      2.5 mg         10      5 mg         11      2.5 mg         12      5 mg         13      2.5 mg           14      2.5 mg         15      5 mg         16      2.5 mg         17      5 mg         18      2.5 mg         19      5 mg         20      2.5 mg           21      2.5 mg         22      5 mg         23      2.5 mg         24      5 mg         25      2.5 mg         26      5 mg         27      2.5 mg           28      2.5 mg         29      5 mg         30      2.5 mg         31      5 mg             Date Details   10/04 This INR check               How to take your warfarin dose     To take:  2.5 mg Take 1 of the 2.5 mg  tablets.    To take:  5 mg Take 2 of the 2.5 mg tablets.           November 2018 Details    Sun Mon Tue Wed Thu Fri Sat         1      2.5 mg         2      5 mg         3      2.5 mg           4      2.5 mg         5      5 mg         6      2.5 mg         7      5 mg         8      2.5 mg         9      5 mg         10      2.5 mg           11      2.5 mg         12      5 mg         13      2.5 mg         14      5 mg         15            16               17                 18               19               20               21               22               23               24                 25               26               27               28               29               30                 Date Details   No additional details    Date of next INR:  11/15/2018         How to take your warfarin dose     To take:  2.5 mg Take 1 of the 2.5 mg tablets.    To take:  5 mg Take 2 of the 2.5 mg tablets.

## 2018-10-04 NOTE — PROGRESS NOTES
ANTICOAGULATION FOLLOW-UP CLINIC VISIT    Patient Name:  Elise Mason  Date:  10/4/2018  Contact Type:  Face to Face    SUBJECTIVE:     Patient Findings     Positives No Problem Findings    Comments She is getting a flu shot today.           OBJECTIVE    INR Protime   Date Value Ref Range Status   10/04/2018 2.7 (A) 0.86 - 1.14 Final       ASSESSMENT / PLAN  INR assessment THER    Recheck INR In: 6 WEEKS    INR Location Clinic      Anticoagulation Summary as of 10/4/2018     INR goal 2.0-3.0   Today's INR 2.7   Warfarin maintenance plan 5 mg (2.5 mg x 2) on Mon, Wed, Fri; 2.5 mg (2.5 mg x 1) all other days   Full warfarin instructions 5 mg on Mon, Wed, Fri; 2.5 mg all other days   Weekly warfarin total 25 mg   No change documented Fallon Velazco RN   Plan last modified Fallon Velazco RN (2/9/2018)   Next INR check 11/15/2018   Target end date Indefinite    Indications   Long-term (current) use of anticoagulants [Z79.01]  Cerebral artery occlusion with cerebral infarction (H) [I63.50]  History of CVA (cerebrovascular accident) [Z86.73]         Anticoagulation Episode Summary     [facility]    Preferred lab     Send INR reminders to [facility]    Comments 2.5mg tabs - dyllan dose // CALENDAR       Anticoagulation Care Providers     Provider Role Specialty Phone number    Cyn Crow MD  Internal Medicine [phone]            See the Encounter Report to view Anticoagulation Flowsheet and Dosing Calendar (Go to Encounters tab in chart review, and find the Anticoagulation Therapy Visit)        Fallon Velazco RN

## 2018-10-22 ENCOUNTER — RADIANT APPOINTMENT (OUTPATIENT)
Dept: MAMMOGRAPHY | Facility: CLINIC | Age: 73
End: 2018-10-22
Attending: PEDIATRICS
Payer: COMMERCIAL

## 2018-10-22 PROCEDURE — 77067 SCR MAMMO BI INCL CAD: CPT | Mod: TC

## 2018-11-05 NOTE — TELEPHONE ENCOUNTER
"Requested Prescriptions   Pending Prescriptions Disp Refills     warfarin (COUMADIN) 2.5 MG tablet [Pharmacy Med Name: WARFARIN TABS 2.5MG]    Last Written Prescription Date:  5/21/2018  Last Fill Quantity: 115,  # refills: 1   Last office visit: 8/23/2018 with prescribing provider:  Cyn Crow     Future Office Visit:     115 tablet 1     Sig: TAKE 2 TABLETS (5 MG) MONDAY AND FRIDAY AND 1 TABLET (2.5 MG) TUESDAY, WEDNESDAY, THURSDAY, SATURDAY AND SUNDAY OR AS DIRECTED BY INR CLINIC    Vitamin K Antagonists Failed    11/5/2018  2:20 PM       Failed - INR is within goal in the past 6 weeks    Confirm INR is within goal in the past 6 weeks.     Recent Labs   Lab Test 10/04/18   INR  2.7*                      Passed - Recent (12 mo) or future (30 days) visit within the authorizing provider's specialty    Patient had office visit in the last 12 months or has a visit in the next 30 days with authorizing provider or within the authorizing provider's specialty.  See \"Patient Info\" tab in inbasket, or \"Choose Columns\" in Meds & Orders section of the refill encounter.             Passed - Patient is 18 years of age or older       Passed - Patient is not pregnant       Passed - No positive pregnancy on file in past 12 months          "

## 2018-11-13 NOTE — TELEPHONE ENCOUNTER
"Requested Prescriptions   Pending Prescriptions Disp Refills     sulfamethoxazole-trimethoprim (BACTRIM/SEPTRA) 400-80 MG per tablet [Pharmacy Med Name: SULFAMETH & TMP TABS 400/80]      Last Written Prescription Date:  12/1/2017  Last Fill Quantity: 90,  # refills: 2  Last office visit: 8/23/2018 with prescribing provider:  Cyn Crow     Future Office Visit:     90 tablet 2     Sig: TAKE 1 TABLET DAILY    Oral Acne/Rosacea Medications Protocol Failed    11/12/2018 11:51 AM       Failed - Confirmation of diagnosis is required    Please confirm diagnosis is acne or rosacea.     If NOT acne or rosacea; refer request to provider for further evaluation.    If diagnosis IS acne or rosacea, OK to refill BASED ON PREVIOUS REFILL CLINICAL NOTE RECOMMENDATION.         Passed - Patient is 12 years of age or older       Passed - Recent (12 mo) or future (30 days) visit within the authorizing provider's specialty    Patient had office visit in the last 12 months or has a visit in the next 30 days with authorizing provider or within the authorizing provider's specialty.  See \"Patient Info\" tab in inbasket, or \"Choose Columns\" in Meds & Orders section of the refill encounter.             Passed - No active pregnancy on record       Passed - No positive prenancy test is past 12 months          "

## 2018-11-15 NOTE — PROGRESS NOTES
ANTICOAGULATION FOLLOW-UP CLINIC VISIT    Patient Name:  Elise Mason  Date:  11/15/2018  Contact Type:  Face to Face    SUBJECTIVE:     Patient Findings     Positives No Problem Findings           OBJECTIVE    INR Protime   Date Value Ref Range Status   11/15/2018 2.8 (A) 0.86 - 1.14 Final       ASSESSMENT / PLAN  INR assessment THER    Recheck INR In: 6 WEEKS    INR Location Clinic      Anticoagulation Summary as of 11/15/2018     INR goal 2.0-3.0   Today's INR 2.8   Warfarin maintenance plan 5 mg (2.5 mg x 2) on Mon, Wed, Fri; 2.5 mg (2.5 mg x 1) all other days   Full warfarin instructions 5 mg on Mon, Wed, Fri; 2.5 mg all other days   Weekly warfarin total 25 mg   No change documented Fallon Velazco RN   Plan last modified Fallon Velazco RN (2/9/2018)   Next INR check 12/27/2018   Target end date Indefinite    Indications   Long term current use of anticoagulant therapy [Z79.01]  Cerebral artery occlusion with cerebral infarction (H) [I63.50]  History of CVA (cerebrovascular accident) [Z86.73]         Anticoagulation Episode Summary     [facility]    Preferred lab     Send INR reminders to [facility]    Comments 2.5mg tabs - dyllan dose // CALENDAR // RF 5/6/19      Anticoagulation Care Providers     Provider Role Specialty Phone number    Cyn Crow MD  Internal Medicine [phone]            See the Encounter Report to view Anticoagulation Flowsheet and Dosing Calendar (Go to Encounters tab in chart review, and find the Anticoagulation Therapy Visit)        Fallon Vleazco RN

## 2018-11-15 NOTE — TELEPHONE ENCOUNTER
Covering for partner. Not sure if this is for pcp ppx (not on prednisone) or recurrent cystitis. Will refill and route to PCP for review and she can send additional refills if indicated.    DIXON Ingram MD  Internal Medicine-Pediatrics

## 2018-11-15 NOTE — MR AVS SNAPSHOT
Elise Mason   11/15/2018 10:00 AM   Anticoagulation Therapy Visit    Description:  73 year old female   Provider:  [facility]   Department:   Nurse           INR as of 11/15/2018     Today's INR 2.8      Anticoagulation Summary as of 11/15/2018     INR goal 2.0-3.0   Today's INR 2.8   Full warfarin instructions 5 mg on Mon, Wed, Fri; 2.5 mg all other days   Next INR check 12/27/2018    Indications   Long term current use of anticoagulant therapy [Z79.01]  Cerebral artery occlusion with cerebral infarction (H) [I63.50]  History of CVA (cerebrovascular accident) [Z86.73]         Your next Anticoagulation Clinic appointment(s)     Dec 27, 2018 11:00 AM CST   Anticoagulation Visit with  ANTICOAGULATION CLINIC   [facility] ([facility])    [address]  [address]  [facility] [id number] [phone]              Contact Numbers     [facility]  Please call  [phone] to cancel and/or reschedule your appointment   Please call  [phone] with any problems or questions regarding your therapy.        November 2018 Details    Sun Mon Tue Wed Thu Fri Sat         1               2               3                 4               5               6               7               8               9               10                 11               12               13               14               15      2.5 mg   See details      16      5 mg         17      2.5 mg           18      2.5 mg         19      5 mg         20      2.5 mg         21      5 mg         22      2.5 mg         23      5 mg         24      2.5 mg           25      2.5 mg         26      5 mg         27      2.5 mg         28      5 mg         29      2.5 mg         30      5 mg           Date Details   11/15 This INR check               How to take your warfarin dose     To take:  2.5 mg Take 1 of the 2.5 mg tablets.    To take:  5 mg Take 2 of the 2.5 mg tablets.           December  2018 Details    Sun Mon Tue Wed Thu Fri Sat           1      2.5 mg           2      2.5 mg         3      5 mg         4      2.5 mg         5      5 mg         6      2.5 mg         7      5 mg         8      2.5 mg           9      2.5 mg         10      5 mg         11      2.5 mg         12      5 mg         13      2.5 mg         14      5 mg         15      2.5 mg           16      2.5 mg         17      5 mg         18      2.5 mg         19      5 mg         20      2.5 mg         21      5 mg         22      2.5 mg           23      2.5 mg         24      5 mg         25      2.5 mg         26      5 mg         27            28               29                 30               31                     Date Details   No additional details    Date of next INR:  12/27/2018         How to take your warfarin dose     To take:  2.5 mg Take 1 of the 2.5 mg tablets.    To take:  5 mg Take 2 of the 2.5 mg tablets.

## 2018-12-27 NOTE — PROGRESS NOTES
ANTICOAGULATION FOLLOW-UP CLINIC VISIT    Patient Name:  Elise Mason  Date:  2018  Contact Type:  Face to Face    SUBJECTIVE:     Patient Findings     Positives:   No Problem Findings           OBJECTIVE    INR Protime   Date Value Ref Range Status   2018 2.6 (A) 0.86 - 1.14 Final       ASSESSMENT / PLAN  INR assessment THER    Recheck INR In: 6 WEEKS    INR Location Clinic      Anticoagulation Summary  As of 2018    INR goal:   2.0-3.0   TTR:   75.1 % (2.8 y)   INR used for dosin.6 (2018)   Warfarin maintenance plan:   5 mg (2.5 mg x 2) every Mon, Wed, Fri; 2.5 mg (2.5 mg x 1) all other days   Full warfarin instructions:   5 mg every Mon, Wed, Fri; 2.5 mg all other days   Weekly warfarin total:   25 mg   No change documented:   Fallon Velazco RN   Plan last modified:   Fallon Velazco RN (2018)   Next INR check:   2019   Target end date:   Indefinite    Indications    Long term current use of anticoagulant therapy [Z79.01]  Cerebral artery occlusion with cerebral infarction (H) [I63.50]  History of CVA (cerebrovascular accident) [Z86.73]             Anticoagulation Episode Summary     [facility]   [facility]    Preferred lab:       Send INR reminders to:   [facility]    Comments:   2.5mg tabs - dyllan dose // CALENDAR // RF 19      Anticoagulation Care Providers     Provider Role Specialty Phone number    Cyn Crow MD  Internal Medicine [phone]            See the Encounter Report to view Anticoagulation Flowsheet and Dosing Calendar (Go to Encounters tab in chart review, and find the Anticoagulation Therapy Visit)        Fallon Velazco RN

## 2019-01-01 ENCOUNTER — TELEPHONE (OUTPATIENT)
Dept: PEDIATRICS | Facility: CLINIC | Age: 74
End: 2019-01-01

## 2019-01-01 ENCOUNTER — OFFICE VISIT (OUTPATIENT)
Dept: CARDIOLOGY | Facility: CLINIC | Age: 74
End: 2019-01-01
Attending: INTERNAL MEDICINE
Payer: COMMERCIAL

## 2019-01-01 ENCOUNTER — ANTICOAGULATION THERAPY VISIT (OUTPATIENT)
Dept: NURSING | Facility: CLINIC | Age: 74
End: 2019-01-01
Payer: COMMERCIAL

## 2019-01-01 ENCOUNTER — ANCILLARY PROCEDURE (OUTPATIENT)
Dept: BONE DENSITY | Facility: CLINIC | Age: 74
End: 2019-01-01
Attending: PEDIATRICS
Payer: COMMERCIAL

## 2019-01-01 ENCOUNTER — HOSPITAL ENCOUNTER (EMERGENCY)
Facility: CLINIC | Age: 74
End: 2019-10-29
Attending: EMERGENCY MEDICINE | Admitting: EMERGENCY MEDICINE
Payer: COMMERCIAL

## 2019-01-01 ENCOUNTER — TELEPHONE (OUTPATIENT)
Dept: TRANSPLANT | Facility: CLINIC | Age: 74
End: 2019-01-01

## 2019-01-01 ENCOUNTER — ANCILLARY PROCEDURE (OUTPATIENT)
Dept: MAMMOGRAPHY | Facility: CLINIC | Age: 74
End: 2019-01-01
Attending: PEDIATRICS
Payer: COMMERCIAL

## 2019-01-01 ENCOUNTER — TRANSFERRED RECORDS (OUTPATIENT)
Dept: HEALTH INFORMATION MANAGEMENT | Facility: CLINIC | Age: 74
End: 2019-01-01

## 2019-01-01 ENCOUNTER — OFFICE VISIT (OUTPATIENT)
Dept: PEDIATRICS | Facility: CLINIC | Age: 74
End: 2019-01-01
Payer: COMMERCIAL

## 2019-01-01 VITALS
HEART RATE: 61 BPM | SYSTOLIC BLOOD PRESSURE: 118 MMHG | HEIGHT: 64 IN | TEMPERATURE: 97.8 F | WEIGHT: 249 LBS | DIASTOLIC BLOOD PRESSURE: 60 MMHG | OXYGEN SATURATION: 95 % | BODY MASS INDEX: 42.51 KG/M2 | RESPIRATION RATE: 16 BRPM

## 2019-01-01 VITALS
DIASTOLIC BLOOD PRESSURE: 95 MMHG | RESPIRATION RATE: 22 BRPM | HEART RATE: 68 BPM | OXYGEN SATURATION: 86 % | SYSTOLIC BLOOD PRESSURE: 151 MMHG

## 2019-01-01 VITALS
SYSTOLIC BLOOD PRESSURE: 118 MMHG | DIASTOLIC BLOOD PRESSURE: 62 MMHG | WEIGHT: 243 LBS | HEIGHT: 64 IN | OXYGEN SATURATION: 97 % | HEART RATE: 72 BPM | BODY MASS INDEX: 41.48 KG/M2

## 2019-01-01 DIAGNOSIS — I63.50 CEREBRAL ARTERY OCCLUSION WITH CEREBRAL INFARCTION (H): ICD-10-CM

## 2019-01-01 DIAGNOSIS — I10 BENIGN ESSENTIAL HYPERTENSION: ICD-10-CM

## 2019-01-01 DIAGNOSIS — Z78.0 ASYMPTOMATIC POSTMENOPAUSAL STATUS: ICD-10-CM

## 2019-01-01 DIAGNOSIS — E78.00 PURE HYPERCHOLESTEROLEMIA: ICD-10-CM

## 2019-01-01 DIAGNOSIS — D84.9 IMMUNOSUPPRESSED STATUS (H): ICD-10-CM

## 2019-01-01 DIAGNOSIS — Z79.01 LONG TERM CURRENT USE OF ANTICOAGULANT THERAPY: ICD-10-CM

## 2019-01-01 DIAGNOSIS — Q61.3 POLYCYSTIC KIDNEY: ICD-10-CM

## 2019-01-01 DIAGNOSIS — Z86.73 HISTORY OF CVA (CEREBROVASCULAR ACCIDENT): ICD-10-CM

## 2019-01-01 DIAGNOSIS — N25.81 SECONDARY RENAL HYPERPARATHYROIDISM (H): ICD-10-CM

## 2019-01-01 DIAGNOSIS — Z85.89 HX OF SQUAMOUS CELL CARCINOMA: ICD-10-CM

## 2019-01-01 DIAGNOSIS — Z94.0 KIDNEY REPLACED BY TRANSPLANT: Primary | ICD-10-CM

## 2019-01-01 DIAGNOSIS — Z86.73 HISTORY OF CVA (CEREBROVASCULAR ACCIDENT): Primary | ICD-10-CM

## 2019-01-01 DIAGNOSIS — Z79.01 LONG TERM CURRENT USE OF ANTICOAGULANT THERAPY: Primary | ICD-10-CM

## 2019-01-01 DIAGNOSIS — I49.01 VENTRICULAR FIBRILLATION (H): ICD-10-CM

## 2019-01-01 DIAGNOSIS — Z94.0 KIDNEY REPLACED BY TRANSPLANT: ICD-10-CM

## 2019-01-01 DIAGNOSIS — N39.0 RECURRENT UTI: ICD-10-CM

## 2019-01-01 DIAGNOSIS — Z78.0 MENOPAUSE: ICD-10-CM

## 2019-01-01 DIAGNOSIS — Q44.6 POLYCYSTIC LIVER DISEASE: ICD-10-CM

## 2019-01-01 DIAGNOSIS — I46.9 CARDIAC ARREST (H): ICD-10-CM

## 2019-01-01 DIAGNOSIS — R55 SYNCOPE AND COLLAPSE: ICD-10-CM

## 2019-01-01 DIAGNOSIS — M81.0 OSTEOPOROSIS, UNSPECIFIED OSTEOPOROSIS TYPE, UNSPECIFIED PATHOLOGICAL FRACTURE PRESENCE: ICD-10-CM

## 2019-01-01 DIAGNOSIS — I47.29 PAROXYSMAL VENTRICULAR TACHYCARDIA (H): Primary | ICD-10-CM

## 2019-01-01 DIAGNOSIS — Z00.00 MEDICARE ANNUAL WELLNESS VISIT, SUBSEQUENT: Primary | ICD-10-CM

## 2019-01-01 DIAGNOSIS — R73.01 IMPAIRED FASTING GLUCOSE: ICD-10-CM

## 2019-01-01 DIAGNOSIS — M81.0 OSTEOPOROSIS, POST-MENOPAUSAL: ICD-10-CM

## 2019-01-01 DIAGNOSIS — Z12.31 VISIT FOR SCREENING MAMMOGRAM: ICD-10-CM

## 2019-01-01 DIAGNOSIS — R06.09 DYSPNEA ON EXERTION: ICD-10-CM

## 2019-01-01 DIAGNOSIS — Z12.11 SPECIAL SCREENING FOR MALIGNANT NEOPLASMS, COLON: ICD-10-CM

## 2019-01-01 DIAGNOSIS — I25.118 CORONARY ARTERY DISEASE OF NATIVE ARTERY OF NATIVE HEART WITH STABLE ANGINA PECTORIS (H): ICD-10-CM

## 2019-01-01 DIAGNOSIS — E83.52 HYPERCALCEMIA: ICD-10-CM

## 2019-01-01 LAB
ABO + RH BLD: NORMAL
ABO + RH BLD: NORMAL
ALBUMIN SERPL-MCNC: 3.4 G/DL (ref 3.4–5)
ALBUMIN SERPL-MCNC: 3.5 G/DL (ref 3.4–5)
ALP SERPL-CCNC: 69 U/L (ref 40–150)
ALP SERPL-CCNC: 80 U/L (ref 40–150)
ALT SERPL W P-5'-P-CCNC: 19 U/L (ref 0–50)
ALT SERPL W P-5'-P-CCNC: 21 U/L (ref 0–50)
ANION GAP SERPL CALCULATED.3IONS-SCNC: 15 MMOL/L (ref 6–17)
ANION GAP SERPL CALCULATED.3IONS-SCNC: 6 MMOL/L (ref 3–14)
ANION GAP SERPL CALCULATED.3IONS-SCNC: 7 MMOL/L (ref 3–14)
APTT PPP: 41 SEC (ref 22–37)
AST SERPL W P-5'-P-CCNC: 23 U/L (ref 0–45)
AST SERPL W P-5'-P-CCNC: 23 U/L (ref 0–45)
BILIRUB SERPL-MCNC: 0.6 MG/DL (ref 0.2–1.3)
BILIRUB SERPL-MCNC: 0.7 MG/DL (ref 0.2–1.3)
BLD GP AB SCN SERPL QL: NORMAL
BLOOD BANK CMNT PATIENT-IMP: NORMAL
BUN SERPL-MCNC: 38 MG/DL (ref 7–30)
BUN SERPL-MCNC: 42 MG/DL (ref 7–30)
BUN SERPL-MCNC: 43 MG/DL (ref 7–30)
CA-I BLD-SCNC: 5 MG/DL (ref 4.4–5.2)
CALCIUM SERPL-MCNC: 10.5 MG/DL (ref 8.5–10.1)
CALCIUM SERPL-MCNC: 10.7 MG/DL (ref 8.5–10.1)
CHLORIDE BLD-SCNC: 103 MMOL/L (ref 94–109)
CHLORIDE SERPL-SCNC: 104 MMOL/L (ref 94–109)
CHLORIDE SERPL-SCNC: 106 MMOL/L (ref 94–109)
CO2 BLD-SCNC: 25 MMOL/L (ref 20–32)
CO2 BLDCOV-SCNC: 22 MMOL/L (ref 21–28)
CO2 SERPL-SCNC: 30 MMOL/L (ref 20–32)
CO2 SERPL-SCNC: 30 MMOL/L (ref 20–32)
CREAT BLD-MCNC: 2.5 MG/DL (ref 0.52–1.04)
CREAT SERPL-MCNC: 1.71 MG/DL (ref 0.52–1.04)
CREAT SERPL-MCNC: 2.14 MG/DL (ref 0.52–1.04)
CYCLOSPORINE BLD LC/MS/MS-MCNC: 87 UG/L (ref 50–400)
CYCLOSPORINE BLD LC/MS/MS-MCNC: 93 UG/L (ref 50–400)
ERYTHROCYTE [DISTWIDTH] IN BLOOD BY AUTOMATED COUNT: 14.8 % (ref 10–15)
ERYTHROCYTE [DISTWIDTH] IN BLOOD BY AUTOMATED COUNT: 14.8 % (ref 10–15)
ERYTHROCYTE [DISTWIDTH] IN BLOOD BY AUTOMATED COUNT: 15.3 % (ref 10–15)
GFR SERPL CREATININE-BSD FRML MDRD: 19 ML/MIN/{1.73_M2}
GFR SERPL CREATININE-BSD FRML MDRD: 22 ML/MIN/{1.73_M2}
GFR SERPL CREATININE-BSD FRML MDRD: 29 ML/MIN/{1.73_M2}
GLUCOSE BLD-MCNC: 291 MG/DL (ref 70–99)
GLUCOSE BLDC GLUCOMTR-MCNC: 176 MG/DL (ref 70–99)
GLUCOSE SERPL-MCNC: 109 MG/DL (ref 70–99)
GLUCOSE SERPL-MCNC: 133 MG/DL (ref 70–99)
HCT VFR BLD AUTO: 39 % (ref 35–47)
HCT VFR BLD AUTO: 39.7 % (ref 35–47)
HCT VFR BLD AUTO: 40.7 % (ref 35–47)
HCT VFR BLD CALC: 36 %PCV (ref 35–47)
HGB BLD CALC-MCNC: 12.2 G/DL (ref 11.7–15.7)
HGB BLD-MCNC: 12.2 G/DL (ref 11.7–15.7)
HGB BLD-MCNC: 12.8 G/DL (ref 11.7–15.7)
HGB BLD-MCNC: 13.2 G/DL (ref 11.7–15.7)
INR POINT OF CARE: 2.1 (ref 0.86–1.14)
INR POINT OF CARE: 2.2 (ref 0.86–1.14)
INR POINT OF CARE: 2.3 (ref 0.86–1.14)
INR POINT OF CARE: 2.5 (ref 0.86–1.14)
INR POINT OF CARE: 2.6 (ref 0.86–1.14)
INR POINT OF CARE: 2.6 (ref 0.86–1.14)
INR POINT OF CARE: 2.7 (ref 0.86–1.14)
INR PPP: 3.08 (ref 0.86–1.14)
LACTATE BLD-SCNC: 12.2 MMOL/L (ref 0.7–2.1)
LACTATE BLD-SCNC: 12.5 MMOL/L (ref 0.7–2)
MCH RBC QN AUTO: 30.6 PG (ref 26.5–33)
MCH RBC QN AUTO: 30.9 PG (ref 26.5–33)
MCH RBC QN AUTO: 31.1 PG (ref 26.5–33)
MCHC RBC AUTO-ENTMCNC: 30.7 G/DL (ref 31.5–36.5)
MCHC RBC AUTO-ENTMCNC: 32.4 G/DL (ref 31.5–36.5)
MCHC RBC AUTO-ENTMCNC: 32.8 G/DL (ref 31.5–36.5)
MCV RBC AUTO: 101 FL (ref 78–100)
MCV RBC AUTO: 94 FL (ref 78–100)
MCV RBC AUTO: 95 FL (ref 78–100)
PCO2 BLDV: 76 MM HG (ref 40–50)
PH BLDV: 7.06 PH (ref 7.32–7.43)
PHOSPHATE SERPL-MCNC: 2.3 MG/DL (ref 2.5–4.5)
PHOSPHATE SERPL-MCNC: 2.7 MG/DL (ref 2.5–4.5)
PLATELET # BLD AUTO: 153 10E9/L (ref 150–450)
PLATELET # BLD AUTO: 163 10E9/L (ref 150–450)
PLATELET # BLD AUTO: 175 10E9/L (ref 150–450)
PO2 BLDV: 47 MM HG (ref 25–47)
POTASSIUM BLD-SCNC: 3.6 MMOL/L (ref 3.4–5.3)
POTASSIUM SERPL-SCNC: 3.8 MMOL/L (ref 3.4–5.3)
POTASSIUM SERPL-SCNC: 3.8 MMOL/L (ref 3.4–5.3)
PROT SERPL-MCNC: 7.2 G/DL (ref 6.8–8.8)
PROT SERPL-MCNC: 7.4 G/DL (ref 6.8–8.8)
PTH-INTACT SERPL-MCNC: 57 PG/ML (ref 18–80)
PTH-INTACT SERPL-MCNC: 68 PG/ML (ref 18–80)
RBC # BLD AUTO: 3.95 10E12/L (ref 3.8–5.2)
RBC # BLD AUTO: 4.12 10E12/L (ref 3.8–5.2)
RBC # BLD AUTO: 4.31 10E12/L (ref 3.8–5.2)
SAO2 % BLDV FROM PO2: 63 %
SODIUM BLD-SCNC: 143 MMOL/L (ref 133–144)
SODIUM SERPL-SCNC: 141 MMOL/L (ref 133–144)
SODIUM SERPL-SCNC: 142 MMOL/L (ref 133–144)
SPECIMEN EXP DATE BLD: NORMAL
TACROLIMUS BLD-MCNC: <3 UG/L (ref 5–15)
TME LAST DOSE: ABNORMAL H
TME LAST DOSE: NORMAL H
TME LAST DOSE: NORMAL H
TROPONIN I BLD-MCNC: 0.77 UG/L (ref 0–0.08)
WBC # BLD AUTO: 7.5 10E9/L (ref 4–11)
WBC # BLD AUTO: 8.3 10E9/L (ref 4–11)
WBC # BLD AUTO: 9.6 10E9/L (ref 4–11)

## 2019-01-01 PROCEDURE — 25000125 ZZHC RX 250

## 2019-01-01 PROCEDURE — 90662 IIV NO PRSV INCREASED AG IM: CPT | Performed by: PEDIATRICS

## 2019-01-01 PROCEDURE — 85610 PROTHROMBIN TIME: CPT | Mod: QW

## 2019-01-01 PROCEDURE — 25000128 H RX IP 250 OP 636

## 2019-01-01 PROCEDURE — 40000256 ZZH STATISTIC CARDIOPULM RESUSCITATION

## 2019-01-01 PROCEDURE — 77081 DXA BONE DENSITY APPENDICULR: CPT | Mod: 59 | Performed by: FAMILY MEDICINE

## 2019-01-01 PROCEDURE — 25000125 ZZHC RX 250: Performed by: EMERGENCY MEDICINE

## 2019-01-01 PROCEDURE — 83970 ASSAY OF PARATHORMONE: CPT | Performed by: PEDIATRICS

## 2019-01-01 PROCEDURE — 99285 EMERGENCY DEPT VISIT HI MDM: CPT | Mod: 25

## 2019-01-01 PROCEDURE — 99207 ZZC NO CHARGE NURSE ONLY: CPT

## 2019-01-01 PROCEDURE — 85027 COMPLETE CBC AUTOMATED: CPT | Performed by: PEDIATRICS

## 2019-01-01 PROCEDURE — 99207 C PAF COMPLETED  NO CHARGE: CPT | Performed by: PEDIATRICS

## 2019-01-01 PROCEDURE — 82803 BLOOD GASES ANY COMBINATION: CPT

## 2019-01-01 PROCEDURE — 80158 DRUG ASSAY CYCLOSPORINE: CPT | Performed by: PEDIATRICS

## 2019-01-01 PROCEDURE — 80053 COMPREHEN METABOLIC PANEL: CPT | Performed by: PEDIATRICS

## 2019-01-01 PROCEDURE — 84100 ASSAY OF PHOSPHORUS: CPT | Performed by: PEDIATRICS

## 2019-01-01 PROCEDURE — G0008 ADMIN INFLUENZA VIRUS VAC: HCPCS | Performed by: PEDIATRICS

## 2019-01-01 PROCEDURE — 77067 SCR MAMMO BI INCL CAD: CPT | Mod: TC

## 2019-01-01 PROCEDURE — 83605 ASSAY OF LACTIC ACID: CPT

## 2019-01-01 PROCEDURE — 96368 THER/DIAG CONCURRENT INF: CPT | Mod: 59

## 2019-01-01 PROCEDURE — 85730 THROMBOPLASTIN TIME PARTIAL: CPT | Performed by: EMERGENCY MEDICINE

## 2019-01-01 PROCEDURE — 92950 HEART/LUNG RESUSCITATION CPR: CPT

## 2019-01-01 PROCEDURE — 86850 RBC ANTIBODY SCREEN: CPT | Performed by: EMERGENCY MEDICINE

## 2019-01-01 PROCEDURE — 77085 DXA BONE DENSITY AXL VRT FX: CPT | Performed by: FAMILY MEDICINE

## 2019-01-01 PROCEDURE — 40000275 ZZH STATISTIC RCP TIME EA 10 MIN

## 2019-01-01 PROCEDURE — 80047 BASIC METABLC PNL IONIZED CA: CPT

## 2019-01-01 PROCEDURE — 96365 THER/PROPH/DIAG IV INF INIT: CPT | Mod: 59

## 2019-01-01 PROCEDURE — 36416 COLLJ CAPILLARY BLOOD SPEC: CPT

## 2019-01-01 PROCEDURE — 80197 ASSAY OF TACROLIMUS: CPT | Performed by: PEDIATRICS

## 2019-01-01 PROCEDURE — 83605 ASSAY OF LACTIC ACID: CPT | Performed by: EMERGENCY MEDICINE

## 2019-01-01 PROCEDURE — 85027 COMPLETE CBC AUTOMATED: CPT | Performed by: EMERGENCY MEDICINE

## 2019-01-01 PROCEDURE — 85014 HEMATOCRIT: CPT

## 2019-01-01 PROCEDURE — 25800030 ZZH RX IP 258 OP 636: Performed by: EMERGENCY MEDICINE

## 2019-01-01 PROCEDURE — 99397 PER PM REEVAL EST PAT 65+ YR: CPT | Mod: 25 | Performed by: PEDIATRICS

## 2019-01-01 PROCEDURE — 86900 BLOOD TYPING SEROLOGIC ABO: CPT | Performed by: EMERGENCY MEDICINE

## 2019-01-01 PROCEDURE — 84484 ASSAY OF TROPONIN QUANT: CPT

## 2019-01-01 PROCEDURE — 00000146 ZZHCL STATISTIC GLUCOSE BY METER IP

## 2019-01-01 PROCEDURE — 86901 BLOOD TYPING SEROLOGIC RH(D): CPT | Performed by: EMERGENCY MEDICINE

## 2019-01-01 PROCEDURE — 99214 OFFICE O/P EST MOD 30 MIN: CPT | Performed by: INTERNAL MEDICINE

## 2019-01-01 PROCEDURE — 85610 PROTHROMBIN TIME: CPT | Performed by: EMERGENCY MEDICINE

## 2019-01-01 RX ORDER — WARFARIN SODIUM 2.5 MG/1
TABLET ORAL
Qty: 130 TABLET | Refills: 0 | Status: SHIPPED | OUTPATIENT
Start: 2019-01-01

## 2019-01-01 RX ORDER — SULFAMETHOXAZOLE AND TRIMETHOPRIM 400; 80 MG/1; MG/1
1 TABLET ORAL DAILY
Qty: 90 TABLET | Refills: 3 | Status: SHIPPED | OUTPATIENT
Start: 2019-01-01

## 2019-01-01 RX ORDER — NOREPINEPHRINE BITARTRATE 0.06 MG/ML
0.03-0.4 INJECTION, SOLUTION INTRAVENOUS CONTINUOUS
Status: DISCONTINUED | OUTPATIENT
Start: 2019-01-01 | End: 2019-01-01 | Stop reason: HOSPADM

## 2019-01-01 RX ORDER — FELODIPINE 2.5 MG/1
TABLET, EXTENDED RELEASE ORAL
Qty: 90 TABLET | Refills: 0 | Status: SHIPPED | OUTPATIENT
Start: 2019-01-01 | End: 2019-01-01

## 2019-01-01 RX ORDER — AMOXICILLIN 500 MG/1
CAPSULE ORAL
Qty: 4 CAPSULE | Refills: 6 | Status: SHIPPED | OUTPATIENT
Start: 2019-01-01

## 2019-01-01 RX ORDER — SIMVASTATIN 20 MG
20 TABLET ORAL AT BEDTIME
Qty: 90 TABLET | Refills: 3 | Status: SHIPPED | OUTPATIENT
Start: 2019-01-01

## 2019-01-01 RX ORDER — CLONIDINE HYDROCHLORIDE 0.2 MG/1
0.2 TABLET ORAL 2 TIMES DAILY
Qty: 180 TABLET | Refills: 3 | Status: SHIPPED | OUTPATIENT
Start: 2019-01-01

## 2019-01-01 RX ORDER — CLONIDINE HYDROCHLORIDE 0.2 MG/1
TABLET ORAL
Qty: 180 TABLET | Refills: 0 | Status: SHIPPED | OUTPATIENT
Start: 2019-01-01 | End: 2019-01-01

## 2019-01-01 RX ORDER — SULFAMETHOXAZOLE AND TRIMETHOPRIM 400; 80 MG/1; MG/1
1 TABLET ORAL DAILY
Qty: 30 TABLET | Refills: 0 | Status: SHIPPED | OUTPATIENT
Start: 2019-01-01 | End: 2019-01-01

## 2019-01-01 RX ORDER — ALBUTEROL SULFATE 90 UG/1
AEROSOL, METERED RESPIRATORY (INHALATION)
Qty: 8.5 G | Refills: 3 | Status: SHIPPED | OUTPATIENT
Start: 2019-01-01

## 2019-01-01 RX ORDER — FUROSEMIDE 80 MG
80 TABLET ORAL 2 TIMES DAILY
Qty: 180 TABLET | Refills: 3 | Status: SHIPPED | OUTPATIENT
Start: 2019-01-01

## 2019-01-01 RX ORDER — FELODIPINE 2.5 MG/1
2.5 TABLET, EXTENDED RELEASE ORAL DAILY
Qty: 90 TABLET | Refills: 3 | Status: SHIPPED | OUTPATIENT
Start: 2019-01-01

## 2019-01-01 RX ORDER — WARFARIN SODIUM 2.5 MG/1
TABLET ORAL
Qty: 130 TABLET | Refills: 1 | Status: SHIPPED | OUTPATIENT
Start: 2019-01-01 | End: 2019-01-01

## 2019-01-01 RX ORDER — CALCITRIOL 0.5 UG/1
CAPSULE, LIQUID FILLED ORAL
Qty: 90 CAPSULE | Refills: 4 | Status: SHIPPED | OUTPATIENT
Start: 2019-01-01

## 2019-01-01 RX ORDER — DILTIAZEM HYDROCHLORIDE 240 MG/1
240 CAPSULE, EXTENDED RELEASE ORAL DAILY
Qty: 90 CAPSULE | Refills: 3 | Status: SHIPPED | OUTPATIENT
Start: 2019-01-01

## 2019-01-01 RX ORDER — AMOXICILLIN 500 MG/1
CAPSULE ORAL
Qty: 4 CAPSULE | Refills: 1 | Status: SHIPPED | OUTPATIENT
Start: 2019-01-01 | End: 2019-01-01

## 2019-01-01 RX ADMIN — SODIUM BICARBONATE: 84 INJECTION, SOLUTION INTRAVENOUS at 13:51

## 2019-01-01 RX ADMIN — Medication 0.4 MCG/KG/MIN: at 13:46

## 2019-01-01 ASSESSMENT — MIFFLIN-ST. JEOR
SCORE: 1614.46
SCORE: 1592.24

## 2019-01-01 ASSESSMENT — ACTIVITIES OF DAILY LIVING (ADL): CURRENT_FUNCTION: NO ASSISTANCE NEEDED

## 2019-01-29 NOTE — TELEPHONE ENCOUNTER
"Requested Prescriptions   Pending Prescriptions Disp Refills     amoxicillin (AMOXIL) 500 MG capsule [Pharmacy Med Name: AMOXICILLIN CAPS 500MG]    Last Written Prescription Date:  1/3/2018  Last Fill Quantity: 4,  # refills: 1   Last office visit: 8/23/2018 with prescribing provider:  Cyn Crow     Future Office Visit:     4 capsule 1     Sig: TAKE 1 CAPSULE DAILY AS NEEDED    Oral Acne/Rosacea Medications Protocol Failed - 1/29/2019  2:15 PM       Failed - Confirmation of diagnosis is required    Please confirm diagnosis is acne or rosacea.     If NOT acne or rosacea; refer request to provider for further evaluation.    If diagnosis IS acne or rosacea, OK to refill BASED ON PREVIOUS REFILL CLINICAL NOTE RECOMMENDATION.         Passed - Patient is 12 years of age or older       Passed - Recent (12 mo) or future (30 days) visit within the authorizing provider's specialty    Patient had office visit in the last 12 months or has a visit in the next 30 days with authorizing provider or within the authorizing provider's specialty.  See \"Patient Info\" tab in inbasket, or \"Choose Columns\" in Meds & Orders section of the refill encounter.             Passed - Medication is active on med list       Passed - No active pregnancy on record       Passed - No positive prenancy test is past 12 months          "

## 2019-01-31 NOTE — TELEPHONE ENCOUNTER
Routing refill request to provider for review/approval because:  Drug not on the [facility] refill protocol.   Taking for: KIDNEY TRANSPLANT STATUS [Z94.0]     *Annual visit done on 8/23/18    Veronica Johnson RN Flex

## 2019-02-15 NOTE — TELEPHONE ENCOUNTER
If out today, needs to get at local pharmacy and prescription sent there.  Typically this is done by transplant doctor and bactrim is usually take 3x/wk.  Should have follow-up with nephrology/transplant

## 2019-02-15 NOTE — TELEPHONE ENCOUNTER
Routing to PCP - daily bactrim seems unusual and would typically be done by transplant doc.  1 month only

## 2019-02-15 NOTE — TELEPHONE ENCOUNTER
"Requested Prescriptions   Pending Prescriptions Disp Refills     sulfamethoxazole-trimethoprim (BACTRIM/SEPTRA) 400-80 MG tablet  Last Written Prescription Date:  11/15/2018  Last Fill Quantity: 90 tablet,  # refills: 0   Last office visit: 8/23/2018 with prescribing provider:  Cyn Crow MD    Future Office Visit:     90 tablet 0     Sig: Take 1 tablet by mouth daily    Oral Acne/Rosacea Medications Protocol Failed - 2/15/2019  3:04 PM       Failed - Confirmation of diagnosis is required    Please confirm diagnosis is acne or rosacea.     If NOT acne or rosacea; refer request to provider for further evaluation.    If diagnosis IS acne or rosacea, OK to refill BASED ON PREVIOUS REFILL CLINICAL NOTE RECOMMENDATION.         Passed - Patient is 12 years of age or older       Passed - Recent (12 mo) or future (30 days) visit within the authorizing provider's specialty    Patient had office visit in the last 12 months or has a visit in the next 30 days with authorizing provider or within the authorizing provider's specialty.  See \"Patient Info\" tab in inbasket, or \"Choose Columns\" in Meds & Orders section of the refill encounter.             Passed - Medication is active on med list       Passed - No active pregnancy on record       Passed - No positive prenancy test is past 12 months          "

## 2019-02-15 NOTE — TELEPHONE ENCOUNTER
Routing refill request to provider for review/approval because:  Drug not on the [facility] refill protocol     Associated Diagnoses   Kidney replaced by transplant            Veronica Johnson RN Flex

## 2019-02-15 NOTE — TELEPHONE ENCOUNTER
Routing refill request to provider for review/approval because:  I called patient to see which medication she is taking-patient states that amoxicillin is for dental procedures.( it was associated with transplant dx)  Takes Bactrim for Dx of kidney transplant. Out of medication today.    Routing high priority to covering provider.    Danya Castro RN  Message handled by Nurse Triage.

## 2019-02-17 NOTE — TELEPHONE ENCOUNTER
One daily single strength bactrim is what her nephrology team has recommended and prescribed chronically.    Additional refills sent to pharmacy.      Cyn Crow MD  Internal Medicine - Pediatrics

## 2019-02-18 NOTE — PROGRESS NOTES
ANTICOAGULATION FOLLOW-UP CLINIC VISIT    Patient Name:  Elise Mason  Date:  2019  Contact Type:  Face to Face    SUBJECTIVE:     Patient Findings     Positives:   No Problem Findings           OBJECTIVE    INR Protime   Date Value Ref Range Status   2019 2.6 (A) 0.86 - 1.14 Final       ASSESSMENT / PLAN  INR assessment THER    Recheck INR In: 6 WEEKS    INR Location Clinic      Anticoagulation Summary  As of 2019    INR goal:   2.0-3.0   TTR:   76.3 % (3 y)   INR used for dosin.6 (2019)   Warfarin maintenance plan:   5 mg (2.5 mg x 2) every Mon, Wed, Fri; 2.5 mg (2.5 mg x 1) all other days   Full warfarin instructions:   5 mg every Mon, Wed, Fri; 2.5 mg all other days   Weekly warfarin total:   25 mg   No change documented:   Naomie Jeong RN   Plan last modified:   Fallon Velazco RN (2018)   Next INR check:   3/28/2019   Target end date:   Indefinite    Indications    Long term current use of anticoagulant therapy [Z79.01]  History of CVA (cerebrovascular accident) [Z86.73]  Cerebral artery occlusion with cerebral infarction (H) [I63.50]             Anticoagulation Episode Summary     [facility]   [facility]    Preferred lab:       Send INR reminders to:    [facility]    Comments:   2.5mg tabs - dyllan dose // CALENDAR // RF 19      Anticoagulation Care Providers     Provider Role Specialty Phone number    Cyn Crow MD  Internal Medicine [phone]            See the Encounter Report to view Anticoagulation Flowsheet and Dosing Calendar (Go to Encounters tab in chart review, and find the Anticoagulation Therapy Visit)    Patient INR is therapeutic.  Patient will continue to take 25 mg weekly dosing and follow up in 6 weeks or sooner for any problems or concerns.        Naomie España RN

## 2019-03-28 NOTE — PROGRESS NOTES
ANTICOAGULATION FOLLOW-UP CLINIC VISIT    Patient Name:  Elise Mason  Date:  3/28/2019  Contact Type:  Face to Face    SUBJECTIVE:     Patient Findings     Positives:   Change in health    Comments:   Stuffy nose x 6 wks.  She was well for a few days during this time,   but then her  got sick and she got it again.    The patient was assessed for   diet, medication,   missed or extra doses,   bruising or bleeding,   with no problem findings.             OBJECTIVE    INR Protime   Date Value Ref Range Status   2019 2.6 (A) 0.86 - 1.14 Final       ASSESSMENT / PLAN  INR assessment THER    Recheck INR In: 6 WEEKS    INR Location Clinic      Anticoagulation Summary  As of 3/28/2019    INR goal:   2.0-3.0   TTR:   77.1 % (3.1 y)   INR used for dosin.6 (3/28/2019)   Warfarin maintenance plan:   5 mg (2.5 mg x 2) every Mon, Wed, Fri; 2.5 mg (2.5 mg x 1) all other days   Full warfarin instructions:   5 mg every Mon, Wed, Fri; 2.5 mg all other days   Weekly warfarin total:   25 mg   No change documented:   Fallon Velazco, RN   Plan last modified:   Fallon Velazco RN (2018)   Next INR check:   2019   Target end date:   Indefinite    Indications    Long term current use of anticoagulant therapy [Z79.01]  History of CVA (cerebrovascular accident) [Z86.73]  Cerebral artery occlusion with cerebral infarction (H) [I63.50]             Anticoagulation Episode Summary     [facility]   [facility]    Preferred lab:       Send INR reminders to:   [facility]    Comments:   2.5mg tabs - dyllan dose // CALENDAR // RF 19      Anticoagulation Care Providers     Provider Role Specialty Phone number    Cyn Crow MD  Internal Medicine [phone]            See the Encounter Report to view Anticoagulation Flowsheet and Dosing Calendar (Go to Encounters tab in chart review, and find the Anticoagulation Therapy Visit)        Fallon Velazco RN

## 2019-04-01 NOTE — TELEPHONE ENCOUNTER
"Requested Prescriptions   Pending Prescriptions Disp Refills     warfarin (COUMADIN) 2.5 MG tablet [Pharmacy Med Name: WARFARIN TABS 2.5MG]  Last Written Prescription Date: 11/06/2018  Last Fill Quantity: 120 tablet,  # refills: 1   Last office visit: 8/23/2018 with prescribing provider:  Cyn Crow MD    Future Office Visit:     120 tablet 1     Sig: TAKE 2 TABLETS ON MONDAY, WEDNESDAY, AND FRIDAY AND TAKE 1 TABLET ON ALL OTHER DAYS OF THE WEEK OR AS INSTRUCTED BY INR CLINIC    Vitamin K Antagonists Failed - 4/1/2019 11:16 AM       Failed - INR is within goal in the past 6 weeks    Confirm INR is within goal in the past 6 weeks.     Recent Labs   Lab Test 03/28/19  1020   INR 2.6*                      Passed - Recent (12 mo) or future (30 days) visit within the authorizing provider's specialty    Patient had office visit in the last 12 months or has a visit in the next 30 days with authorizing provider or within the authorizing provider's specialty.  See \"Patient Info\" tab in inbasket, or \"Choose Columns\" in Meds & Orders section of the refill encounter.             Passed - Medication is active on med list       Passed - Patient is 18 years of age or older       Passed - Patient is not pregnant       Passed - No positive pregnancy on file in past 12 months          "

## 2019-04-04 NOTE — TELEPHONE ENCOUNTER
Refilled per nurse protocol standing orders.  Fallon Velazco RN  [facility]

## 2019-05-07 NOTE — TELEPHONE ENCOUNTER
TC/MA/LPN - Please call patient to update that labs are ordered and to help schedule a non-fasting lab only appointment.     Huddled with Dr. Crow - Entered lab orders per verbal.   - Parathyroid  - Phosphorus  - CBC with platelets  - CMP  - Cyclosporine  - Tacrolimus

## 2019-05-08 NOTE — TELEPHONE ENCOUNTER
The pt is aware and scheduled for her upcoming lab appointment.   Rashida Steve on 5/8/2019 at 3:27 PM

## 2019-05-09 NOTE — PROGRESS NOTES
ANTICOAGULATION FOLLOW-UP CLINIC VISIT    Patient Name:  Elise Mason  Date:  2019  Contact Type:  Face to Face    SUBJECTIVE:  Patient Findings     Comments:   The patient was assessed for diet, medication, and activity level changes, missed or extra doses, bruising or bleeding, with no problem findings.          Clinical Outcomes     Comments:   The patient was assessed for diet, medication, and activity level changes, missed or extra doses, bruising or bleeding, with no problem findings.             OBJECTIVE    INR Protime   Date Value Ref Range Status   2019 2.1 (A) 0.86 - 1.14 Final       ASSESSMENT / PLAN  INR assessment THER    Recheck INR In: 6 WEEKS    INR Location Clinic      Anticoagulation Summary  As of 2019    INR goal:   2.0-3.0   TTR:   77.9 % (3.2 y)   INR used for dosin.1 (2019)   Warfarin maintenance plan:   5 mg (2.5 mg x 2) every Mon, Wed, Fri; 2.5 mg (2.5 mg x 1) all other days   Full warfarin instructions:   5 mg every Mon, Wed, Fri; 2.5 mg all other days   Weekly warfarin total:   25 mg   No change documented:   Naomie Jeong RN   Plan last modified:   Fallon Velazco, RN (2018)   Next INR check:   2019   Target end date:   Indefinite    Indications    Long term current use of anticoagulant therapy [Z79.01]  History of CVA (cerebrovascular accident) [Z86.73]  Cerebral artery occlusion with cerebral infarction (H) [I63.50]             Anticoagulation Episode Summary     INR check location:   Anticoagulation Clinic    Preferred lab:       Send INR reminders to:   [facility]    Comments:   2.5mg tabs - dyllan dose // CALENDAR // RF 19      Anticoagulation Care Providers     Provider Role Specialty Phone number    Cyn Crow MD  Internal Medicine [phone]            See the Encounter Report to view Anticoagulation Flowsheet and Dosing Calendar (Go to Encounters tab in chart review, and find the Anticoagulation Therapy  Visit)    Patient INR is therapeutic.  Patient will continue to take 25 mg weekly dosing and follow up in 6 weeks or sooner for any problems or concerns.        Naomie España RN

## 2019-05-13 NOTE — TELEPHONE ENCOUNTER
Faxed patients lab results to [facility] per Dr. Crow's request.     Allegra Rosales CMA on 4/23/2019 at 9:32 AM

## 2019-06-07 NOTE — TELEPHONE ENCOUNTER
Patient Call: General    Reason for call: Jaimee from Dr. Ventura's office called regarding recommendation on the patients Bactrim.    Call back needed? Yes    Return Call Needed  Same as documented in contacts section  When to return call?: Same day: Route [facility]

## 2019-06-07 NOTE — TELEPHONE ENCOUNTER
Spoke with Jaimee at Dr. Isidro's office.  Patient has not been seen by the transplant team in years, she follows with Dr. Isidro.    I explained that unless patients are having problems with daily Bactrim (leukopenia/hyperkalemia/etc.) we usually keep them on daily dosing.  If there are issues, we do have a lot of patients that we decrease to M/W/F dosing.  However, if there is a reason they want to get them off completely, we recommend checking a T cell subset and if the absoloute CD4 is > 200, she can stop PCP prophylaxis.    Jaimee verbalized understanding and will discuss with Dr. Isidro and decide what is best for the patient.

## 2019-06-20 NOTE — PROGRESS NOTES
ANTICOAGULATION FOLLOW-UP CLINIC VISIT    Patient Name:  Elise Mason  Date:  2019  Contact Type:  Face to Face    SUBJECTIVE:  Patient Findings     Positives:   Change in medications    Comments:   Dr. Ventura (nephrologist) changed   Bactrim to MWF instead of daily.        Clinical Outcomes     Comments:   Dr. Ventura (nephrologist) changed   Bactrim to MWF instead of daily.           OBJECTIVE    INR Protime   Date Value Ref Range Status   2019 2.2 (A) 0.86 - 1.14 Final       ASSESSMENT / PLAN  INR assessment THER    Recheck INR In: 6 WEEKS    INR Location Clinic      Anticoagulation Summary  As of 2019    INR goal:   2.0-3.0   TTR:   78.7 % (3.3 y)   INR used for dosin.2 (2019)   Warfarin maintenance plan:   5 mg (2.5 mg x 2) every Mon, Wed, Fri; 2.5 mg (2.5 mg x 1) all other days   Full warfarin instructions:   5 mg every Mon, Wed, Fri; 2.5 mg all other days   Weekly warfarin total:   25 mg   No change documented:   Fallon Velazco RN   Plan last modified:   Fallon Velazco RN (2018)   Next INR check:   2019   Target end date:   Indefinite    Indications    Long term current use of anticoagulant therapy [Z79.01]  History of CVA (cerebrovascular accident) [Z86.73]  Cerebral artery occlusion with cerebral infarction (H) [I63.50]             Anticoagulation Episode Summary     INR check location:   Anticoagulation Clinic    Preferred lab:       Send INR reminders to:   [facility]    Comments:   2.5mg tabs - dyllan dose // CALENDAR // RF 19      Anticoagulation Care Providers     Provider Role Specialty Phone number    Cyn Crow MD  Internal Medicine [phone]            See the Encounter Report to view Anticoagulation Flowsheet and Dosing Calendar (Go to Encounters tab in chart review, and find the Anticoagulation Therapy Visit)    INR is therapeutic today. Patient will continue same maintenance dose.   Follow up in 6 weeks or sooner if  needed.        Fallon Velazco RN

## 2019-07-03 NOTE — PROGRESS NOTES
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    Today Elise reports that she has done very well over the last year.  She has not experienced any further episodes of syncope.  She lives in a [facility] home and takes one meal a day at the trauma which takes her about 15 minutes to walk 2.  Uses a walker because of unsteadiness but has not had any problems with dyspnea or easy fatigability.  She does not walk up or down stairs because of her unsteadiness but believes that she would not have a problem walking up a flight of steps.  She leaves well at night without symptoms of PND or orthopnea.  She denies any exertional chest, arm, neck, or jaw discomfort as well as symptoms of syncope/near syncope, strokelike symptoms, orthostasis, palpitations, or claudication.  She has chronic peripheral edema which she states is unchanged in recent years.  She uses compression stockings daily.    In April 2018 a Holter monitor showed some brief episodes of ventricular tachycardia, the longest being 4 beats in length.  A nuclear stress test was done to evaluate for coronary disease and a small basal inferolateral defect was seen with mild simon-infarction ischemia.  There is no history of documented coronary artery disease and her ECG does not suggest previous MI.  It was elected to take a conservative approach since the patient had no symptoms of angina and because of her complicated renal status.  She once again confirms that she has not had any symptoms consistent with angina despite remaining reasonably physically active.      ASSESSMENT/PLAN:    1.  History of syncope.  No further episodes of near syncope although she does  describe some very brief episodes of mild dizziness not associated with palpitations.  These are transient lasting only a second she feels that taking a deep breath resolves these episodes of dizziness when they occur.  2.  Coronary artery disease.  This may be a false positive study or she may have a small area of ischemia but she is completely asymptomatic.  I once again reminded her to watch for symptoms of ischemia and described what these might be.  She develops angina we should proceed with coronary angiography but I believe a conservative approach is still the best approach at this point.  3.  Peripheral edema.  This is been a chronic problem for many years and is stable.  Weight is unchanged from ago.  4.  Hypertension.  Very well controlled on current medications which include diltiazem, felodipine, clonidine and lasix.    Thank you for inviting me to participate in your patient's care.  Please do not hesitate to call if I can be of further assistance.    Orders Placed This Encounter   Procedures     Follow-Up with Cardiologist     No orders of the defined types were placed in this encounter.    There are no discontinued medications.    10 year ASCVD risk: The ASCVD Risk score (Greenock DC Jr., et al., 2013) failed to calculate for the following reasons:    The patient has a prior MI or stroke diagnosis    Encounter Diagnoses   Name Primary?     Coronary artery disease of native artery of native heart with stable angina pectoris (H)      Syncope, unspecified syncope type        CURRENT MEDICATIONS:  Current Outpatient Medications   Medication Sig Dispense Refill     ACE NOT PRESCRIBED, INTENTIONAL, by Other route continuous prn.  0     amoxicillin (AMOXIL) 500 MG capsule TAKE 1 CAPSULE DAILY AS NEEDED (Patient taking differently: TAKE 1 CAPSULE DAILY AS NEEDED BEFORE DENTAL PROCEDURES) 4 capsule 1     ARB NOT PRESCRIBED, INTENTIONAL, by Other route continuous prn.  0     ASPIRIN NOT PRESCRIBED, INTENTIONAL, 1  each continuous prn for other Antiplatelet medication not prescribed intentionally due to Current anticoagulant therapy (warfarin/enoxaparin) 0 each 0     AZATHIOPRINE 50 MG OR TABS Take three tablets by mouth daily 90 11     betamethasone dipropionate (DIPROSONE) 0.05 % cream Apply sparingly to affected area twice daily as needed.  Do not apply to face. 45 g 4     calcitRIOL (ROCALTROL) 0.5 MCG capsule TAKE 1 CAPSULE DAILY 90 capsule 3     cloNIDine (CATAPRES) 0.2 MG tablet Take 1 tablet (0.2 mg) by mouth 2 times daily 180 tablet 3     cycloSPORINE modified (GENGRAF) 25 MG capsule Take 3 capsules by mouth 2 times daily. 540 capsule 11     diltiazem (TIAZAC) 240 MG 24 hr ER beaded capsule Take 1 capsule (240 mg) by mouth daily 90 capsule 3     felodipine ER (PLENDIL) 2.5 MG 24 hr tablet TAKE 1 TABLET DAILY 90 tablet 0     ferrous sulfate 325 (65 FE) MG tablet Take 1 tablet by mouth daily. 90 tablet 1     folic acid (FOLVITE) 1 MG tablet TAKE 2 TABLETS (2,000 MCG) BY MOUTH DAILY 180 tablet 0     furosemide (LASIX) 80 MG tablet Take 1 tablet (80 mg) by mouth 2 times daily 180 tablet 3     GLUCOSAMINE SULFATE PO Take 750 mLs by mouth daily       nitroGLYcerin (NITROSTAT) 0.4 MG sublingual tablet For chest pain place 1 tablet under the tongue every 5 minutes for 3 doses. If symptoms persist 5 minutes after 1st dose call 911. 25 tablet 1     OMEGA 3 550 MG OR CAPS Take 2,000 mg by mouth 2 times daily.       ORDER FOR DME Please dispense 2 pair of knee high compression 20-30 stockings 2 each 0     PROAIR  (90 Base) MCG/ACT inhaler USE 2 INHALATIONS EVERY 4 HOURS AS NEEDED FOR SHORTNESS OF BREATH, DYSPNEA OR WHEEZING 8.5 g 3     simvastatin (ZOCOR) 20 MG tablet Take 1 tablet (20 mg) by mouth At Bedtime 90 tablet 3     sulfamethoxazole-trimethoprim (BACTRIM/SEPTRA) 400-80 MG tablet Take 1 tablet by mouth daily (Patient taking differently: Take 1 tablet by mouth Every Mon, Wed, Fri Morning ) 90 tablet 3     TUMS OR  Take 2 tablets by mouth 3 times daily.       VITAMIN B-12 500 MCG OR TABS OTC    1 tab daily per transplant clinic       VITAMIN B-6 100 MG OR TABS OTC   2 tabs daily per transplant clinic       warfarin (COUMADIN) 2.5 MG tablet TAKE 2 TABLETS ON MONDAY, WEDNESDAY, AND FRIDAY AND TAKE 1 TABLET ON ALL OTHER DAYS OF THE WEEK OR AS INSTRUCTED BY INR CLINIC 130 tablet 1       ALLERGIES     Allergies   Allergen Reactions     No Known Allergies        PAST MEDICAL HISTORY:  Past Medical History:   Diagnosis Date     Anemia, unspecified      Cervical cancer (H) 1972    Hysterectomy, still has ovaries     Compression fracture of lumbar vertebra (H) July 1996    L1 and L4? Happened while walking down stairs. Didn't fall.     CVA (cerebral infarction) Nov 1995 & Nov 1996    on coumadin     Esophageal reflux      Long-term (current) use of anticoagulants      Nonspecific abnormal unspecified cardiovascular function study      Osteoporosis, unspecified      Other specified disorder resulting from impaired renal function      Polycystic kidney, unspecified type      Unspecified essential hypertension        PAST SURGICAL HISTORY:  Past Surgical History:   Procedure Laterality Date     BIOPSY BREAST  1976 or 1977    left breast? No lump, bleeding from nipple     HYSTERECTOMY, PAP NO LONGER INDICATED  1972 or 1973    Hysterectomy due to cervical cancer     JOINT REPLACEMENT, HIP RT/LT      Left Hip Replacement     TRANSPLANT KIDNEY RECIPIENT LIVING RELATED  9/27/1995    Right side, both original kidneys left in place       FAMILY HISTORY:  Family History   Problem Relation Age of Onset     Arthritis Sister         rheumatoid     Asthma Sister      Cancer Sister 53        Non-Hodgkins lymphoma     Kidney Disease Sister         Polycystic kidney disease. Transplant age 50     Kidney Disease Sister         Polycystic kidney disease. Transplant age 51     Myocardial Infarction Mother 53        2nd MI age 56     Cerebrovascular Disease  Mother 53     Respiratory Father         Emphysema     Myocardial Infarction Maternal Grandmother      Hypertension Maternal Grandfather      Kidney Disease Maternal Grandfather         polycystic kidney disease     Anxiety Disorder Brother      Kidney Disease Daughter         Polycystic kidney disease       SOCIAL HISTORY:  Social History     Socioeconomic History     Marital status:      Spouse name: None     Number of children: 2     Years of education: None     Highest education level: None   Occupational History     Occupation: disability     Comment: was  for [employer]     Employer: RETIRED   Social Needs     Financial resource strain: None     Food insecurity:     Worry: None     Inability: None     Transportation needs:     Medical: None     Non-medical: None   Tobacco Use     Smoking status: Former Smoker     Last attempt to quit: 1990     Years since quittin.5     Smokeless tobacco: Never Used   Substance and Sexual Activity     Alcohol use: Yes     Comment: 2-3x per year     Drug use: No     Sexual activity: Yes     Partners: Male   Lifestyle     Physical activity:     Days per week: None     Minutes per session: None     Stress: None   Relationships     Social connections:     Talks on phone: None     Gets together: None     Attends [employer] service: None     Active member of club or organization: None     Attends meetings of clubs or organizations: None     Relationship status: None     Intimate partner violence:     Fear of current or ex partner: None     Emotionally abused: None     Physically abused: None     Forced sexual activity: None   Other Topics Concern     Parent/sibling w/ CABG, MI or angioplasty before 65F 55M? Yes   Social History Narrative     None       Review of Systems:  Skin:  Positive for     Eyes:  Positive for glasses;cataracts  ENT:  Negative    Respiratory:  Positive for shortness of breath;cough  Cardiovascular:    syncope or near-syncope;Positive  "for;lightheadedness;dizziness  Gastroenterology: Negative    Genitourinary:  Negative    Musculoskeletal:  Positive for back pain;joint pain;arthritis  Neurologic:  Positive for stroke;numbness or tingling of feet  Psychiatric:  Negative    Heme/Lymph/Imm:  Negative    Endocrine:  Negative      Physical Exam:  Vitals: /62 (BP Location: Right arm, Patient Position: Chair, Cuff Size: Adult Regular)   Pulse 72   Ht 1.626 m (5' 4\")   Wt 110.2 kg (243 lb)   SpO2 97%   BMI 41.71 kg/m      Constitutional:  cooperative, alert and oriented, well developed, well nourished, in no acute distress morbidly obese      Skin:  warm and dry to the touch        Head:  normocephalic        Eyes:  no xanthalasma        ENT:  no pallor or cyanosis        Neck:  carotid pulses are full and equal bilaterally, JVP normal, no carotid bruit        Chest:  normal breath sounds, clear to auscultation, normal A-P diameter, normal symmetry, normal respiratory excursion, no use of accessory muscles        Cardiac: regular rhythm;normal S1 and S2;no S3 or S4;apical impulse not displaced       systolic murmur;grade 1;RUSB          Abdomen:  abdomen soft;BS normoactive        Vascular:                                   Patient is wearing thick compression stockings, not removed, pulses not palpable through these.  Radial pulses normal bilaterally.    Extremities and Back:           Neurological:  no gross motor deficits          Recent Lab Results:  LIPID RESULTS:  Lab Results   Component Value Date    CHOL 144 09/05/2018    HDL 82 09/05/2018    LDL 33 09/05/2018    TRIG 144 09/05/2018    CHOLHDLRATIO 2.0 02/06/2015       LIVER ENZYME RESULTS:  Lab Results   Component Value Date    AST 23 05/09/2019    ALT 19 05/09/2019       CBC RESULTS:  Lab Results   Component Value Date    WBC 8.3 05/09/2019    RBC 4.12 05/09/2019    HGB 12.8 05/09/2019    HCT 39.0 05/09/2019    MCV 95 05/09/2019    MCH 31.1 05/09/2019    MCHC 32.8 05/09/2019    RDW " 15.3 (H) 05/09/2019     05/09/2019       BMP RESULTS:  Lab Results   Component Value Date     05/09/2019    POTASSIUM 3.8 05/09/2019    CHLORIDE 106 05/09/2019    CO2 30 05/09/2019    ANIONGAP 6 05/09/2019     (H) 05/09/2019    BUN 38 (H) 05/09/2019    CR 2.14 (H) 05/09/2019    GFRESTIMATED 22 (L) 05/09/2019    GFRESTBLACK 26 (L) 05/09/2019    KRISTINA 10.5 (H) 05/09/2019        A1C RESULTS:  Lab Results   Component Value Date    A1C 5.7 (H) 09/05/2018       INR RESULTS:  Lab Results   Component Value Date    INR 2.2 (A) 06/20/2019    INR 2.1 (A) 05/09/2019    INR 3.11 (H) 03/03/2018    INR 8.29 (HH) 03/02/2018         Salomón Minaya MD, FACC    CC  Salomón Minaya MD  [address] [place] [id number]

## 2019-07-03 NOTE — LETTER
7/3/2019    Cyn Crow MD  [address] [place] [address]    RE: Elise Mason       Dear Colleague,    I had the pleasure of seeing Elise Mason in the [facility].    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    Today Elise reports that she has done very well over the last year.  She has not experienced any further episodes of syncope.  She lives in a jail home and [facility] her about 15 minutes to walk 2.  Uses a walker because of unsteadiness but has not had any problems with dyspnea or easy fatigability.  She does not walk up or down stairs because of her unsteadiness but believes that she would not have a problem walking up a flight of steps.  She leaves well at night without symptoms of PND or orthopnea.  She denies any exertional chest, arm, neck, or jaw discomfort as well as symptoms of syncope/near syncope, strokelike symptoms, orthostasis, palpitations, or claudication.  She has chronic peripheral edema which she states is unchanged in recent years.  She uses compression stockings daily.    In April 2018 a Holter monitor showed some brief episodes of ventricular tachycardia, the longest being 4 beats in length.  A nuclear stress test was done to evaluate for coronary disease and a small basal inferolateral defect was seen with mild simon-infarction ischemia.  There is no history of documented coronary artery disease and her ECG does not suggest previous MI.  It was elected to take a conservative approach since the patient had no symptoms of angina and because of her complicated renal status.   She once again confirms that she has not had any symptoms consistent with angina despite remaining reasonably physically active.      ASSESSMENT/PLAN:    1.  History of syncope.  No further episodes of near syncope although she does describe some very brief episodes of mild dizziness not associated with palpitations.  These are transient lasting only a second she feels that taking a deep breath resolves these episodes of dizziness when they occur.  2.  Coronary artery disease.  This may be a false positive study or she may have a small area of ischemia but she is completely asymptomatic.  I once again reminded her to watch for symptoms of ischemia and described what these might be.  She develops angina we should proceed with coronary angiography but I believe a conservative approach is still the best approach at this point.  3.  Peripheral edema.  This is been a chronic problem for many years and is stable.  Weight is unchanged from ago.  4.  Hypertension.  Very well controlled on current medications which include diltiazem, felodipine, clonidine and lasix.    Thank you for inviting me to participate in your patient's care.  Please do not hesitate to call if I can be of further assistance.    Orders Placed This Encounter   Procedures     Follow-Up with Cardiologist     No orders of the defined types were placed in this encounter.    There are no discontinued medications.    10 year ASCVD risk: The ASCVD Risk score (Vishal DC Jr., et al., 2013) failed to calculate for the following reasons:    The patient has a prior MI or stroke diagnosis    Encounter Diagnoses   Name Primary?     Coronary artery disease of native artery of native heart with stable angina pectoris (H)      Syncope, unspecified syncope type        CURRENT MEDICATIONS:  Current Outpatient Medications   Medication Sig Dispense Refill     ACE NOT PRESCRIBED, INTENTIONAL, by Other route continuous prn.  0     amoxicillin (AMOXIL) 500 MG capsule TAKE 1  CAPSULE DAILY AS NEEDED (Patient taking differently: TAKE 1 CAPSULE DAILY AS NEEDED BEFORE DENTAL PROCEDURES) 4 capsule 1     ARB NOT PRESCRIBED, INTENTIONAL, by Other route continuous prn.  0     ASPIRIN NOT PRESCRIBED, INTENTIONAL, 1 each continuous prn for other Antiplatelet medication not prescribed intentionally due to Current anticoagulant therapy (warfarin/enoxaparin) 0 each 0     AZATHIOPRINE 50 MG OR TABS Take three tablets by mouth daily 90 11     betamethasone dipropionate (DIPROSONE) 0.05 % cream Apply sparingly to affected area twice daily as needed.  Do not apply to face. 45 g 4     calcitRIOL (ROCALTROL) 0.5 MCG capsule TAKE 1 CAPSULE DAILY 90 capsule 3     cloNIDine (CATAPRES) 0.2 MG tablet Take 1 tablet (0.2 mg) by mouth 2 times daily 180 tablet 3     cycloSPORINE modified (GENGRAF) 25 MG capsule Take 3 capsules by mouth 2 times daily. 540 capsule 11     diltiazem (TIAZAC) 240 MG 24 hr ER beaded capsule Take 1 capsule (240 mg) by mouth daily 90 capsule 3     felodipine ER (PLENDIL) 2.5 MG 24 hr tablet TAKE 1 TABLET DAILY 90 tablet 0     ferrous sulfate 325 (65 FE) MG tablet Take 1 tablet by mouth daily. 90 tablet 1     folic acid (FOLVITE) 1 MG tablet TAKE 2 TABLETS (2,000 MCG) BY MOUTH DAILY 180 tablet 0     furosemide (LASIX) 80 MG tablet Take 1 tablet (80 mg) by mouth 2 times daily 180 tablet 3     GLUCOSAMINE SULFATE PO Take 750 mLs by mouth daily       nitroGLYcerin (NITROSTAT) 0.4 MG sublingual tablet For chest pain place 1 tablet under the tongue every 5 minutes for 3 doses. If symptoms persist 5 minutes after 1st dose call 911. 25 tablet 1     OMEGA 3 550 MG OR CAPS Take 2,000 mg by mouth 2 times daily.       ORDER FOR DME Please dispense 2 pair of knee high compression 20-30 stockings 2 each 0     PROAIR  (90 Base) MCG/ACT inhaler USE 2 INHALATIONS EVERY 4 HOURS AS NEEDED FOR SHORTNESS OF BREATH, DYSPNEA OR WHEEZING 8.5 g 3     simvastatin (ZOCOR) 20 MG tablet Take 1 tablet (20 mg)  by mouth At Bedtime 90 tablet 3     sulfamethoxazole-trimethoprim (BACTRIM/SEPTRA) 400-80 MG tablet Take 1 tablet by mouth daily (Patient taking differently: Take 1 tablet by mouth Every Mon, Wed, Fri Morning ) 90 tablet 3     TUMS OR Take 2 tablets by mouth 3 times daily.       VITAMIN B-12 500 MCG OR TABS OTC    1 tab daily per transplant clinic       VITAMIN B-6 100 MG OR TABS OTC   2 tabs daily per transplant clinic       warfarin (COUMADIN) 2.5 MG tablet TAKE 2 TABLETS ON MONDAY, WEDNESDAY, AND FRIDAY AND TAKE 1 TABLET ON ALL OTHER DAYS OF THE WEEK OR AS INSTRUCTED BY INR CLINIC 130 tablet 1       ALLERGIES     Allergies   Allergen Reactions     No Known Allergies        PAST MEDICAL HISTORY:  Past Medical History:   Diagnosis Date     Anemia, unspecified      Cervical cancer (H) 1972    Hysterectomy, still has ovaries     Compression fracture of lumbar vertebra (H) July 1996    L1 and L4? Happened while walking down stairs. Didn't fall.     CVA (cerebral infarction) Nov 1995 & Nov 1996    on coumadin     Esophageal reflux      Long-term (current) use of anticoagulants      Nonspecific abnormal unspecified cardiovascular function study      Osteoporosis, unspecified      Other specified disorder resulting from impaired renal function      Polycystic kidney, unspecified type      Unspecified essential hypertension        PAST SURGICAL HISTORY:  Past Surgical History:   Procedure Laterality Date     BIOPSY BREAST  1976 or 1977    left breast? No lump, bleeding from nipple     HYSTERECTOMY, PAP NO LONGER INDICATED  1972 or 1973    Hysterectomy due to cervical cancer     JOINT REPLACEMENT, HIP RT/LT      Left Hip Replacement     TRANSPLANT KIDNEY RECIPIENT LIVING RELATED  9/27/1995    Right side, both original kidneys left in place       FAMILY HISTORY:  Family History   Problem Relation Age of Onset     Arthritis Sister         rheumatoid     Asthma Sister      Cancer Sister 53        Non-Hodgkins lymphoma      Kidney Disease Sister         Polycystic kidney disease. Transplant age 50     Kidney Disease Sister         Polycystic kidney disease. Transplant age 51     Myocardial Infarction Mother 53        2nd MI age 56     Cerebrovascular Disease Mother 53     Respiratory Father         Emphysema     Myocardial Infarction Maternal Grandmother      Hypertension Maternal Grandfather      Kidney Disease Maternal Grandfather         polycystic kidney disease     Anxiety Disorder Brother      Kidney Disease Daughter         Polycystic kidney disease       SOCIAL HISTORY:  Social History     Socioeconomic History     Marital status:      Spouse name: None     Number of children: 2     Years of education: None     Highest education level: None   Occupational History     Occupation: disability     Comment: was  for [employer]     Employer: RETIRED   Social Needs     Financial resource strain: None     Food insecurity:     Worry: None     Inability: None     Transportation needs:     Medical: None     Non-medical: None   Tobacco Use     Smoking status: Former Smoker     Last attempt to quit: 1990     Years since quittin.5     Smokeless tobacco: Never Used   Substance and Sexual Activity     Alcohol use: Yes     Comment: 2-3x per year     Drug use: No     Sexual activity: Yes     Partners: Male   Lifestyle     Physical activity:     Days per week: None     Minutes per session: None     Stress: None   Relationships     Social connections:     Talks on phone: None     Gets together: None     Attends Restoration service: None     Active member of club or organization: None     Attends meetings of clubs or organizations: None     Relationship status: None     Intimate partner violence:     Fear of current or ex partner: None     Emotionally abused: None     Physically abused: None     Forced sexual activity: None   Other Topics Concern     Parent/sibling w/ CABG, MI or angioplasty before 65F 55M? Yes   Social History  "Narrative     None       Review of Systems:  Skin:  Positive for     Eyes:  Positive for glasses;cataracts  ENT:  Negative    Respiratory:  Positive for shortness of breath;cough  Cardiovascular:    syncope or near-syncope;Positive for;lightheadedness;dizziness  Gastroenterology: Negative    Genitourinary:  Negative    Musculoskeletal:  Positive for back pain;joint pain;arthritis  Neurologic:  Positive for stroke;numbness or tingling of feet  Psychiatric:  Negative    Heme/Lymph/Imm:  Negative    Endocrine:  Negative      Physical Exam:  Vitals: /62 (BP Location: Right arm, Patient Position: Chair, Cuff Size: Adult Regular)   Pulse 72   Ht 1.626 m (5' 4\")   Wt 110.2 kg (243 lb)   SpO2 97%   BMI 41.71 kg/m       Constitutional:  cooperative, alert and oriented, well developed, well nourished, in no acute distress morbidly obese      Skin:  warm and dry to the touch        Head:  normocephalic        Eyes:  no xanthalasma        ENT:  no pallor or cyanosis        Neck:  carotid pulses are full and equal bilaterally, JVP normal, no carotid bruit        Chest:  normal breath sounds, clear to auscultation, normal A-P diameter, normal symmetry, normal respiratory excursion, no use of accessory muscles        Cardiac: regular rhythm;normal S1 and S2;no S3 or S4;apical impulse not displaced       systolic murmur;grade 1;RUSB          Abdomen:  abdomen soft;BS normoactive        Vascular:                                   Patient is wearing thick compression stockings, not removed, pulses not palpable through these.  Radial pulses normal bilaterally.    Extremities and Back:           Neurological:  no gross motor deficits          Recent Lab Results:  LIPID RESULTS:  Lab Results   Component Value Date    CHOL 144 09/05/2018    HDL 82 09/05/2018    LDL 33 09/05/2018    TRIG 144 09/05/2018    CHOLHDLRATIO 2.0 02/06/2015       LIVER ENZYME RESULTS:  Lab Results   Component Value Date    AST 23 05/09/2019    ALT 19 " 05/09/2019       CBC RESULTS:  Lab Results   Component Value Date    WBC 8.3 05/09/2019    RBC 4.12 05/09/2019    HGB 12.8 05/09/2019    HCT 39.0 05/09/2019    MCV 95 05/09/2019    MCH 31.1 05/09/2019    MCHC 32.8 05/09/2019    RDW 15.3 (H) 05/09/2019     05/09/2019       BMP RESULTS:  Lab Results   Component Value Date     05/09/2019    POTASSIUM 3.8 05/09/2019    CHLORIDE 106 05/09/2019    CO2 30 05/09/2019    ANIONGAP 6 05/09/2019     (H) 05/09/2019    BUN 38 (H) 05/09/2019    CR 2.14 (H) 05/09/2019    GFRESTIMATED 22 (L) 05/09/2019    GFRESTBLACK 26 (L) 05/09/2019    KRISTINA 10.5 (H) 05/09/2019        A1C RESULTS:  Lab Results   Component Value Date    A1C 5.7 (H) 09/05/2018       INR RESULTS:  Lab Results   Component Value Date    INR 2.2 (A) 06/20/2019    INR 2.1 (A) 05/09/2019    INR 3.11 (H) 03/03/2018    INR 8.29 (HH) 03/02/2018         Salomón Minaya MD, [facility]    CC  Salomón Minaya MD  [address] [place], [place] [id number]                    Thank you for allowing me to participate in the care of your patient.      Sincerely,     Salomón Minaya MD     [facility]    cc:   Salomón Minaya MD  [address] [place] [id number]

## 2019-07-03 NOTE — LETTER
7/3/2019    Cyn Crow MD  [address] [place] [address]    RE: Elise Mason       Dear Colleague,    I had the pleasure of seeing Elise Mason in the [facility].    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    Today Elise reports that she has done very well over the last year.  She has not experienced any further episodes of syncope.  She lives in a long-term home and takes one meal a day at the [facility] which takes her about 15 minutes to walk 2.  Uses a walker because of unsteadiness but has not had any problems with dyspnea or easy fatigability.  She does not walk up or down stairs because of her unsteadiness but believes that she would not have a problem walking up a flight of steps.  She leaves well at night without symptoms of PND or orthopnea.  She denies any exertional chest, arm, neck, or jaw discomfort as well as symptoms of syncope/near syncope, strokelike symptoms, orthostasis, palpitations, or claudication.  She has chronic peripheral edema which she states is unchanged in recent years.  She uses compression stockings daily.    In April 2018 a Holter monitor showed some brief episodes of ventricular tachycardia, the longest being 4 beats in length.  A nuclear stress test was done to evaluate for coronary disease and a small basal inferolateral defect was seen with mild simon-infarction ischemia.  There is no history of documented coronary artery disease and her ECG does not suggest previous MI.  It was elected to take a conservative approach since the patient had no symptoms of angina and because of her complicated renal status.   She once again confirms that she has not had any symptoms consistent with angina despite remaining reasonably physically active.      ASSESSMENT/PLAN:    1.  History of syncope.  No further episodes of near syncope although she does describe some very brief episodes of mild dizziness not associated with palpitations.  These are transient lasting only a second she feels that taking a deep breath resolves these episodes of dizziness when they occur.  2.  Coronary artery disease.  This may be a false positive study or she may have a small area of ischemia but she is completely asymptomatic.  I once again reminded her to watch for symptoms of ischemia and described what these might be.  She develops angina we should proceed with coronary angiography but I believe a conservative approach is still the best approach at this point.  3.  Peripheral edema.  This is been a chronic problem for many years and is stable.  Weight is unchanged from ago.  4.  Hypertension.  Very well controlled on current medications which include diltiazem, felodipine, clonidine and lasix.    Thank you for inviting me to participate in your patient's care.  Please do not hesitate to call if I can be of further assistance.    Orders Placed This Encounter   Procedures     Follow-Up with Cardiologist     No orders of the defined types were placed in this encounter.    There are no discontinued medications.    10 year ASCVD risk: The ASCVD Risk score (Vishal DC Jr., et al., 2013) failed to calculate for the following reasons:    The patient has a prior MI or stroke diagnosis    Encounter Diagnoses   Name Primary?     Coronary artery disease of native artery of native heart with stable angina pectoris (H)      Syncope, unspecified syncope type        CURRENT MEDICATIONS:  Current Outpatient Medications   Medication Sig Dispense Refill     ACE NOT PRESCRIBED, INTENTIONAL, by Other route continuous prn.  0     amoxicillin (AMOXIL) 500 MG capsule TAKE 1  CAPSULE DAILY AS NEEDED (Patient taking differently: TAKE 1 CAPSULE DAILY AS NEEDED BEFORE DENTAL PROCEDURES) 4 capsule 1     ARB NOT PRESCRIBED, INTENTIONAL, by Other route continuous prn.  0     ASPIRIN NOT PRESCRIBED, INTENTIONAL, 1 each continuous prn for other Antiplatelet medication not prescribed intentionally due to Current anticoagulant therapy (warfarin/enoxaparin) 0 each 0     AZATHIOPRINE 50 MG OR TABS Take three tablets by mouth daily 90 11     betamethasone dipropionate (DIPROSONE) 0.05 % cream Apply sparingly to affected area twice daily as needed.  Do not apply to face. 45 g 4     calcitRIOL (ROCALTROL) 0.5 MCG capsule TAKE 1 CAPSULE DAILY 90 capsule 3     cloNIDine (CATAPRES) 0.2 MG tablet Take 1 tablet (0.2 mg) by mouth 2 times daily 180 tablet 3     cycloSPORINE modified (GENGRAF) 25 MG capsule Take 3 capsules by mouth 2 times daily. 540 capsule 11     diltiazem (TIAZAC) 240 MG 24 hr ER beaded capsule Take 1 capsule (240 mg) by mouth daily 90 capsule 3     felodipine ER (PLENDIL) 2.5 MG 24 hr tablet TAKE 1 TABLET DAILY 90 tablet 0     ferrous sulfate 325 (65 FE) MG tablet Take 1 tablet by mouth daily. 90 tablet 1     folic acid (FOLVITE) 1 MG tablet TAKE 2 TABLETS (2,000 MCG) BY MOUTH DAILY 180 tablet 0     furosemide (LASIX) 80 MG tablet Take 1 tablet (80 mg) by mouth 2 times daily 180 tablet 3     GLUCOSAMINE SULFATE PO Take 750 mLs by mouth daily       nitroGLYcerin (NITROSTAT) 0.4 MG sublingual tablet For chest pain place 1 tablet under the tongue every 5 minutes for 3 doses. If symptoms persist 5 minutes after 1st dose call 911. 25 tablet 1     OMEGA 3 550 MG OR CAPS Take 2,000 mg by mouth 2 times daily.       ORDER FOR DME Please dispense 2 pair of knee high compression 20-30 stockings 2 each 0     PROAIR  (90 Base) MCG/ACT inhaler USE 2 INHALATIONS EVERY 4 HOURS AS NEEDED FOR SHORTNESS OF BREATH, DYSPNEA OR WHEEZING 8.5 g 3     simvastatin (ZOCOR) 20 MG tablet Take 1 tablet (20 mg)  by mouth At Bedtime 90 tablet 3     sulfamethoxazole-trimethoprim (BACTRIM/SEPTRA) 400-80 MG tablet Take 1 tablet by mouth daily (Patient taking differently: Take 1 tablet by mouth Every Mon, Wed, Fri Morning ) 90 tablet 3     TUMS OR Take 2 tablets by mouth 3 times daily.       VITAMIN B-12 500 MCG OR TABS OTC    1 tab daily per transplant clinic       VITAMIN B-6 100 MG OR TABS OTC   2 tabs daily per transplant clinic       warfarin (COUMADIN) 2.5 MG tablet TAKE 2 TABLETS ON MONDAY, WEDNESDAY, AND FRIDAY AND TAKE 1 TABLET ON ALL OTHER DAYS OF THE WEEK OR AS INSTRUCTED BY INR CLINIC 130 tablet 1       ALLERGIES     Allergies   Allergen Reactions     No Known Allergies        PAST MEDICAL HISTORY:  Past Medical History:   Diagnosis Date     Anemia, unspecified      Cervical cancer (H) 1972    Hysterectomy, still has ovaries     Compression fracture of lumbar vertebra (H) July 1996    L1 and L4? Happened while walking down stairs. Didn't fall.     CVA (cerebral infarction) Nov 1995 & Nov 1996    on coumadin     Esophageal reflux      Long-term (current) use of anticoagulants      Nonspecific abnormal unspecified cardiovascular function study      Osteoporosis, unspecified      Other specified disorder resulting from impaired renal function      Polycystic kidney, unspecified type      Unspecified essential hypertension        PAST SURGICAL HISTORY:  Past Surgical History:   Procedure Laterality Date     BIOPSY BREAST  1976 or 1977    left breast? No lump, bleeding from nipple     HYSTERECTOMY, PAP NO LONGER INDICATED  1972 or 1973    Hysterectomy due to cervical cancer     JOINT REPLACEMENT, HIP RT/LT      Left Hip Replacement     TRANSPLANT KIDNEY RECIPIENT LIVING RELATED  9/27/1995    Right side, both original kidneys left in place       FAMILY HISTORY:  Family History   Problem Relation Age of Onset     Arthritis Sister         rheumatoid     Asthma Sister      Cancer Sister 53        Non-Hodgkins lymphoma      Kidney Disease Sister         Polycystic kidney disease. Transplant age 50     Kidney Disease Sister         Polycystic kidney disease. Transplant age 51     Myocardial Infarction Mother 53        2nd MI age 56     Cerebrovascular Disease Mother 53     Respiratory Father         Emphysema     Myocardial Infarction Maternal Grandmother      Hypertension Maternal Grandfather      Kidney Disease Maternal Grandfather         polycystic kidney disease     Anxiety Disorder Brother      Kidney Disease Daughter         Polycystic kidney disease       SOCIAL HISTORY:  Social History     Socioeconomic History     Marital status:      Spouse name: None     Number of children: 2     Years of education: None     Highest education level: None   Occupational History     Occupation: disability     Comment: was  for [employer]     Employer: RETIRED   Social Needs     Financial resource strain: None     Food insecurity:     Worry: None     Inability: None     Transportation needs:     Medical: None     Non-medical: None   Tobacco Use     Smoking status: Former Smoker     Last attempt to quit: 1990     Years since quittin.5     Smokeless tobacco: Never Used   Substance and Sexual Activity     Alcohol use: Yes     Comment: 2-3x per year     Drug use: No     Sexual activity: Yes     Partners: Male   Lifestyle     Physical activity:     Days per week: None     Minutes per session: None     Stress: None   Relationships     Social connections:     Talks on phone: None     Gets together: None     Attends [employer] service: None     Active member of club or organization: None     Attends meetings of clubs or organizations: None     Relationship status: None     Intimate partner violence:     Fear of current or ex partner: None     Emotionally abused: None     Physically abused: None     Forced sexual activity: None   Other Topics Concern     Parent/sibling w/ CABG, MI or angioplasty before 65F 55M? Yes   Social History  "Narrative     None       Review of Systems:  Skin:  Positive for     Eyes:  Positive for glasses;cataracts  ENT:  Negative    Respiratory:  Positive for shortness of breath;cough  Cardiovascular:    syncope or near-syncope;Positive for;lightheadedness;dizziness  Gastroenterology: Negative    Genitourinary:  Negative    Musculoskeletal:  Positive for back pain;joint pain;arthritis  Neurologic:  Positive for stroke;numbness or tingling of feet  Psychiatric:  Negative    Heme/Lymph/Imm:  Negative    Endocrine:  Negative      Physical Exam:  Vitals: /62 (BP Location: Right arm, Patient Position: Chair, Cuff Size: Adult Regular)   Pulse 72   Ht 1.626 m (5' 4\")   Wt 110.2 kg (243 lb)   SpO2 97%   BMI 41.71 kg/m       Constitutional:  cooperative, alert and oriented, well developed, well nourished, in no acute distress morbidly obese      Skin:  warm and dry to the touch        Head:  normocephalic        Eyes:  no xanthalasma        ENT:  no pallor or cyanosis        Neck:  carotid pulses are full and equal bilaterally, JVP normal, no carotid bruit        Chest:  normal breath sounds, clear to auscultation, normal A-P diameter, normal symmetry, normal respiratory excursion, no use of accessory muscles        Cardiac: regular rhythm;normal S1 and S2;no S3 or S4;apical impulse not displaced       systolic murmur;grade 1;RUSB          Abdomen:  abdomen soft;BS normoactive        Vascular:                                   Patient is wearing thick compression stockings, not removed, pulses not palpable through these.  Radial pulses normal bilaterally.    Extremities and Back:           Neurological:  no gross motor deficits          Recent Lab Results:  LIPID RESULTS:  Lab Results   Component Value Date    CHOL 144 09/05/2018    HDL 82 09/05/2018    LDL 33 09/05/2018    TRIG 144 09/05/2018    CHOLHDLRATIO 2.0 02/06/2015       LIVER ENZYME RESULTS:  Lab Results   Component Value Date    AST 23 05/09/2019    ALT 19 " 05/09/2019       CBC RESULTS:  Lab Results   Component Value Date    WBC 8.3 05/09/2019    RBC 4.12 05/09/2019    HGB 12.8 05/09/2019    HCT 39.0 05/09/2019    MCV 95 05/09/2019    MCH 31.1 05/09/2019    MCHC 32.8 05/09/2019    RDW 15.3 (H) 05/09/2019     05/09/2019       BMP RESULTS:  Lab Results   Component Value Date     05/09/2019    POTASSIUM 3.8 05/09/2019    CHLORIDE 106 05/09/2019    CO2 30 05/09/2019    ANIONGAP 6 05/09/2019     (H) 05/09/2019    BUN 38 (H) 05/09/2019    CR 2.14 (H) 05/09/2019    GFRESTIMATED 22 (L) 05/09/2019    GFRESTBLACK 26 (L) 05/09/2019    KRISTINA 10.5 (H) 05/09/2019      A1C RESULTS:  Lab Results   Component Value Date    A1C 5.7 (H) 09/05/2018       INR RESULTS:  Lab Results   Component Value Date    INR 2.2 (A) 06/20/2019    INR 2.1 (A) 05/09/2019    INR 3.11 (H) 03/03/2018    INR 8.29 (HH) 03/02/2018     Thank you for allowing me to participate in the care of your patient.    Sincerely,     Salomón Minaya MD     [facility]

## 2019-08-01 NOTE — PROGRESS NOTES
ANTICOAGULATION FOLLOW-UP CLINIC VISIT    Patient Name:  Elise Mason  Date:  2019  Contact Type:  Face to Face    SUBJECTIVE:  Patient Findings     Comments:   The patient was assessed for   diet, medication,   missed or extra doses,   bruising or bleeding,   with no problem findings.          Clinical Outcomes     Comments:   The patient was assessed for   diet, medication,   missed or extra doses,   bruising or bleeding,   with no problem findings.             OBJECTIVE    INR Protime   Date Value Ref Range Status   2019 2.3 (A) 0.86 - 1.14 Final       ASSESSMENT / PLAN  INR assessment THER    Recheck INR In: 6 WEEKS    INR Location Clinic      Anticoagulation Summary  As of 2019    INR goal:   2.0-3.0   TTR:   79.4 % (3.4 y)   INR used for dosin.3 (2019)   Warfarin maintenance plan:   5 mg (2.5 mg x 2) every Mon, Wed, Fri; 2.5 mg (2.5 mg x 1) all other days   Full warfarin instructions:   5 mg every Mon, Wed, Fri; 2.5 mg all other days   Weekly warfarin total:   25 mg   No change documented:   Fallon Velazco RN   Plan last modified:   Fallon Velazco RN (2018)   Next INR check:   2019   Target end date:   Indefinite    Indications    Long term current use of anticoagulant therapy [Z79.01]  History of CVA (cerebrovascular accident) [Z86.73]  Cerebral artery occlusion with cerebral infarction (H) [I63.50]             Anticoagulation Episode Summary     INR check location:   Anticoagulation Clinic    Preferred lab:       Send INR reminders to:   DEONTE GONZALEZ    Comments:   2.5mg tabs - dyllan dose // CALENDAR       Anticoagulation Care Providers     Provider Role Specialty Phone number    Cyn Crow MD  Internal Medicine [phone]            See the Encounter Report to view Anticoagulation Flowsheet and Dosing Calendar (Go to Encounters tab in chart review, and find the Anticoagulation Therapy Visit)    INR is therapeutic today. Patient will continue same  maintenance dose.   Follow up in 6 weeks or sooner if needed.        Fallon Velazco RN

## 2019-08-05 NOTE — TELEPHONE ENCOUNTER
"Requested Prescriptions   Pending Prescriptions Disp Refills     felodipine ER (PLENDIL) 2.5 MG 24 hr tablet [Pharmacy Med Name: FELODIPINE ER TABS 2.5MG]    Last Written Prescription Date:  4/17/2019  Last Fill Quantity: 90,  # refills: 0   Last office visit: 8/23/2018 with prescribing provider:  Cyn Crow     Future Office Visit:   Next 5 appointments (look out 90 days)    Oct 10, 2019 10:15 AM CDT  Adult Preventative Visit with Cyn Crow MD, EA EXAM ROOM 20  [facility] [address] [place] [address]  [address]  [facility] [address]  [phone]          90 tablet 0     Sig: TAKE 1 TABLET DAILY       Calcium Channel Blockers Protocol  Failed - 8/5/2019 10:59 AM        Failed - Normal serum creatinine on file in past 12 months     Recent Labs   Lab Test 05/09/19  1023   CR 2.14*             Passed - Blood pressure under 140/90 in past 12 months     BP Readings from Last 3 Encounters:   07/03/19 118/62   08/23/18 118/80   07/18/18 110/60                 Passed - Recent (12 mo) or future (30 days) visit within the authorizing provider's specialty     Patient had office visit in the last 12 months or has a visit in the next 30 days with authorizing provider or within the authorizing provider's specialty.  See \"Patient Info\" tab in inbasket, or \"Choose Columns\" in Meds & Orders section of the refill encounter.              Passed - Medication is active on med list        Passed - Patient is age 18 or older        Passed - No active pregnancy on record        Passed - No positive pregnancy test in past 12 months          "

## 2019-08-06 NOTE — TELEPHONE ENCOUNTER
Prescription approved per [facility] Ref[facility].  Patient has apt with PCP in October.   Known kidney disease.    Miranda Polo RN

## 2019-08-27 NOTE — TELEPHONE ENCOUNTER
"Requested Prescriptions   Pending Prescriptions Disp Refills     amoxicillin (AMOXIL) 500 MG capsule [Pharmacy Med Name: AMOXICILLIN CAPS 500MG]    Last Written Prescription Date:  1/31/2019  Last Fill Quantity: 4,  # refills: 1   Last office visit: 8/23/2018 with prescribing provider:  Cyn Crow     Future Office Visit:   Next 5 appointments (look out 90 days)    Oct 10, 2019 10:15 AM CDT  Adult Preventative Visit with Cyn Crow MD, EA EXAM ROOM 20  [facility] [address] [place] [address]  [address]  [employer] [address]  [phone]          4 capsule 91     Sig: TAKE 1 CAPSULE DAILY AS NEEDED       Oral Acne/Rosacea Medications Protocol Failed - 8/27/2019 11:38 AM        Failed - Confirmation of diagnosis is required     Please confirm diagnosis is acne or rosacea.     If NOT acne or rosacea; refer request to provider for further evaluation.    If diagnosis IS acne or rosacea, OK to refill BASED ON PREVIOUS REFILL CLINICAL NOTE RECOMMENDATION.          Passed - Patient is 12 years of age or older        Passed - Recent (12 mo) or future (30 days) visit within the authorizing provider's specialty     Patient had office visit in the last 12 months or has a visit in the next 30 days with authorizing provider or within the authorizing provider's specialty.  See \"Patient Info\" tab in inbasket, or \"Choose Columns\" in Meds & Orders section of the refill encounter.              Passed - Medication is active on med list        Passed - No active pregnancy on record        Passed - No positive prenancy test is past 12 months        cloNIDine (CATAPRES) 0.2 MG tablet [Pharmacy Med Name: CLONIDINE HCL TABS 0.2MG]    Last Written Prescription Date:  8/23/2018  Last Fill Quantity: 180,  # refills: 3   Last office visit: 8/23/2018 with prescribing provider:  Cyn Crow     Future Office Visit:   Next 5 appointments (look out 90 days)    Oct 10, 2019 10:15 " "AM CDT  Adult Preventative Visit with Cyn Crow MD, EA EXAM ROOM 20  [facility] [place] ([facility]) [address]  [address]  [place] [address] [phone]          180 tablet 4     Sig: TAKE 1 TABLET TWICE A DAY       Central Acting Antiadrenergic Agents Failed - 8/27/2019 11:38 AM        Failed - Normal serum creatinine on file within past 12 months     Recent Labs   Lab Test 05/09/19  1023   CR 2.14*             Passed - Blood pressure under 140/90 in past 12 months     BP Readings from Last 3 Encounters:   07/03/19 118/62   08/23/18 118/80   07/18/18 110/60                 Passed - Patient is 6 years of age or older        Passed - Recent (12 mo) or future (30 days) visit within the authorizing provider's specialty     Patient had office visit in the last 12 months or has a visit in the next 30 days with authorizing provider or within the authorizing provider's specialty.  See \"Patient Info\" tab in inbasket, or \"Choose Columns\" in Meds & Orders section of the refill encounter.              Passed - Medication is active on med list        Passed - Patient not pregnant        Passed - No positive pregnancy test on file in past 12 months          "

## 2019-08-29 NOTE — TELEPHONE ENCOUNTER
Prescription for Clonidine approved per [facility] Refill Protocol.  Patient has known kidney disease and transplant.    Has OV scheduled with PCP on 10/10/19.    Routing refill request for Amoxicillin to provider for review/approval because:  Diagnosis associated to Amoxicillin not on protocol. Provider to review.    Miranda Polo RN

## 2019-09-05 NOTE — TELEPHONE ENCOUNTER
Standing orders placed.  Please let patient know.    Cyn Crow MD  Internal Medicine - Pediatrics

## 2019-09-05 NOTE — TELEPHONE ENCOUNTER
Called patient and let her know that orders are in and ready for her appointment.     Allegra Rosales, CMA

## 2019-09-05 NOTE — TELEPHONE ENCOUNTER
Reason for Call: Request for an order or referral:    Order or referral being requested: labs for pts transplant    Date needed: as soon as possible    Has the patient been seen by the PCP for this problem? YES    Additional comments: pt is coming on 9/12 for INR and she needs her labs drawn because she is a transplant pt. She would like them for one year every 3 months.    Phone number Patient can be reached at:  Home number on file [phone] (home)    Best Time:  any    Can we leave a detailed message on this number?  YES    Call taken on 9/5/2019 at 10:08 AM by Delmi Chicas

## 2019-09-12 NOTE — PROGRESS NOTES
ANTICOAGULATION FOLLOW-UP CLINIC VISIT    Patient Name:  Elise Mason  Date:  2019  Contact Type:  Face to Face  INR today is in range, no concerns from pt. Advised to continue maintenance dosing & recheck in 5 weeks.    Sent a phone encounter to pcp to renew INR referral.     SUBJECTIVE:         OBJECTIVE    INR Protime   Date Value Ref Range Status   2019 2.5 (A) 0.86 - 1.14 Final       ASSESSMENT / PLAN  INR assessment THER    Recheck INR In: 5 WEEKS    INR Location Clinic      Anticoagulation Summary  As of 2019    INR goal:   2.0-3.0   TTR:   80.1 % (3.5 y)   INR used for dosin.5 (2019)   Warfarin maintenance plan:   5 mg (2.5 mg x 2) every Mon, Wed, Fri; 2.5 mg (2.5 mg x 1) all other days   Full warfarin instructions:   5 mg every Mon, Wed, Fri; 2.5 mg all other days   Weekly warfarin total:   25 mg   No change documented:   Blanca Holbrook RN   Plan last modified:   Fallon Velazco RN (2018)   Next INR check:   10/17/2019   Target end date:   Indefinite    Indications    Long term current use of anticoagulant therapy [Z79.01]  History of CVA (cerebrovascular accident) [Z86.73]  Cerebral artery occlusion with cerebral infarction (H) [I63.50]             Anticoagulation Episode Summary     INR check location:   Anticoagulation Clinic    Preferred lab:       Send INR reminders to:   DEONTE GONZALEZ    Comments:   2.5mg tabs - dyllan dose // CALENDAR       Anticoagulation Care Providers     Provider Role Specialty Phone number    Cyn Crow MD  Pediatrics [phone]            See the Encounter Report to view Anticoagulation Flowsheet and Dosing Calendar (Go to Encounters tab in chart review, and find the Anticoagulation Therapy Visit)    Blanca Holbrook RN

## 2019-09-12 NOTE — TELEPHONE ENCOUNTER
Reimbursement rules require all INR Clinic patients to have a yearly renewal of an INR Clinic Referral order.  Referral must be signed by the PCP for each patient.       Referral is pended. Please complete and sign.    TANA Cyr  Triage Nurse

## 2019-09-23 NOTE — TELEPHONE ENCOUNTER
Requested Prescriptions   Pending Prescriptions Disp Refills     calcitRIOL (ROCALTROL) 0.5 MCG capsule [Pharmacy Med Name: CALCITRIOL CAPS 0.5MCG]        Last Written Prescription Date:  10/3/2018  Last Fill Quantity: 90,   # refills: 3  Last Office Visit: 8/23/2018  Future Office visit:    Next 5 appointments (look out 90 days)    Oct 10, 2019 10:15 AM CDT  Adult Preventative Visit with Cyn Crow MD, EA EXAM ROOM 20  [facility] ([facility] [address] [place] [address]  [address] [place] [id number]  [phone]           Routing refill request to provider for review/approval because:  Drug not on the [facility], [facility] or  Health refill protocol or controlled substance   90 capsule 4     Sig: TAKE 1 CAPSULE DAILY       There is no refill protocol information for this order

## 2019-10-10 NOTE — PROGRESS NOTES
"SUBJECTIVE:   Elise Mason is a 74 year old female who presents for Preventive Visit.  Are you in the first 12 months of your Medicare coverage?  No    Healthy Habits:    In general, how would you rate your overall health?  Good    Frequency of exercise:  None    Duration of exercise:  Other    Do you usually eat at least 4 servings of fruit and vegetables a day, include whole grains    & fiber and avoid regularly eating high fat or \"junk\" foods?  Yes    Taking medications regularly:  Yes    Barriers to taking medications:  None    Medication side effects:  None    Ability to successfully perform activities of daily living:  No assistance needed    Home Safety:  No safety concerns identified    Hearing Impairment:  No hearing concerns    In the past 6 months, have you been bothered by leaking of urine? Yes    In general, how would you rate your overall mental or emotional health?  Good      PHQ-2 Total Score:    Additional concerns today:  No       Do you feel safe in your environment? YES  Do you have a Health Care Directive? No: Advance care planning was reviewed with patient; patient declined at this time.    Fall risk  Fallen 2 or more times in the past year?: No  Any fall with injury in the past year?: No    Cognitive Screening   1) Repeat 3 items (Leader, Season, Table)    2) Clock draw: NORMAL  3) 3 item recall: Recalls 1 object   Results: ABNORMAL clock, 1-2 items recalled: PROBABLE COGNITIVE IMPAIRMENT    Mini-CogTM Copyright MASOUD Callaway. Licensed by the author for use in [facility]; reprinted with permission (niall@.[facility]). All rights reserved.      Do you have sleep apnea, excessive snoring or daytime drowsiness?: no    Reviewed and updated as needed this visit by clinical staff  Tobacco  Allergies  Med Hx  Surg Hx  Fam Hx  Soc Hx        Reviewed and updated as needed this visit by Provider        Social History     Tobacco Use     Smoking status: Former Smoker     Last attempt to " quit: 1990     Years since quittin.7     Smokeless tobacco: Never Used   Substance Use Topics     Alcohol use: Yes     Comment: 2-3x per year       Alcohol Use 8/10/2017   Prescreen: >3 drinks/day or >7 drinks/week? The patient does not drink >3 drinks per day nor >7 drinks per week.           Current providers sharing in care for this patient include:   Patient Care Team:  Cyn Crow MD as PCP - General (Internal Medicine)  Michel Ventura MD as MD (Nephrology)  Cyn Crow MD as Assigned PCP  Kimberly Palmer MD as MD (Nephrology)    The following health maintenance items are reviewed in Epic and correct as of today:  Health Maintenance   Topic Date Due     ANNUAL REVIEW OF HM ORDERS  1945     ZOSTER IMMUNIZATION (1 of 2) 1995     COLONOSCOPY  2019     MEDICARE ANNUAL WELLNESS VISIT  2019     SARAH ASSESSMENT  2019     FALL RISK ASSESSMENT  2019     PHQ-9  2019     INFLUENZA VACCINE (1) 2019     LIPID  2019     MAMMO SCREENING  10/22/2019     DEXA  10/23/2019     BMP  2020     DTAP/TDAP/TD IMMUNIZATION (4 - Td) 2022     ADVANCE CARE PLANNING  2023     HEPATITIS C SCREENING  Completed     PNEUMOCOCCAL IMMUNIZATION 65+ HIGH/HIGHEST RISK  Completed     IPV IMMUNIZATION  Aged Out     MENINGITIS IMMUNIZATION  Aged Out       Had cardiology follow up this summer with Dr Minaya - hx of v tach (brief episodes) on holter, abnormal nuclear stress test without angina with hx of syncope, which prompted work up.  Plan for conservative approach and to continue current medications unless she develops anginal symptoms.    Hx of kidney transplant over 20 years ago, stable immunosuppression.  Gets transplant labs q 3-4 months.  Had followed with Dr Isidro longterm, now, as he is retiring, will transition to one of his partners    Polycystic kidney and liver disease - strong family hx.  Large liver cysts present, but  "not painful or changing - patient prefers to monitor at this time.    Osteoporosis - not currently on fosamax, took [id number], not currently taking.  Due for DEXA scan.    HL - on statin and tolerating well, due for lipids    LE edema - on lasix 80mg po BID, wearing compression stockings and elevating feet during day.  Still having some peripheral edema struggles.    CKD - followed by nephrology    HTN - well controlled on current medications.      Hx of skin cancer, on immunosuppression - follows with dermatology q 6 months - next visit in November.  Having some issues with her toenails - seem thicker, though not yellow.  Otherwise, no new skin concerns.    Progressing hypercalcemia on recent lab work - additional lab work up planned and pending    Hx of CVA - 1996 and 1997,  Shortly after her kidney transplant - now on chronic anticoagulation, no concerns for recurrent events or TIAs.    Dyspnea on exertion - has been evaluated by both cardiology and pulmonology in recent years.  Using albuterol once daily and thinks this is helpful.  Hx of smoking - approx 50 pack year hx, quit in 1990. which she quit in 1990. She had PFTs showing a mild ventilatory defect, air trapping, and small airways showing a positive response to bronchodilator in 2017. She does find herself getting short of breath with activity and uses a inhaler which she finds helpful.  She also uses a walker to help with ambulation.        Review of Systems   ROS: 10 point ROS neg other than the symptoms noted above in the HPI.      OBJECTIVE:   /60 (BP Location: Right arm, Patient Position: Sitting, Cuff Size: Adult Large)   Pulse 61   Temp 97.8  F (36.6  C) (Tympanic)   Resp 16   Ht 1.626 m (5' 4\")   Wt 112.9 kg (249 lb)   SpO2 95%   BMI 42.74 kg/m   Estimated body mass index is 42.74 kg/m  as calculated from the following:    Height as of this encounter: 1.626 m (5' 4\").    Weight as of this encounter: 112.9 kg (249 lb).   Wt Readings " from Last 4 Encounters:   10/10/19 112.9 kg (249 lb)   07/03/19 110.2 kg (243 lb)   08/23/18 110.2 kg (243 lb)   07/18/18 110.7 kg (244 lb 1.6 oz)       Physical Exam  GENERAL: alert and no distress  EYES: Eyes grossly normal to inspection, PERRL and conjunctivae and sclerae normal  HENT: ear canals and TM's normal, nose and mouth without ulcers or lesions  NECK: no adenopathy, no asymmetry, masses, or scars and thyroid normal to palpation  RESP: lungs clear to auscultation - no rales, rhonchi or wheezes  BREAST: politely declined exam  CV: regular rates and rhythm, normal S1 S2, no S3 or S4, grade 1/6 systolic murmur heard best over the RUSB, peripheral pulses strong and 2+ bilateral lower extremity pitting edema to mid shin    ABDOMEN: obese, no discrete masses palpable, +BS, soft, nontender  MS: no gross joint deformities  SKIN: well healed scar right cheek, no rashes  NEURO: uses walker for ambulation, able to climb up on exam table with minimal assistance, fluent speech, no focal deficits  PSYCH: mentation appears normal, affect normal/bright    Diagnostic Test Results:  Labs reviewed in [facility] - standing labs q 3 months  Future labs pending    ASSESSMENT / PLAN:       ICD-10-CM    1. Medicare annual wellness visit, subsequent Z00.00    2. Secondary renal hyperparathyroidism (H) N25.81 Repeat labs q 3 months, followed by nephrology   3. Polycystic liver disease Q44.6    4. Immunosuppressed status (H) D89.9    5. Long term current use of anticoagulant therapy Z79.01 Followed by INR clinic   6. Kidney replaced by transplant Z94.0 cloNIDine (CATAPRES) 0.2 MG tablet     diltiazem ER (TIAZAC) 240 MG 24 hr ER beaded capsule     felodipine ER (PLENDIL) 2.5 MG 24 hr tablet     furosemide (LASIX) 80 MG tablet   7. Impaired fasting glucose R73.01 **A1C FUTURE anytime   8. Pure hypercholesterolemia E78.00 Lipid panel reflex to direct LDL Fasting     simvastatin (ZOCOR) 20 MG tablet  Well controlled, continue current  "medications     9. Benign essential hypertension I10 cloNIDine (CATAPRES) 0.2 MG tablet     diltiazem ER (TIAZAC) 240 MG 24 hr ER beaded capsule     felodipine ER (PLENDIL) 2.5 MG 24 hr tablet  Well controlled, continue current medications     10. Osteoporosis, unspecified osteoporosis type, unspecified pathological fracture presence M81.0 DEXA HIP/PELVIS/SPINE - Future   11. Visit for screening mammogram Z12.31 *MA Screening Digital Bilateral   12. Hx of squamous cell carcinoma Z85.89 Continuing to follow with dermatology q 6 months   13. Asymptomatic postmenopausal status Z78.0 DEXA HIP/PELVIS/SPINE - Future   14. Special screening for malignant neoplasms, colon Z12.11 Fecal colorectal cancer screen (FIT)   15. Dyspnea on exertion R06.09 albuterol (PROAIR HFA) 108 (90 Base) MCG/ACT inhaler  Finds benefit from albuterol, no clear obstructive pattern on her previous PFTs and did have small airway response to bronchodilation.  Continue MANI use and follow       End of Life Planning:  Patient currently has an advanced directive: No.  I have verified the patient's ablity to prepare an advanced directive/make health care decisions.  Literature was provided to assist patient in preparing an advanced directive.    COUNSELING:  Reviewed preventive health counseling, as reflected in patient instructions    Estimated body mass index is 42.74 kg/m  as calculated from the following:    Height as of this encounter: 1.626 m (5' 4\").    Weight as of this encounter: 112.9 kg (249 lb).    Weight management plan: Discussed healthy diet and exercise guidelines     reports that she quit smoking about 29 years ago. She has never used smokeless tobacco.      Appropriate preventive services were discussed with this patient, including applicable screening as appropriate for cardiovascular disease, diabetes, osteopenia/osteoporosis, and glaucoma.  As appropriate for age/gender, discussed screening for colorectal cancer, prostate cancer, " breast cancer, and cervical cancer. Checklist reviewing preventive services available has been given to the patient.    Reviewed patients plan of care and provided an AVS. The Complex Care Plan (for patients with higher acuity and needing more deliberate coordination of services) for Elise meets the Care Plan requirement. This Care Plan has been established and reviewed with the Patient.    Counseling Resources:  ATP IV Guidelines  Pooled Cohorts Equation Calculator  Breast Cancer Risk Calculator  FRAX Risk Assessment  ICSI Preventive Guidelines  Dietary Guidelines for Americans, 2010  USDA's MyPlate  ASA Prophylaxis  Lung CA Screening    Cyn Crow MD  [facility]    Identified Health Risks:

## 2019-10-10 NOTE — PATIENT INSTRUCTIONS
Next labs in the middle of December - if you can, let's try for a fasting lab so we can see your cholesterol.    Think about Shingrix - check with [employer] to see if covered and if so, where should you get it (pharmacy)    Please schedule your mammogram and DEXA scan - after 10/22    Have your dermatologist look at your toenails when you go in next month     FIT test in the lab and mail back for colon cancer screening    Thank you for getting your flu shot!

## 2019-10-14 NOTE — TELEPHONE ENCOUNTER
Reason for Call:  Drug interaction    Detailed comments: cycloSPORINE modified (GENGRAF) increases simvastatin (ZOCOR) making the pt at a higher risk of Rhabdomyolysis. Please call [facility] back to advise further, Thanks!    [facility] # [phone]    Call taken on 10/14/2019 at 1:10 PM by Isadora Oconnor

## 2019-10-14 NOTE — TELEPHONE ENCOUNTER
Huddled with provider, patient has tolerated medications for many years. Okay to continue current regimen.      Called [facility] and notified them of Dr. Crow's approval.

## 2019-10-17 NOTE — PROGRESS NOTES
ANTICOAGULATION FOLLOW-UP CLINIC VISIT    Patient Name:  Elise Mason  Date:  10/17/2019  Contact Type:  Face to Face    SUBJECTIVE:  Patient Findings     Comments:   Assessed for S/S bleeding, clotting, medication, diet, health, activity and alcohol changes.          Clinical Outcomes     Negatives:   Major bleeding event, Thromboembolic event, Anticoagulation-related hospital admission, Anticoagulation-related ED visit, Anticoagulation-related fatality    Comments:   Assessed for S/S bleeding, clotting, medication, diet, health, activity and alcohol changes.             OBJECTIVE    INR Protime   Date Value Ref Range Status   10/17/2019 2.7 (A) 0.86 - 1.14 Final       ASSESSMENT / PLAN  INR assessment THER    Recheck INR In: 6 WEEKS    INR Location Clinic      Anticoagulation Summary  As of 10/17/2019    INR goal:   2.0-3.0   TTR:   80.6 % (3.6 y)   INR used for dosin.7 (10/17/2019)   Warfarin maintenance plan:   5 mg (2.5 mg x 2) every Mon, Wed, Fri; 2.5 mg (2.5 mg x 1) all other days   Full warfarin instructions:   5 mg every Mon, Wed, Fri; 2.5 mg all other days   Weekly warfarin total:   25 mg   No change documented:   Sirisha Gunderson [facility]   Plan last modified:   Fallon Velazco RN (2018)   Next INR check:   2019   Target end date:   Indefinite    Indications    Long term current use of anticoagulant therapy [Z79.01]  History of CVA (cerebrovascular accident) [Z86.73]  Cerebral artery occlusion with cerebral infarction (H) [I63.50]             Anticoagulation Episode Summary     INR check location:   Anticoagulation Clinic    Preferred lab:       Send INR reminders to:   DEONTE GONZALEZ    Comments:   2.5mg tabs - dyllan dose // CALENDAR       Anticoagulation Care Providers     Provider Role Specialty Phone number    Cyn Crow MD  Pediatrics [phone]            See the Encounter Report to view Anticoagulation Flowsheet and Dosing Calendar (Go to Encounters tab in chart  review, and find the Anticoagulation Therapy Visit)    No changes, annual exam went well, will schedule in 7 weeks due to Thanksgiving holiday.    Sirisha Gunderson RPH

## 2019-10-21 NOTE — TELEPHONE ENCOUNTER
"Requested Prescriptions   Pending Prescriptions Disp Refills     warfarin ANTICOAGULANT (COUMADIN) 2.5 MG tablet [Pharmacy Med Name: WARFARIN TABS 2.5MG]    Last Written Prescription Date:  4/4/2019  Last Fill Quantity: 130,  # refills: 1   Last office visit: 10/10/2019 with prescribing provider:  Cyn Crow     Future Office Visit:     130 tablet 4     Sig: TAKE 2 TABLETS ON MONDAY, WEDNESDAY, AND FRIDAY AND TAKE 1 TABLET ON ALL OTHER DAYS OF THE WEEK OR AS INSTRUCTED BY INR CLINIC       Vitamin K Antagonists Failed - 10/21/2019 11:39 AM        Failed - INR is within goal in the past 6 weeks     Confirm INR is within goal in the past 6 weeks.     Recent Labs   Lab Test 10/17/19   INR 2.7*                       Failed - Medication is active on med list        Passed - Recent (12 mo) or future (30 days) visit within the authorizing provider's specialty     Patient has had an office visit with the authorizing provider or a provider within the authorizing providers department within the previous 12 mos or has a future within next 30 days. See \"Patient Info\" tab in inbasket, or \"Choose Columns\" in Meds & Orders section of the refill encounter.              Passed - Patient is 18 years of age or older        Passed - Patient is not pregnant        Passed - No positive pregnancy on file in past 12 months            "

## 2019-10-29 NOTE — PROGRESS NOTES
SPIRITUAL HEALTH SERVICES Progress Note  [facility] ED [facility]    Responded to family's request for  support upon the death of pt.  Pt's  Preston and sister Baylee were present.  Preston narrated the events that led to pt's death.  He and Baylee reflected on her personality and shared good memories of her.  Offered emotional and grief support through reflective listening and validation of feelings.  Preston requested a prayer service when his daughter Delmi and her family arrived.  Once they arrived, provided prayer service, and family gathered around pt's body in prayer and blessing.  Family was appropriately grieving and emotionally supporting one another.  Preston is expecting his other daughter to arrive in a couple of hours.  Informed him how he and his family can request further  support, in case further support is needed once the other daughter arrives.    Plan: Evening on-call  remains available per family request.  Call the Nursing Supervisor or page the on-call  [phone].    Dominguez Bishop M.Div., [facility]  Staff   Pager [phone]

## 2019-10-29 NOTE — ED PROVIDER NOTES
History     Chief Complaint:  Cardiac Arrest    The history is limited by the condition of the patient.     Elise Mason is a 74 year old female with a history of HTN and Polycystic kidney who presents to the emergency department today via EMS for evaluation of cardiac arrest. The patient was found in her running car in a parking garage unresponsive roughly 20 minutes after  reports she left the house. She was given 3 rounds of epinephrine, 300 amiodarone, and 1 amp bicarb. She was initially in asystole, then in V-fib after being shocked 3 times.  Has ROSC then arrives here in [facility].  Intubated prior to arrival.  Last epinephrine 5 minutes prior to arrival in the room. Per paramedics, first responders pulled her out of the car but then she got stuck between vehicles.  Now has an abrasion left hip.  Her  arrived shortly after and was able to validate the available medical history of being on warfarin and a kidney transplant patient.  No recent illnesses or acute symptoms.    Allergies:  No known drug allergies    Medications:    Albuterol   Azathioprine   Rocaltrol  Catapres  Cyclosporine   Tiazac  Plendil  Folvite  Lasix  Glucosamine   Zocor  Coumadin  Nitrostat    Past Medical History:    Anemia  Cervical cancer   Compression fracture of lumbar vertebra   Esophageal reflux  Long-term (current) use of anticoagulants   Osteoporosis  impaired renal function  Polycystic kidney  essential hypertension   CKD  Kidney replaced by transplant  History of CVA  Squamous cell skin cancer  Cerebral artery occlusion with cerebral infarction  Polycystic liver disease  Immunosuppressed status  Morbid obesity  Syncope     Past Surgical History:    Hysterectomy  joint replacement, hip rt/lt  Transplant kidney recipient living related     Family History:    The patient's family history includes Anxiety Disorder in her brother; Arthritis in her sister; Asthma in her sister; Cancer (age of onset: 53) in her sister;  Cerebrovascular Disease (age of onset: 53) in her mother; Hypertension in her maternal grandfather; Kidney Disease in her daughter, maternal grandfather, sister, and sister; Myocardial Infarction in her maternal grandmother; Myocardial Infarction (age of onset: 53) in her mother; Respiratory in her father.    Social History:  The patient reports that she quit smoking about 29 years ago. She has never used smokeless tobacco. She reports current alcohol use. She reports that she does not use drugs.   PCP: Cyn Crow  Marital Status:  [2]      Review of Systems   Unable to perform ROS: Patient unresponsive     Physical Exam     Patient Vitals for the past 24 hrs:   BP Pulse Heart Rate Resp SpO2   10/29/19 1400 (!) 151/95 68 78 22 (!) 86 %   10/29/19 1351 139/55 65 84 18 (!) 78 %   10/29/19 1342 (!) 86/58 132 (!) 46 25 (!) 78 %   10/29/19 1339 (!) 66/29 (!) 42 (!) 48 24 (!) 58 %     Physical Exam  Eyes:               Sclera white; Pupils are fixed and not dilated  ENT:                External ears and nares normal  CV:                  RAVIN in place, regular rhythm during ROSC  Resp:               Intubated, breath sounds heard bilaterally, blood in ET tube  GI:                   Abdomen is soft  MS:                  No spontaneous movements  Skin:                Abrasion L hip  Neuro:             GCS 3T    Emergency Department Course   Laboratory:  Laboratory findings were communicated with the  who voiced understanding of the findings.    CBC:  o/w WNL. (WBC 9.6, HGB 12.2, )   ISTAT BMP: Glucose 291 (H), BUN 43 (H), GFR  19 (L),, o/w WNL (Creatinine: 2.5 (H))    Troponin (Collected 1354): 0.77 (H)  ISTAT gases lactate karley POCT: pH Venous 7.06 (L), PCO2 Venous 76 (H), PO2 Venous 47, Bicarbonate Venous 22, O2 Sat Venous 63, Lactic Acid 12.2 (H)  ABO Type ABO: A Rh positive  Lactic Acid: 12.5 (H)  PTT: 41 (H)  INR: 3.08 (H)      Interventions:  1332 1 mg epinephrine given IV  1333 1 amp  bicarb IV  1340 0.5 amiodarone IV  1341 0.5 amiodarone IV  1345 epinephrine 1 mg IV  1345 norepinephrine 0.4 IV  1346 1 amp bicarb IV  1355 1 mg epinephrine IV  1408 1 epinephrine IV    Emergency Department Course:  Past medical records, nursing notes, and vitals reviewed.  1300: I performed an exam of the patient and obtained history, as documented above.     IV was inserted and blood was drawn for laboratory testing, results above.    1332 1 mg epinephrine given IV    1333 1 amp bicarb given IV    1333 Blood glucose 176    1335 pulse check femoral pulse present    1340 0.5 amiodarone IV    1341 0.5 amiodarone IV    1343 femoral pulse present    1344 CPR restarted after negative pulse check    1345 epinephrine 1 mg given IV    1345 norepinephrine 0.4 started IV    1346 amp bicarb given IV    1355 1 mg epinephrine given IV    1358 pulse check pulse present    1407 CPR initiated after negative pulse check    1408 1 epinephrine given IV    1410 patient declared      I personally reviewed the laboratory/imaging results with the spouse and answered all related questions    Patient was declared  at 1410 and  was counseled prior to discharge.     Impression & Plan      Critical Care time was 45 minutes for this patient excluding procedures.       Medical Decision Making:  Elise Mason is a 74 year old female who presents to the emergency department today for evaluation of an out of hospital cardiac arrest with brief ROSC in the field. She was unable to proved any history as she is unresponsive.  was here later and some past medical history was determined with the help of the nurses who were speaking with him.  Initially she seemed to respond well to epinephrine and bicarb.  However she had multiple episodes of recurrent loss of pulses. This continued despite maximal cardiac support with norepinephrine and bicarb infusions. Chemistry did not show any electrolyte abnormalities that would  have contributed. Her pH is already extremely low and lactic acid elevated.  Was never stable enough for EKG or CXR.  Multiple attempts at placing Thermaguard were unsuccessful by myself and Dr. Palacio.  Given the prolonged down time pre-hospital in asystole chances of neurologically intact survival were low on arrival.  Given the multiple episodes of recurrent arrest, time of death called at 1410.   brought to bedside.   called.     Diagnosis:    ICD-10-CM    1. Ventricular fibrillation (H) I49.01 ABO/Rh type and screen     PTT     INR   2. Cardiac arrest (H) I46.9        Disposition:       Scribe Disclosure:  AMALIA Laureen Murrietaashley, am serving as a scribe at 1:36 PM on 10/29/2019 to document services personally performed by Bobbi Gregorio MD based on my observations and the provider's statements to me.    [facility] EMERGENCY DEPARTMENT       Bobbi Gregorio MD  10/29/19 8710

## 2019-10-29 NOTE — TELEPHONE ENCOUNTER
Called and spoke with medical examiner.     Will sign death certificate when available.      Cyn Crow MD  Internal Medicine - Pediatrics

## 2019-10-29 NOTE — PROGRESS NOTES
Respiratory Therapy Note        Pt received ventilation through ETT via BVM on 15Lpm with a ResQpod attached.  ETCO2 was monitored throughout the arrest.  Ventilations were stopped at time of death.    October 29, 2019 2:23 PM  Lv Serna RT

## 2019-10-29 NOTE — ED NOTES
Bed: ED33  Expected date:   Expected time:   Means of arrival:   Comments:  Kaitlynn Medic 2, 73 yo Cardiac Arrest

## 2019-10-29 NOTE — TELEPHONE ENCOUNTER
[facility] called as a courtesy call to state the pt has  as of 10/29/2019.    Candi Greer on 10/29/2019 at 3:36 PM

## 2019-10-29 NOTE — TELEPHONE ENCOUNTER
Reason for call:  Other   Patient called regarding (reason for call): call back  Additional comments: Medical Examiner calling to inform pt passed today in the [facility] and need to speak with PCP.     Dr. Crow please call any of the Investigator.    Phone number to reach patient:  Other phone number:  [phone]    Best Time:  ANY TIME, BUSINESS HOURS    Can we leave a detailed message on this number?  Not Applicable

## 2019-10-29 NOTE — ED TRIAGE NOTES
Pt arrives in cardiac arrest. Found unconscious in car, found in asystole, CPR started. Medics administered 3 doses epi, 300mg amiodarone, 1 amp bicarb, and shocked v-fib 3 times. PT arrives in PEA, in CPR. EMS intubated 7.0 21cm at the teeth.

## 2019-10-29 NOTE — ED NOTES
Pt became pulseless 3 times, CPR reinitiated, see paper charting. Pt rhythm PEA at 1407, pulse recheck at 1410 revealed same, MD called time of death 1410.

## 2019-10-30 ENCOUNTER — POST MORTEM DOCUMENTATION (OUTPATIENT)
Dept: TRANSPLANT | Facility: CLINIC | Age: 74
End: 2019-10-30

## 2019-10-30 NOTE — PROGRESS NOTES
Received notification on 10/29/2019 at 16:05 of patient's death from Laura Cornelius.  Place of death was reported as [facility].  Graft status at the time of death was reported as Unknown.    TIS verification is: Pending  The Transplant Office has been notified that patient is . The Post Mortem Encounter has been completed. Notifications have been sent to the Care team.   Instructions have been sent to cancel pending appointments, discontinue pending orders and send a sympathy card to the family.    VERÓNICA CLAY  Received notification of patient's death from Ele Suarez.  Place of death was reported as [facility].  Graft status at the time of death was reported as Functioning.  Additional information: Out of hospital crdiac arrest. Patient had multiple episode of recurrent loss of pulses in ER.   TIS verification is: Complete

## 2019-11-06 ENCOUNTER — TELEPHONE (OUTPATIENT)
Dept: PEDIATRICS | Facility: CLINIC | Age: 74
End: 2019-11-06

## 2019-11-06 DIAGNOSIS — Z86.73 HISTORY OF CVA (CEREBROVASCULAR ACCIDENT): ICD-10-CM

## 2019-11-06 DIAGNOSIS — Z79.01 LONG TERM CURRENT USE OF ANTICOAGULANT THERAPY: ICD-10-CM

## 2019-11-06 DIAGNOSIS — I63.50 CEREBRAL ARTERY OCCLUSION WITH CEREBRAL INFARCTION (H): ICD-10-CM
